# Patient Record
Sex: FEMALE | Race: WHITE | Employment: OTHER | URBAN - METROPOLITAN AREA
[De-identification: names, ages, dates, MRNs, and addresses within clinical notes are randomized per-mention and may not be internally consistent; named-entity substitution may affect disease eponyms.]

---

## 2017-01-16 DIAGNOSIS — F41.9 ANXIETY: Primary | ICD-10-CM

## 2017-01-16 RX ORDER — LORAZEPAM 0.5 MG/1
0.25-0.5 TABLET ORAL EVERY 8 HOURS PRN
Qty: 30 TABLET | Refills: 1 | Status: SHIPPED | OUTPATIENT
Start: 2017-01-16 | End: 2017-04-12

## 2017-01-16 NOTE — TELEPHONE ENCOUNTER
LORazepam (ATIVAN) 0.5 MG tablet      Last Written Prescription Date:  9/15/2016  Last Fill Quantity: 30 tablet,   # refills: 1  Last Office Visit with [facility], [facility] or [facility] prescribing provider: 10/12/2016  Future Office visit:       Routing refill request to provider for review/approval because:  Drug not on the [facility], [facility] or [facility] refill protocol or controlled substance

## 2017-01-28 ENCOUNTER — MYC MEDICAL ADVICE (OUTPATIENT)
Dept: FAMILY MEDICINE | Facility: CLINIC | Age: 62
End: 2017-01-28

## 2017-01-28 DIAGNOSIS — F41.9 ANXIETY: Primary | ICD-10-CM

## 2017-01-30 RX ORDER — VENLAFAXINE HYDROCHLORIDE 75 MG/1
75 CAPSULE, EXTENDED RELEASE ORAL DAILY
Qty: 60 CAPSULE | Refills: 0 | Status: SHIPPED | OUTPATIENT
Start: 2017-01-30 | End: 2017-01-30

## 2017-01-30 RX ORDER — VENLAFAXINE HYDROCHLORIDE 75 MG/1
75 CAPSULE, EXTENDED RELEASE ORAL DAILY
Qty: 90 CAPSULE | Refills: 0 | Status: SHIPPED | OUTPATIENT
Start: 2017-01-30 | End: 2017-04-12

## 2017-03-14 ENCOUNTER — MYC REFILL (OUTPATIENT)
Dept: FAMILY MEDICINE | Facility: CLINIC | Age: 62
End: 2017-03-14

## 2017-03-14 DIAGNOSIS — F41.9 ANXIETY: ICD-10-CM

## 2017-03-14 RX ORDER — VENLAFAXINE HYDROCHLORIDE 75 MG/1
75 CAPSULE, EXTENDED RELEASE ORAL DAILY
Qty: 90 CAPSULE | Refills: 0 | Status: CANCELLED | OUTPATIENT
Start: 2017-03-14

## 2017-03-14 NOTE — TELEPHONE ENCOUNTER
Please see alternate refill request from 3/14/17.  Luisa Sim, RN, BSN  [facility]

## 2017-03-14 NOTE — TELEPHONE ENCOUNTER
Message from [employer]:  Original authorizing provider: Karen Weiler, MD    Austin Shaffer would like a refill of the following medications:  venlafaxine (EFFEXOR-XR) 75 MG 24 hr capsule [Karen Weiler, MD]    Preferred pharmacy: [facility] [address] [place] [address] [address] [facility] [address] AT [place] [address] & [facility]    Comment:

## 2017-03-14 NOTE — TELEPHONE ENCOUNTER
Rx was refilled on 1/30/17 for 90 day supply. Message sent to patient advising to contact her pharmacy.  Luisa Sim RN, BSN  [facility]

## 2017-03-14 NOTE — TELEPHONE ENCOUNTER
Message from [employer]:  Original authorizing provider: Karen Weiler, MD    Austin Shaffer would like a refill of the following medications:  venlafaxine (EFFEXOR-XR) 75 MG 24 hr capsule [Karen Weiler, MD]    Preferred pharmacy: [facility] [address] [place] [address] [address] [facility] [address] AT [place] [address] & [facility]    Comment:

## 2017-04-12 ENCOUNTER — MYC REFILL (OUTPATIENT)
Dept: FAMILY MEDICINE | Facility: CLINIC | Age: 62
End: 2017-04-12

## 2017-04-12 DIAGNOSIS — F41.9 ANXIETY: ICD-10-CM

## 2017-04-13 RX ORDER — LORAZEPAM 0.5 MG/1
0.25-0.5 TABLET ORAL EVERY 8 HOURS PRN
Qty: 30 TABLET | Refills: 1 | Status: SHIPPED | OUTPATIENT
Start: 2017-04-13 | End: 2017-06-20

## 2017-04-13 RX ORDER — VENLAFAXINE HYDROCHLORIDE 75 MG/1
75 CAPSULE, EXTENDED RELEASE ORAL DAILY
Qty: 90 CAPSULE | Refills: 0 | Status: SHIPPED | OUTPATIENT
Start: 2017-04-13 | End: 2017-04-28

## 2017-04-13 NOTE — TELEPHONE ENCOUNTER
lorazepam      Last Written Prescription Date:  1/16/17  Last Fill Quantity: 30,   # refills: 1  Last Office Visit with [facility] or [facility] prescribing provider: 9/15/16  Future Office visit:       Routing refill request to provider for review/approval because:  Drug not on the [facility] or [facility] refill protocol or controlled substance    venlafaxine    Last Written Prescription Date: 1/30/17  Last Fill Quantity: 90, # refills: 0  Last Office Visit with [facility] or [facility] prescribing provider: 9/15/16     BP Readings from Last 3 Encounters:   09/15/16 126/72   10/08/15 118/78   06/19/15 114/82     Pulse: (for [facility])  Creatinine   Date Value Ref Range Status   09/15/2016 0.74 0.52 - 1.04 mg/dL Final   ]    Last PHQ-9 score on record=   PHQ-9 SCORE 9/15/2016   Total Score MyChart -   Total Score 8     Routing refill request to provider for review/approval because:  Labs out of range:  PHQ-9  Labs not current:  PHQ-9  Patient needs to be seen because:  Due for depression follow up  Luisa Sim, RN, BSN  [facility]

## 2017-04-13 NOTE — TELEPHONE ENCOUNTER
Message from MyChart:  Original authorizing provider: Karen Weiler, MD    Austin CAMACHOCirilo Edmund would like a refill of the following medications:  LORazepam (ATIVAN) 0.5 MG tablet [Karen Weiler, MD]  venlafaxine (EFFEXOR-XR) 75 MG 24 hr capsule [Karen Weiler, MD]    Preferred pharmacy: [facility] [address] [place] [address] [address] AT [place] [address] & [facility]    Comment:

## 2017-04-27 ENCOUNTER — TELEPHONE (OUTPATIENT)
Dept: FAMILY MEDICINE | Facility: CLINIC | Age: 62
End: 2017-04-27

## 2017-04-27 NOTE — TELEPHONE ENCOUNTER
Patient is aware of overdue screening and will call on own time for scheduling due to insurance change. Thanks

## 2017-04-28 ENCOUNTER — OFFICE VISIT (OUTPATIENT)
Dept: FAMILY MEDICINE | Facility: CLINIC | Age: 62
End: 2017-04-28
Payer: COMMERCIAL

## 2017-04-28 VITALS
SYSTOLIC BLOOD PRESSURE: 116 MMHG | DIASTOLIC BLOOD PRESSURE: 80 MMHG | HEART RATE: 70 BPM | OXYGEN SATURATION: 99 % | TEMPERATURE: 97.7 F | HEIGHT: 62 IN | BODY MASS INDEX: 30.88 KG/M2 | WEIGHT: 167.8 LBS

## 2017-04-28 DIAGNOSIS — F41.9 ANXIETY: Primary | ICD-10-CM

## 2017-04-28 DIAGNOSIS — G47.00 INSOMNIA, UNSPECIFIED TYPE: ICD-10-CM

## 2017-04-28 DIAGNOSIS — Z12.31 VISIT FOR SCREENING MAMMOGRAM: ICD-10-CM

## 2017-04-28 PROCEDURE — 99213 OFFICE O/P EST LOW 20 MIN: CPT | Performed by: PHYSICIAN ASSISTANT

## 2017-04-28 RX ORDER — VENLAFAXINE HYDROCHLORIDE 75 MG/1
75 CAPSULE, EXTENDED RELEASE ORAL DAILY
Qty: 90 CAPSULE | Refills: 3 | Status: SHIPPED | OUTPATIENT
Start: 2017-04-28 | End: 2017-09-28

## 2017-04-28 ASSESSMENT — ANXIETY QUESTIONNAIRES
GAD7 TOTAL SCORE: 6
6. BECOMING EASILY ANNOYED OR IRRITABLE: SEVERAL DAYS
3. WORRYING TOO MUCH ABOUT DIFFERENT THINGS: SEVERAL DAYS
5. BEING SO RESTLESS THAT IT IS HARD TO SIT STILL: SEVERAL DAYS
IF YOU CHECKED OFF ANY PROBLEMS ON THIS QUESTIONNAIRE, HOW DIFFICULT HAVE THESE PROBLEMS MADE IT FOR YOU TO DO YOUR WORK, TAKE CARE OF THINGS AT HOME, OR GET ALONG WITH OTHER PEOPLE: SOMEWHAT DIFFICULT
7. FEELING AFRAID AS IF SOMETHING AWFUL MIGHT HAPPEN: NOT AT ALL
1. FEELING NERVOUS, ANXIOUS, OR ON EDGE: SEVERAL DAYS
2. NOT BEING ABLE TO STOP OR CONTROL WORRYING: SEVERAL DAYS

## 2017-04-28 ASSESSMENT — PATIENT HEALTH QUESTIONNAIRE - PHQ9: 5. POOR APPETITE OR OVEREATING: SEVERAL DAYS

## 2017-04-28 NOTE — PATIENT INSTRUCTIONS
-Please call the clinic at [phone] when you are ready to schedule your mammogram. They can check the schedule for the mobile unit and assist you in scheduling your appointment

## 2017-04-28 NOTE — PROGRESS NOTES
SUBJECTIVE:                                                    Austin Shaffer is a 61 year old female who presents to clinic today for the following health issues:    Anxiety Follow-Up    Status since last visit: No change    Other associated symptoms:None    Complicating factors:   Significant life event: No   Current substance abuse: None  Depression symptoms: Yes-  occasionally   ELEAZAR-7 SCORE 1/6/2016 9/15/2016 9/15/2016   Total Score - - -   Total Score 8 (mild anxiety) - -   Total Score - 8 8        GAD7       Amount of exercise or physical activity: not since back from [place] , normally golfing     Problems taking medications regularly: No    Medication side effects: none    Diet: regular (no restrictions)    Back in [place] for about the past month.    Poor sleep. Hard time falling asleep. Stays asleep once she falls asleep. Takes Effexor in the evening.  Feels like her Effexor dose is good; doesn't want to make any changes    Lorazepam: couple of times per week, but some weeks doesn't have to take it at all    Asthma symptoms under good control. URIs are a trigger.  Some seasonal allergies at times, but they don't seem to trigger asthma symptoms.    Problem list and histories reviewed & adjusted, as indicated.  Additional history: as documented    Patient Active Problem List   Diagnosis     Moderate recurrent major depression (H)     Intermittent asthma     CARDIOVASCULAR SCREENING; LDL GOAL LESS THAN 160     Advanced directives, counseling/discussion     Anxiety     Vitamin D deficiency     Past Surgical History:   Procedure Laterality Date     ABDOMEN SURGERY      ? Enteritis as a child, possibly appendix     HYSTERECTOMY TOTAL ABDOMINAL, BILATERAL SALPINGO-OOPHORECTOMY, COMBINED  1998     TONSILLECTOMY         Social History   Substance Use Topics     Smoking status: Never Smoker     Smokeless tobacco: Never Used     Alcohol use 0.0 oz/week     0 Standard drinks or equivalent per week      Comment:  "occ     Family History   Problem Relation Age of Onset     Cardiovascular Mother      Hypertension Mother      Cardiovascular Maternal Grandmother      CANCER Father      Kidney     Hypertension Brother      DIABETES Brother          Current Outpatient Prescriptions   Medication Sig Dispense Refill     venlafaxine (EFFEXOR-XR) 75 MG 24 hr capsule Take 1 capsule (75 mg) by mouth daily 90 capsule 3     LORazepam (ATIVAN) 0.5 MG tablet Take 0.5-1 tablets (0.25-0.5 mg) by mouth every 8 hours as needed for anxiety 30 tablet 1     hydrOXYzine (ATARAX) 25 MG tablet 1 tab at bedtime 90 tablet 3     albuterol (ALBUTEROL) 108 (90 BASE) MCG/ACT inhaler Inhale 2 puffs into the lungs every 6 hours as needed for shortness of breath / dyspnea 3 Inhaler 1     [DISCONTINUED] venlafaxine (EFFEXOR-XR) 75 MG 24 hr capsule Take 1 capsule (75 mg) by mouth daily 90 capsule 0     [DISCONTINUED] venlafaxine (EFFEXOR-XR) 75 MG 24 hr capsule Take 1 capsule (75 mg) by mouth daily Please schedule appointment for further refills 90 capsule 3     betamethasone dipropionate (DIPROSONE) 0.05 % lotion Apply to scalp at bedtime when needed 60 mL 1     betamethasone dipropionate (DIPROSONE) 0.05 % cream Apply to trunk, arms and legs bid for 10-14 consecutive days, Not to be used on face or groin 45 g 1     ORDER FOR DME Equipment being ordered: walking boot. 1 Device 0     Allergies   Allergen Reactions     Sulfa Drugs      Morphine        Reviewed and updated as needed this visit by clinical staff       Reviewed and updated as needed this visit by Provider         ROS:  C: NEGATIVE for fever, chills, change in weight  PSYCHIATRIC: POSITIVE for anxiety, depressed mood and insomnia and NEGATIVE for thoughts of hurting someone else and thoughts of self harm    OBJECTIVE:                                                    /80  Pulse 70  Temp 97.7  F (36.5  C) (Oral)  Ht 5' 2\" (1.575 m)  Wt 167 lb 12.8 oz (76.1 kg)  SpO2 99%  BMI 30.69 " kg/m2  Body mass index is 30.69 kg/(m^2).  GENERAL: healthy, alert and no distress  RESP: lungs clear to auscultation - no rales, rhonchi or wheezes  CV: regular rate and rhythm, normal S1 S2, no S3 or S4, no murmur, click or rub, no peripheral edema and peripheral pulses strong  PSYCH: mentation appears normal, affect normal/bright    Diagnostic Test Results:  No results found for this or any previous visit (from the past 24 hour(s)).     ASSESSMENT/PLAN:                                                      1. Anxiety  Will continue on Effexor at current dose. Advised trying Effexor in the morning, in case it is contributing to her insomnia.  Insurance will only dispense 30 day supply, which is difficult when they go to [place] in the winter.  - venlafaxine (EFFEXOR-XR) 75 MG 24 hr capsule; Take 1 capsule (75 mg) by mouth daily  Dispense: 90 capsule; Refill: 3    2. Insomnia, unspecified type  Switch Effexor to AM to see if this helps. If no improvement, consider other options.    3. Visit for screening mammogram  Patient will schedule mammogram.  - MA SCREENING DIGITAL BILAT - Future  (s+30); Future    See Patient Instructions  Follow-up for annual exam in September.    Bettie Almanzar PA-C  [facility]

## 2017-04-28 NOTE — NURSING NOTE
"Chief Complaint   Patient presents with     Anxiety       Initial /80  Pulse 70  Temp 97.7  F (36.5  C) (Oral)  Ht 5' 2\" (1.575 m)  Wt 167 lb 12.8 oz (76.1 kg)  SpO2 99%  BMI 30.69 kg/m2 Estimated body mass index is 30.69 kg/(m^2) as calculated from the following:    Height as of this encounter: 5' 2\" (1.575 m).    Weight as of this encounter: 167 lb 12.8 oz (76.1 kg).  Medication Reconciliation: complete   Miladis Johnson Medical Assistant      "

## 2017-04-28 NOTE — MR AVS SNAPSHOT
After Visit Summary   4/28/2017    Austin Shaffer    MRN: [id number]           Patient Information     Date Of Birth          1955        Visit Information        Provider Department      4/28/2017 8:20 AM Bettie Almanzar PA-C [facility]        Today's Diagnoses     Visit for screening mammogram    -  1    Anxiety          Care Instructions    -Please call the clinic at [phone] when you are ready to schedule your mammogram. They can check the schedule for the mobile unit and assist you in scheduling your appointment        Follow-ups after your visit        Future tests that were ordered for you today     Open Future Orders        Priority Expected Expires Ordered    MA SCREENING DIGITAL BILAT - Future  (s+30) Routine  4/28/2018 4/28/2017            Who to contact     If you have questions or need follow up information about today's clinic visit or your schedule please contact [facility] [place] directly at [phone].  Normal or non-critical lab and imaging results will be communicated to you by MyChart, letter or phone within 4 business days after the clinic has received the results. If you do not hear from us within 7 days, please contact the clinic through [employer] or phone. If you have a critical or abnormal lab result, we will notify you by phone as soon as possible.  Submit refill requests through [employer] or call your pharmacy and they will forward the refill request to us. Please allow 3 business days for your refill to be completed.          Additional Information About Your Visit        MyChart Information     [employer] gives you secure access to your electronic health record. If you see a primary care provider, you can also send messages to your care team and make appointments. If you have questions, please call your primary care clinic.  If you do not have a primary care provider, please call [phone] and they will assist you.        Care EveryWhere ID   "   This is your Care EveryWhere ID. This could be used by other organizations to access your [place] medical records  [id number]        Your Vitals Were     Pulse Temperature Height Pulse Oximetry BMI (Body Mass Index)       70 97.7  F (36.5  C) (Oral) 5' 2\" (1.575 m) 99% 30.69 kg/m2        Blood Pressure from Last 3 Encounters:   04/28/17 116/80   09/15/16 126/72   10/08/15 118/78    Weight from Last 3 Encounters:   04/28/17 167 lb 12.8 oz (76.1 kg)   09/15/16 158 lb (71.7 kg)   10/08/15 170 lb (77.1 kg)                 Where to get your medicines      These medications were sent to [id number] - [place], [place] - [address] [facility] [address] AT [place] [address] & [facility]  [address] [place] [address] [address], [place] [address]    Hours:  24-hours Phone:  [phone]     venlafaxine 75 MG 24 hr capsule          Primary Care Provider Office Phone # Fax #    Karen Weiler, [phone]       [facility] [address]  [place] [address]        Thank you!     Thank you for choosing [facility]  for your care. Our goal is always to provide you with excellent care. Hearing back from our patients is one way we can continue to improve our services. Please take a few minutes to complete the written survey that you may receive in the mail after your visit with us. Thank you!             Your Updated Medication List - Protect others around you: Learn how to safely use, store and throw away your medicines at www.disposemymeds.org.          This list is accurate as of: 4/28/17  8:52 AM.  Always use your most recent med list.                   Brand Name Dispense Instructions for use    albuterol 108 (90 BASE) MCG/ACT Inhaler    albuterol    3 Inhaler    Inhale 2 puffs into the lungs every 6 hours as needed for shortness of breath / dyspnea       * betamethasone dipropionate 0.05 % cream    DIPROSONE    45 g    Apply to trunk, arms and legs bid for 10-14 consecutive days, Not to be used on " face or groin       * betamethasone dipropionate 0.05 % lotion    DIPROSONE    60 mL    Apply to scalp at bedtime when needed       hydrOXYzine 25 MG tablet    ATARAX    90 tablet    1 tab at bedtime       LORazepam 0.5 MG tablet    ATIVAN    30 tablet    Take 0.5-1 tablets (0.25-0.5 mg) by mouth every 8 hours as needed for anxiety       order for DME     1 Device    Equipment being ordered: walking boot.       venlafaxine 75 MG 24 hr capsule    EFFEXOR-XR    90 capsule    Take 1 capsule (75 mg) by mouth daily       * Notice:  This list has 2 medication(s) that are the same as other medications prescribed for you. Read the directions carefully, and ask your doctor or other care provider to review them with you.

## 2017-04-29 ASSESSMENT — PATIENT HEALTH QUESTIONNAIRE - PHQ9: SUM OF ALL RESPONSES TO PHQ QUESTIONS 1-9: 12

## 2017-04-29 ASSESSMENT — ASTHMA QUESTIONNAIRES: ACT_TOTALSCORE: 25

## 2017-04-29 ASSESSMENT — ANXIETY QUESTIONNAIRES: GAD7 TOTAL SCORE: 6

## 2017-06-20 ENCOUNTER — MYC REFILL (OUTPATIENT)
Dept: FAMILY MEDICINE | Facility: CLINIC | Age: 62
End: 2017-06-20

## 2017-06-20 DIAGNOSIS — F41.9 ANXIETY: ICD-10-CM

## 2017-06-20 RX ORDER — LORAZEPAM 0.5 MG/1
0.25-0.5 TABLET ORAL EVERY 8 HOURS PRN
Qty: 30 TABLET | Refills: 1 | Status: SHIPPED | OUTPATIENT
Start: 2017-06-20 | End: 2017-09-18

## 2017-06-20 NOTE — TELEPHONE ENCOUNTER
Message from [employer]:  Original authorizing provider: Karen Weiler, MD    Austin Shaffer would like a refill of the following medications:  LORazepam (ATIVAN) 0.5 MG tablet [Karen Weiler, MD]    Preferred pharmacy: [facility] [address] [place] [address] [address] [place] [address] & [place]    Comment:      Medication renewals requested in this message routed to other providers:  venlafaxine (EFFEXOR-XR) 75 MG 24 hr capsule [Bettie Almanzar PA-C]

## 2017-06-20 NOTE — TELEPHONE ENCOUNTER
Lorazepam      Last Written Prescription Date:  4/13/2017  Last Fill Quantity: 30,   # refills: 1  Last Office Visit with [facility], [facility] or  Health prescribing provider: 4/28/2017  Future Office visit:       Routing refill request to provider for review/approval because:  Drug not on the [facility], [facility] or [facility] refill protocol or controlled substance

## 2017-09-18 DIAGNOSIS — F41.9 ANXIETY: ICD-10-CM

## 2017-09-18 NOTE — TELEPHONE ENCOUNTER
lorazepam      Last Written Prescription Date:  6/20/2017  Last Fill Quantity: 30,   # refills: 1  Last Office Visit with [facility], [facility] or  Health prescribing provider: 4/28/2017  Future Office visit:       Routing refill request to provider for review/approval because:  Drug not on the [facility], [facility] or [employer] refill protocol or controlled substance

## 2017-09-20 RX ORDER — LORAZEPAM 0.5 MG/1
0.25-0.5 TABLET ORAL EVERY 8 HOURS PRN
Qty: 30 TABLET | Refills: 1 | Status: SHIPPED | OUTPATIENT
Start: 2017-09-20 | End: 2017-11-09

## 2017-09-28 ENCOUNTER — OFFICE VISIT (OUTPATIENT)
Dept: FAMILY MEDICINE | Facility: CLINIC | Age: 62
End: 2017-09-28
Payer: COMMERCIAL

## 2017-09-28 VITALS
WEIGHT: 168 LBS | TEMPERATURE: 98 F | OXYGEN SATURATION: 97 % | HEART RATE: 75 BPM | SYSTOLIC BLOOD PRESSURE: 120 MMHG | HEIGHT: 62 IN | BODY MASS INDEX: 30.91 KG/M2 | DIASTOLIC BLOOD PRESSURE: 78 MMHG

## 2017-09-28 DIAGNOSIS — Z00.00 ROUTINE GENERAL MEDICAL EXAMINATION AT A HEALTH CARE FACILITY: Primary | ICD-10-CM

## 2017-09-28 DIAGNOSIS — L30.9 ECZEMA, UNSPECIFIED TYPE: ICD-10-CM

## 2017-09-28 DIAGNOSIS — Z12.31 VISIT FOR SCREENING MAMMOGRAM: ICD-10-CM

## 2017-09-28 DIAGNOSIS — L28.1 PRURIGO NODULARIS: ICD-10-CM

## 2017-09-28 DIAGNOSIS — Z23 NEED FOR PROPHYLACTIC VACCINATION AND INOCULATION AGAINST INFLUENZA: ICD-10-CM

## 2017-09-28 DIAGNOSIS — F41.9 ANXIETY: ICD-10-CM

## 2017-09-28 LAB
ALBUMIN SERPL-MCNC: 3.9 G/DL (ref 3.4–5)
ALP SERPL-CCNC: 86 U/L (ref 40–150)
ALT SERPL W P-5'-P-CCNC: 26 U/L (ref 0–50)
ANION GAP SERPL CALCULATED.3IONS-SCNC: 8 MMOL/L (ref 3–14)
AST SERPL W P-5'-P-CCNC: 20 U/L (ref 0–45)
BILIRUB SERPL-MCNC: 0.9 MG/DL (ref 0.2–1.3)
BUN SERPL-MCNC: 13 MG/DL (ref 7–30)
CALCIUM SERPL-MCNC: 10 MG/DL (ref 8.5–10.1)
CHLORIDE SERPL-SCNC: 105 MMOL/L (ref 94–109)
CHOLEST SERPL-MCNC: 237 MG/DL
CO2 SERPL-SCNC: 26 MMOL/L (ref 20–32)
CREAT SERPL-MCNC: 0.76 MG/DL (ref 0.52–1.04)
ERYTHROCYTE [DISTWIDTH] IN BLOOD BY AUTOMATED COUNT: 12.6 % (ref 10–15)
GFR SERPL CREATININE-BSD FRML MDRD: 77 ML/MIN/1.7M2
GLUCOSE SERPL-MCNC: 99 MG/DL (ref 70–99)
HCT VFR BLD AUTO: 42.5 % (ref 35–47)
HDLC SERPL-MCNC: 74 MG/DL
HGB BLD-MCNC: 14.1 G/DL (ref 11.7–15.7)
LDLC SERPL CALC-MCNC: 151 MG/DL
MCH RBC QN AUTO: 30.1 PG (ref 26.5–33)
MCHC RBC AUTO-ENTMCNC: 33.2 G/DL (ref 31.5–36.5)
MCV RBC AUTO: 91 FL (ref 78–100)
NONHDLC SERPL-MCNC: 163 MG/DL
PLATELET # BLD AUTO: 279 10E9/L (ref 150–450)
POTASSIUM SERPL-SCNC: 5 MMOL/L (ref 3.4–5.3)
PROT SERPL-MCNC: 7.5 G/DL (ref 6.8–8.8)
RBC # BLD AUTO: 4.68 10E12/L (ref 3.8–5.2)
SODIUM SERPL-SCNC: 139 MMOL/L (ref 133–144)
TRIGL SERPL-MCNC: 59 MG/DL
TSH SERPL DL<=0.005 MIU/L-ACNC: 1.21 MU/L (ref 0.4–4)
WBC # BLD AUTO: 6.6 10E9/L (ref 4–11)

## 2017-09-28 PROCEDURE — 36415 COLL VENOUS BLD VENIPUNCTURE: CPT | Performed by: FAMILY MEDICINE

## 2017-09-28 PROCEDURE — 84443 ASSAY THYROID STIM HORMONE: CPT | Performed by: FAMILY MEDICINE

## 2017-09-28 PROCEDURE — 90686 IIV4 VACC NO PRSV 0.5 ML IM: CPT | Performed by: FAMILY MEDICINE

## 2017-09-28 PROCEDURE — 99396 PREV VISIT EST AGE 40-64: CPT | Mod: 25 | Performed by: FAMILY MEDICINE

## 2017-09-28 PROCEDURE — 80061 LIPID PANEL: CPT | Performed by: FAMILY MEDICINE

## 2017-09-28 PROCEDURE — 85027 COMPLETE CBC AUTOMATED: CPT | Performed by: FAMILY MEDICINE

## 2017-09-28 PROCEDURE — 90471 IMMUNIZATION ADMIN: CPT | Performed by: FAMILY MEDICINE

## 2017-09-28 PROCEDURE — 80053 COMPREHEN METABOLIC PANEL: CPT | Performed by: FAMILY MEDICINE

## 2017-09-28 RX ORDER — HYDROXYZINE HYDROCHLORIDE 25 MG/1
TABLET, FILM COATED ORAL
Qty: 90 TABLET | Refills: 3 | Status: SHIPPED | OUTPATIENT
Start: 2017-09-28 | End: 2018-10-01

## 2017-09-28 RX ORDER — VENLAFAXINE HYDROCHLORIDE 75 MG/1
75 CAPSULE, EXTENDED RELEASE ORAL DAILY
Qty: 90 CAPSULE | Refills: 3 | Status: SHIPPED | OUTPATIENT
Start: 2017-09-28 | End: 2018-10-01

## 2017-09-28 ASSESSMENT — ANXIETY QUESTIONNAIRES
GAD7 TOTAL SCORE: 10
1. FEELING NERVOUS, ANXIOUS, OR ON EDGE: MORE THAN HALF THE DAYS
6. BECOMING EASILY ANNOYED OR IRRITABLE: MORE THAN HALF THE DAYS
IF YOU CHECKED OFF ANY PROBLEMS ON THIS QUESTIONNAIRE, HOW DIFFICULT HAVE THESE PROBLEMS MADE IT FOR YOU TO DO YOUR WORK, TAKE CARE OF THINGS AT HOME, OR GET ALONG WITH OTHER PEOPLE: SOMEWHAT DIFFICULT
1. FEELING NERVOUS, ANXIOUS, OR ON EDGE: MORE THAN HALF THE DAYS
3. WORRYING TOO MUCH ABOUT DIFFERENT THINGS: MORE THAN HALF THE DAYS
5. BEING SO RESTLESS THAT IT IS HARD TO SIT STILL: SEVERAL DAYS
6. BECOMING EASILY ANNOYED OR IRRITABLE: MORE THAN HALF THE DAYS
GAD7 TOTAL SCORE: 10
5. BEING SO RESTLESS THAT IT IS HARD TO SIT STILL: SEVERAL DAYS
7. FEELING AFRAID AS IF SOMETHING AWFUL MIGHT HAPPEN: SEVERAL DAYS
IF YOU CHECKED OFF ANY PROBLEMS ON THIS QUESTIONNAIRE, HOW DIFFICULT HAVE THESE PROBLEMS MADE IT FOR YOU TO DO YOUR WORK, TAKE CARE OF THINGS AT HOME, OR GET ALONG WITH OTHER PEOPLE: SOMEWHAT DIFFICULT
7. FEELING AFRAID AS IF SOMETHING AWFUL MIGHT HAPPEN: SEVERAL DAYS
2. NOT BEING ABLE TO STOP OR CONTROL WORRYING: SEVERAL DAYS
3. WORRYING TOO MUCH ABOUT DIFFERENT THINGS: MORE THAN HALF THE DAYS
2. NOT BEING ABLE TO STOP OR CONTROL WORRYING: SEVERAL DAYS

## 2017-09-28 ASSESSMENT — PATIENT HEALTH QUESTIONNAIRE - PHQ9
5. POOR APPETITE OR OVEREATING: SEVERAL DAYS
5. POOR APPETITE OR OVEREATING: SEVERAL DAYS
SUM OF ALL RESPONSES TO PHQ QUESTIONS 1-9: 14

## 2017-09-28 NOTE — PATIENT INSTRUCTIONS
Golfer's Elbow (Medial Epicondylitis)                What is golfer's elbow?   Golfer's elbow (medial epicondylitis) is a painful inflammation of the bony bump on the inner side of the elbow.   The elbow joint is made up of the bone in the upper arm (humerus) and one of the bones in the lower arm (ulna). The bony bumps at the bottom of the humerus are called the epicondyles. The bump on the side closest to the body is called the medial epicondyle.   The tendons of the muscles that work to bend your wrist attach at the medial epicondyle. Medial epicondylitis is also referred to as wrist flexor tendinopathy or elbow tendinopathy.   How does it occur?   Golfer's elbow is caused by overuse of the muscles that bend your fingers and wrist. When these muscles are overused, the tendons are repeatedly pulled where they attach to the bone. As a result, the tendons get inflamed. This commonly happens in sports such as golf, in throwing sports, and in racquet sports. It may also happen in work activities like carpentry or typing.   If you have had tendinopathy for a long time, scar tissue may develop in the tendon. This is called tendinosis.   What are the symptoms?   Golfer's elbow causes pain on the bony bump on the inner side of the elbow. You may also have pain along the entire inner side of the forearm when the wrist is bent. You may have pain when you make a fist.   How is it diagnosed?   Your healthcare provider will examine your elbow and check for tenderness.   How is it treated?   To treat this condition:   Put an ice pack, gel pack, or package of frozen vegetables, wrapped in a cloth on the area every 3 to 4 hours, for up to 20 minutes at a time.   You could also do ice massage. To do this, first freeze water in a Styrofoam cup, then peel the top of the cup away to expose the ice. Hold the bottom of the cup and rub the ice over your elbow for 5 to 10 minutes. Do this 3 to 5 times a day for the  first 2 days.   Raise the elbow on a pillow when you are sitting or lying down.   Use an elastic bandage around the elbow, or a tennis elbow strap just below the tender spot on the elbow as directed by your provider.   Take an anti-inflammatory such as ibuprofen, or other medicine as directed by your provider. Nonsteroidal anti-inflammatory medicines (NSAIDs) may cause stomach bleeding and other problems. These risks increase with age. Read the label and take as directed. Unless recommended by your healthcare provider, do not take for more than 10 days.   While you recover from your injury, you will need to change your sport or activity to one that does not make your condition worse. For example, walk instead of playing golf, or write things out by hand instead of typing.   Follow your provider's instructions for doing exercises to help you recover.   In severe cases, you may need surgery.   How long will the effects last?   The length of recovery depends on many factors such as your age, health, and if you have had a previous injury. Recovery time also depends on the severity of the injury. A mild injury may recover within a few weeks, whereas a severe injury may take 6 weeks or longer to recover. This problem can sometimes be long-lasting and can even come back once you are better. You need to stop doing the activities that cause pain until the elbow has healed. If you keep doing activities that cause pain, your symptoms will return and it will take longer to recover.   When can I return to my normal activities?   Everyone recovers from an injury at a different rate. Return to your activities depends on how soon your elbow recovers, not by how many days or weeks it has been since your injury has occurred. In general, the longer you have symptoms before you start treatment, the longer it will take to get better. The goal is to return to your normal activities as soon as is safely possible. If you return too soon  you may worsen your injury.   You may return when you are able to forcefully  a bat or golf club, or do activities such as working at a keyboard without pain in your elbow. It is important that there is no swelling around your injured elbow and that it is as strong as the uninjured elbow. You must have full range of motion of your elbow.   How can it be prevented?   Golfer's elbow occurs because you overuse the muscles that bend your wrist. Slow down activities that cause pain. Wearing a tennis elbow strap and doing elbow stretching exercises may help prevent this problem.          Golfer's Elbow (Medial Epicondylitis) Rehabilitation Exercises              You may do the stretching exercises right away. You may do the strengthening exercises when stretching is nearly painless.   Stretching exercises   Wrist active range of motion: Flexion and extension: Bend the wrist of your injured arm forward and back as far as you can. Do 3 sets of 10.   Wrist stretch: Press the back of the hand on your injured side with your other hand to help bend your wrist. Hold for 15 to 30 seconds. Next, stretch the hand back by pressing the fingers in a backward direction. Hold for 15 to 30 seconds. Keep the arm on your injured side straight during this exercise. Do 3 sets.   Forearm pronation and supination: Bend the elbow of your injured arm 90 degrees, keeping your elbow at your side. Turn your palm up and hold for 5 seconds. Then slowly turn your palm down and hold for 5 seconds. Make sure you keep your elbow at your side and bent 90 degrees while you do the exercise. Do 3 sets of 10.   Strengthening exercises   Eccentric wrist flexion: Hold a can or hammer handle in the hand of your injured side with your palm up. Use the hand on the side that is not injured to bend your wrist up. Then let go of your wrist and use just your injured side to lower the weight slowly back to the starting position. Do 3 sets of 15. Gradually increase  the weight you are holding.   Eccentric wrist extension: Hold a soup can or hammer handle in the hand of your injured side with your palm facing down. Use the hand on the side that is not injured to bend your wrist up. Then let go of your wrist and use just your injured side to lower the weight slowly back to the starting position. Do 3 sets of 15. Gradually increase the weight you are holding.    strengthening: Squeeze a soft rubber ball and hold the squeeze for 5 seconds. Do 3 sets of 10.   Forearm pronation and supination strengthening: Hold a soup can or hammer handle in your hand and bend your elbow 90 degrees. Slowly turn your hand so your palm is up and then down. Do 3 sets of 10.   Resisted elbow flexion and extension: Hold a can of soup with your palm up. Slowly bend your elbow so that your hand is coming toward your shoulder. Then lower it slowly so your arm is completely straight. Do 3 sets of 10. Slowly increase the weight you are using.                Published by [employer].  This content is reviewed periodically and is subject to change as new health information becomes available. The information is intended to inform and educate and is not a replacement for medical evaluation, advice, diagnosis or treatment by a healthcare professional.   Written by Nora Javier, MS, PT, and Krissy Gleason PT, [facility], for [employer].   ? 2010 [employer] and/or its affiliates. All Rights Reserved.   Copyright   [employer] 2011

## 2017-09-28 NOTE — LETTER
My Depression Action Plan  Name: Austin Shaffer   Date of Birth 1955  Date: 9/28/2017    My doctor: Weiler, Karen   My clinic: [facility] [place]  [address]  [facility] [address] [phone]          GREEN    ZONE   Good Control    What it looks like:     Things are going generally well. You have normal up s and down s. You may even feel depressed from time to time, but bad moods usually last less than a day.   What you need to do:  1. Continue to care for yourself (see self care plan)  2. Check your depression survival kit and update it as needed  3. Follow your physician s recommendations including any medication.  4. Do not stop taking medication unless you consult with your physician first.           YELLOW         ZONE Getting Worse    What it looks like:     Depression is starting to interfere with your life.     It may be hard to get out of bed; you may be starting to isolate yourself from others.    Symptoms of depression are starting to last most all day and this has happened for several days.     You may have suicidal thoughts but they are not constant.   What you need to do:     1. Call your care team, your response to treatment will improve if you keep your care team informed of your progress. Yellow periods are signs an adjustment may need to be made.     2. Continue your self-care, even if you have to fake it!    3. Talk to someone in your support network    4. Open up your depression survival kit           RED    ZONE Medical Alert - Get Help    What it looks like:     Depression is seriously interfering with your life.     You may experience these or other symptoms: You can t get out of bed most days, can t work or engage in other necessary activities, you have trouble taking care of basic hygiene, or basic responsibilities, thoughts of suicide or death that will not go away, self-injurious behavior.     What you need to do:  1. Call your care team and request a  same-day appointment. If they are not available (weekends or after hours) call your local crisis line, emergency room or 911.      Electronically signed by: Miladis Johnson, September 28, 2017    Depression Self Care Plan / Survival Kit    Self-Care for Depression  Here s the deal. Your body and mind are really not as separate as most people think.  What you do and think affects how you feel and how you feel influences what you do and think. This means if you do things that people who feel good do, it will help you feel better.  Sometimes this is all it takes.  There is also a place for medication and therapy depending on how severe your depression is, so be sure to consult with your medical provider and/ or Behavioral Health Consultant if your symptoms are worsening or not improving.     In order to better manage my stress, I will:    Exercise  Get some form of exercise, every day. This will help reduce pain and release endorphins, the  feel good  chemicals in your brain. This is almost as good as taking antidepressants!  This is not the same as joining a gym and then never going! (they count on that by the way ) It can be as simple as just going for a walk or doing some gardening, anything that will get you moving.      Hygiene   Maintain good hygiene (Get out of bed in the morning, Make your bed, Brush your teeth, Take a shower, and Get dressed like you were going to work, even if you are unemployed).  If your clothes don't fit try to get ones that do.    Diet  I will strive to eat foods that are good for me, drink plenty of water, and avoid excessive sugar, caffeine, alcohol, and other mood-altering substances.  Some foods that are helpful in depression are: complex carbohydrates, B vitamins, flaxseed, fish or fish oil, fresh fruits and vegetables.    Psychotherapy  I agree to participate in Individual Therapy (if recommended).    Medication  If prescribed medications, I agree to take them.  Missing doses can result  in serious side effects.  I understand that drinking alcohol, or other illicit drug use, may cause potential side effects.  I will not stop my medication abruptly without first discussing it with my provider.    Staying Connected With Others  I will stay in touch with my friends, family members, and my primary care provider/team.    Use your imagination  Be creative.  We all have a creative side; it doesn t matter if it s oil painting, sand castles, or mud pies! This will also kick up the endorphins.    Witness Beauty  (AKA stop and smell the roses) Take a look outside, even in mid-winter. Notice colors, textures. Watch the squirrels and birds.     Service to others  Be of service to others.  There is always someone else in need.  By helping others we can  get out of ourselves  and remember the really important things.  This also provides opportunities for practicing all the other parts of the program.    Humor  Laugh and be silly!  Adjust your TV habits for less news and crime-drama and more comedy.    Control your stress  Try breathing deep, massage therapy, biofeedback, and meditation. Find time to relax each day.     My support system    Clinic Contact:  Phone number:    Contact 1:  Phone number:    Contact 2:  Phone number:    Quaker/:  Phone number:    Therapist:  Phone number:    Local crisis center:    Phone number:    Other community support:  Phone number:

## 2017-09-28 NOTE — LETTER
My Asthma Action Plan  Name: Austin Shaffer   YOB: 1955  Date: 9/28/2017   My doctor: Karen Weiler, MD   My clinic: [facility]        My Control Medicine: None  My Rescue Medicine: Albuterol (Proair/Ventolin/Proventil) inhaler 2 puffs every 6 hours as needed    My Asthma Severity:   Avoid your asthma triggers:                GREEN ZONE   Good Control    I feel good    No cough or wheeze    Can work, sleep and play without asthma symptoms       Take your asthma control medicine every day.     1. If exercise triggers your asthma, take your rescue medication    15 minutes before exercise or sports, and    During exercise if you have asthma symptoms  2. Spacer to use with inhaler: If you have a spacer, make sure to use it with your inhaler             YELLOW ZONE Getting Worse  I have ANY of these:    I do not feel good    Cough or wheeze    Chest feels tight    Wake up at night   1. Keep taking your Green Zone medications  2. Start taking your rescue medicine:    every 20 minutes for up to 1 hour. Then every 4 hours for 24-48 hours.  3. If you stay in the Yellow Zone for more than 12-24 hours, contact your doctor.  4. If you do not return to the Green Zone in 12-24 hours or you get worse, start taking your oral steroid medicine if prescribed by your provider.           RED ZONE Medical Alert - Get Help  I have ANY of these:    I feel awful    Medicine is not helping    Breathing getting harder    Trouble walking or talking    Nose opens wide to breathe       1. Take your rescue medicine NOW  2. If your provider has prescribed an oral steroid medicine, start taking it NOW  3. Call your doctor NOW  4. If you are still in the Red Zone after 20 minutes and you have not reached your doctor:    Take your rescue medicine again and    Call 911 or go to the emergency room right away    See your regular doctor within 2 weeks of an Emergency Room or Urgent Care visit for follow-up treatment.         Electronically signed by: Miladis Johnson, September 28, 2017    Annual Reminders:  Meet with Asthma Educator,  Flu Shot in the Fall, consider Pneumonia Vaccination for patients with asthma (aged 19 and older).    Pharmacy:    [id number] - [place], [place] - [address] [facility] [address] [id number] - [place], [place] - [address] AT [facility] [address] [facility] & [address]                    Asthma Triggers  How To Control Things That Make Your Asthma Worse    Triggers are things that make your asthma worse.  Look at the list below to help you find your triggers and what you can do about them.  You can help prevent asthma flare-ups by staying away from your triggers.      Trigger                                                          What you can do   Cigarette Smoke  Tobacco smoke can make asthma worse. Do not allow smoking in your home, car or around you.  Be sure no one smokes at a child s day care or school.  If you smoke, ask your health care provider for ways to help you quit.  Ask family members to quit too.  Ask your health care provider for a referral to Quit Plan to help you quit smoking, or call 0-172-070-PLAN.     Colds, Flu, Bronchitis  These are common triggers of asthma. Wash your hands often.  Don t touch your eyes, nose or mouth.  Get a flu shot every year.     Dust Mites  These are tiny bugs that live in cloth or carpet. They are too small to see. Wash sheets and blankets in hot water every week.   Encase pillows and mattress in dust mite proof covers.  Avoid having carpet if you can. If you have carpet, vacuum weekly.   Use a dust mask and HEPA vacuum.   Pollen and Outdoor Mold  Some people are allergic to trees, grass, or weed pollen, or molds. Try to keep your windows closed.  Limit time out doors when pollen count is high.   Ask you health care provider about taking medicine during allergy season.     Animal Dander  Some people are allergic to skin  flakes, urine or saliva from pets with fur or feathers. Keep pets with fur or feathers out of your home.    If you can t keep the pet outdoors, then keep the pet out of your bedroom.  Keep the bedroom door closed.  Keep pets off cloth furniture and away from stuffed toys.     Mice, Rats, and Cockroaches  Some people are allergic to the waste from these pests.   Cover food and garbage.  Clean up spills and food crumbs.  Store grease in the refrigerator.   Keep food out of the bedroom.   Indoor Mold  This can be a trigger if your home has high moisture. Fix leaking faucets, pipes, or other sources of water.   Clean moldy surfaces.  Dehumidify basement if it is damp and smelly.   Smoke, Strong Odors, and Sprays  These can reduce air quality. Stay away from strong odors and sprays, such as perfume, powder, hair spray, paints, smoke incense, paint, cleaning products, candles and new carpet.   Exercise or Sports  Some people with asthma have this trigger. Be active!  Ask your doctor about taking medicine before sports or exercise to prevent symptoms.    Warm up for 5-10 minutes before and after sports or exercise.     Other Triggers of Asthma  Cold air:  Cover your nose and mouth with a scarf.  Sometimes laughing or crying can be a trigger.  Some medicines and food can trigger asthma.

## 2017-09-28 NOTE — PROGRESS NOTES
SUBJECTIVE:   CC: Austin Shaffer is an 61 year old woman who presents for preventive health visit.     Physical   Annual:     Getting at least 3 servings of Calcium per day::  Yes    Bi-annual eye exam::  Yes    Dental care twice a year::  Yes    Sleep apnea or symptoms of sleep apnea::  None    Diet::  Low fat/cholesterol    Frequency of exercise::  2-3 days/week    Duration of exercise::  15-30 minutes    Taking medications regularly::  Yes    Medication side effects::  Muscle aches    Additional concerns today::  YES      Medication refill- Pt is leaving for [place] at the end of October, needs refills on her antidepressants, and Hydroxyzine. Her depression is manageable, but feels her anxiety has worsened, feels she is doing well with her Effexor, occasionally takes 2 tablets instead of 1 tablet. She attributes her increased anxiety to health concerns with some of her family members, her younger brother had a stroke 3 weeks ago. No SI/HI. Does not want to see a therapist at this time.     Right elbow and wrist pain- Pt has had right elbow and right wrist pain for the past 1 week, has tried wrapping and using OTC pain ointment with mild relief. She has been occasionally playing golf.    No chest pain, or SOB. Pt is fasting today. Pt due for mammogram, wants one ordered. She had hysterectomy, Pap no longer needed. She had colonoscopy 2 years ago, was normal. No family hx of ovarian cancer.      Today's PHQ-2 Score: Answers for HPI/ROS submitted by the patient on 9/25/2017   PHQ-2 Score: 2    PHQ-2 ( 1999 Pfizer) 9/28/2017   Q1: Little interest or pleasure in doing things 1   Q2: Feeling down, depressed or hopeless 1   PHQ-2 Score 2   Q1: Little interest or pleasure in doing things -   Q2: Feeling down, depressed or hopeless -   PHQ-2 Score -     Abuse: Current or Past(Physical, Sexual or Emotional)- No  Do you feel safe in your environment - Yes    Social History   Substance Use Topics     Smoking  "status: Never Smoker     Smokeless tobacco: Never Used     Alcohol use 0.0 oz/week     0 Standard drinks or equivalent per week      Comment: occ     The patient does not drink >3 drinks per day nor >7 drinks per week.    Reviewed orders with patient.  Reviewed health maintenance and updated orders accordingly - Yes    Patient over age 50, mutual decision to screen reflected in health maintenance.    Pertinent mammograms are reviewed under the imaging tab.  History of abnormal Pap smear: Status post benign hysterectomy. Health Maintenance and Surgical History updated.    Reviewed and updated as needed this visit by clinical staff  Tobacco  Allergies  Meds  Med Hx  Surg Hx  Fam Hx  Soc Hx      Reviewed and updated as needed this visit by Provider        ROS:  C: NEGATIVE for fever, chills, change in weight  I: NEGATIVE for worrisome rashes, moles or lesions  E: NEGATIVE for vision changes or irritation  ENT: NEGATIVE for ear, mouth and throat problems  R: NEGATIVE for significant cough or SOB  B: NEGATIVE for masses, tenderness or discharge  CV: NEGATIVE for chest pain, palpitations or peripheral edema  GI: NEGATIVE for nausea, abdominal pain, heartburn, or change in bowel habits  : NEGATIVE for unusual urinary or vaginal symptoms. No vaginal bleeding.  M: Positive for right elbow and right wrist pain.  N: NEGATIVE for weakness, dizziness or paresthesias  P: NEGATIVE for changes in mood or affect     This document serves as a record of the services and decisions personally performed and made by Karen Weiler, MD. It was created on her behalf by Dennis Almanzar, a trained medical scribe. The creation of this document is based on the provider's statements to the medical scribe.  Dennis Almanzar 2:34 PM September 28, 2017     OBJECTIVE:   /78  Pulse 75  Temp 98  F (36.7  C) (Oral)  Ht 1.575 m (5' 2\")  Wt 76.2 kg (168 lb)  SpO2 97%  BMI 30.73 kg/m2  EXAM:  GENERAL APPEARANCE: healthy, alert and no " distress  EYES: Eyes grossly normal to inspection, PERRL and conjunctivae and sclerae normal  HENT: ear canals and TM's normal, nose and mouth without ulcers or lesions, oropharynx clear and oral mucous membranes moist  NECK: no adenopathy, no asymmetry, masses, or scars and thyroid normal to palpation  RESP: lungs clear to auscultation - no rales, rhonchi or wheezes  BREAST: normal without masses, tenderness or nipple discharge and no palpable axillary masses or adenopathy  CV: regular rate and rhythm, normal S1 S2, no S3 or S4, no murmur, click or rub, no peripheral edema and peripheral pulses strong  ABDOMEN: soft, nontender, no hepatosplenomegaly, no masses and bowel sounds normal  MS: no musculoskeletal defects are noted and gait is age appropriate without ataxia  Rt. Elbow-+tenderness along lateral epicondyle. Good ROM of elbow. Strength 5/5  SKIN: Small scaly area on the left forehead, and on the left upper arm, treated with liquid nitrogen X2.  NEURO: Normal strength and tone, sensory exam grossly normal, mentation intact and speech normal  PSYCH: mentation appears normal and affect normal/bright    ASSESSMENT/PLAN:     (Z00.00) Routine general medical examination at a health care facility  (primary encounter diagnosis)  Comment: Pt doing well, is UTD on Tetanus, had normal colonoscopy 2 years ago. Pt is fasting this morning, will draw blood work for further evaluation.  Plan: CBC with platelets, Comprehensive metabolic         panel (BMP + Alb, Alk Phos, ALT, AST, Total.         Bili, TP), Lipid panel reflex to direct LDL,         TSH with free T4 reflex        Follow up based on labs. Next physical due 9/28/18.    (Z23) Need for prophylactic vaccination and inoculation against influenza  Comment: Pt requesting flu shot, will have her receive one today.  Plan: FLU VAC, SPLIT VIRUS IM > 3 YO (QUADRIVALENT)         [48013], Vaccine Administration, Initial         [66749]        Follow up if  "needed.    (Z12.31) Visit for screening mammogram  Comment: Pt due for mammogram, will order and have pt schedule an appointment to have one done.  Plan: MA SCREENING DIGITAL BILAT - Future  (s+30)        Follow up based on results.    (F41.9) Anxiety  Comment: Pt attributes increased anxiety to health concerns with her family members, brother recently had stroke 3 weeks ago, pt at times takes 2 Effexor tablets, will refill and have her continue with Effexor to control her anxiety.  Plan: venlafaxine (EFFEXOR-XR) 75 MG 24 hr capsule        Follow up if needed.    (L28.1) Prurigo nodularis  Comment: Stable, controlled with Hydroxyzine, will refill and have pt continue as prescribed.  Plan: hydrOXYzine (ATARAX) 25 MG tablet        Follow up if needed.    (L30.9) Eczema, unspecified type  Comment: Stable, well controlled with Hydroxyzine, will refill and have pt continue as prescribed.  Plan: hydrOXYzine (ATARAX) 25 MG tablet        Follow up if needed.    Rt. Elbow pain-may be secondary to Golfer's elbow. Exercises given. May consider referral to sports medicine if not improving.     COUNSELING:  Reviewed preventive health counseling, as reflected in patient instructions       Regular exercise       Healthy diet/nutrition       Immunizations    Vaccinated for: Influenza           Colon cancer screening     reports that she has never smoked. She has never used smokeless tobacco.    Estimated body mass index is 30.73 kg/(m^2) as calculated from the following:    Height as of this encounter: 1.575 m (5' 2\").    Weight as of this encounter: 76.2 kg (168 lb).   Weight management plan: Discussed healthy diet and exercise guidelines and patient will follow up in 12 months in clinic to re-evaluate.    Counseling Resources:  ATP IV Guidelines  Pooled Cohorts Equation Calculator  Breast Cancer Risk Calculator  FRAX Risk Assessment  ICSI Preventive Guidelines  Dietary Guidelines for Americans, 2010  USDA's MyPlate  ASA " Prophylaxis  Lung CA Screening    The information in this document, created by the medical scribe for me, accurately reflects the services I personally performed and the decisions made by me. I have reviewed and approved this document for accuracy prior to leaving the patient care area.  September 28, 2017 2:34 PM    Karen Weiler, MD  [facility]    Injectable Influenza Immunization Documentation    1.  Is the person to be vaccinated sick today?   No    2. Does the person to be vaccinated have an allergy to a component   of the vaccine?   No    3. Has the person to be vaccinated ever had a serious reaction   to influenza vaccine in the past?   No    4. Has the person to be vaccinated ever had Guillain-Barré syndrome?   No    Form completed by   Miladis Johnson Certified Medical Assistant

## 2017-09-28 NOTE — NURSING NOTE
"Chief Complaint   Patient presents with     Physical       Initial /78  Pulse 75  Temp 98  F (36.7  C) (Oral)  Ht 5' 2\" (1.575 m)  Wt 168 lb (76.2 kg)  SpO2 97%  BMI 30.73 kg/m2 Estimated body mass index is 30.73 kg/(m^2) as calculated from the following:    Height as of this encounter: 5' 2\" (1.575 m).    Weight as of this encounter: 168 lb (76.2 kg).  Medication Reconciliation: complete   Miladis Johnson Certified Medical Assistant    "

## 2017-09-28 NOTE — MR AVS SNAPSHOT
After Visit Summary   9/28/2017    Austin Shaffer    MRN: [id number]           Patient Information     Date Of Birth          1955        Visit Information        Provider Department      9/28/2017 1:40 PM Weiler, Karen, MD [facility] [place]        Today's Diagnoses     Routine general medical examination at a health care facility    -  1    Need for prophylactic vaccination and inoculation against influenza        Visit for screening mammogram        Anxiety        Prurigo nodularis        Eczema, unspecified type          Care Instructions                       Golfer's Elbow (Medial Epicondylitis)                What is golfer's elbow?   Golfer's elbow (medial epicondylitis) is a painful inflammation of the bony bump on the inner side of the elbow.   The elbow joint is made up of the bone in the upper arm (humerus) and one of the bones in the lower arm (ulna). The bony bumps at the bottom of the humerus are called the epicondyles. The bump on the side closest to the body is called the medial epicondyle.   The tendons of the muscles that work to bend your wrist attach at the medial epicondyle. Medial epicondylitis is also referred to as wrist flexor tendinopathy or elbow tendinopathy.   How does it occur?   Golfer's elbow is caused by overuse of the muscles that bend your fingers and wrist. When these muscles are overused, the tendons are repeatedly pulled where they attach to the bone. As a result, the tendons get inflamed. This commonly happens in sports such as golf, in throwing sports, and in racquet sports. It may also happen in work activities like carpentry or typing.   If you have had tendinopathy for a long time, scar tissue may develop in the tendon. This is called tendinosis.   What are the symptoms?   Golfer's elbow causes pain on the bony bump on the inner side of the elbow. You may also have pain along the entire inner side of the forearm when the wrist is bent. You may  have pain when you make a fist.   How is it diagnosed?   Your healthcare provider will examine your elbow and check for tenderness.   How is it treated?   To treat this condition:   Put an ice pack, gel pack, or package of frozen vegetables, wrapped in a cloth on the area every 3 to 4 hours, for up to 20 minutes at a time.   You could also do ice massage. To do this, first freeze water in a Styrofoam cup, then peel the top of the cup away to expose the ice. Hold the bottom of the cup and rub the ice over your elbow for 5 to 10 minutes. Do this 3 to 5 times a day for the first 2 days.   Raise the elbow on a pillow when you are sitting or lying down.   Use an elastic bandage around the elbow, or a tennis elbow strap just below the tender spot on the elbow as directed by your provider.   Take an anti-inflammatory such as ibuprofen, or other medicine as directed by your provider. Nonsteroidal anti-inflammatory medicines (NSAIDs) may cause stomach bleeding and other problems. These risks increase with age. Read the label and take as directed. Unless recommended by your healthcare provider, do not take for more than 10 days.   While you recover from your injury, you will need to change your sport or activity to one that does not make your condition worse. For example, walk instead of playing golf, or write things out by hand instead of typing.   Follow your provider's instructions for doing exercises to help you recover.   In severe cases, you may need surgery.   How long will the effects last?   The length of recovery depends on many factors such as your age, health, and if you have had a previous injury. Recovery time also depends on the severity of the injury. A mild injury may recover within a few weeks, whereas a severe injury may take 6 weeks or longer to recover. This problem can sometimes be long-lasting and can even come back once you are better. You need to stop doing the activities that cause pain until the  elbow has healed. If you keep doing activities that cause pain, your symptoms will return and it will take longer to recover.   When can I return to my normal activities?   Everyone recovers from an injury at a different rate. Return to your activities depends on how soon your elbow recovers, not by how many days or weeks it has been since your injury has occurred. In general, the longer you have symptoms before you start treatment, the longer it will take to get better. The goal is to return to your normal activities as soon as is safely possible. If you return too soon you may worsen your injury.   You may return when you are able to forcefully  a bat or golf club, or do activities such as working at a keyboard without pain in your elbow. It is important that there is no swelling around your injured elbow and that it is as strong as the uninjured elbow. You must have full range of motion of your elbow.   How can it be prevented?   Golfer's elbow occurs because you overuse the muscles that bend your wrist. Slow down activities that cause pain. Wearing a tennis elbow strap and doing elbow stretching exercises may help prevent this problem.          Golfer's Elbow (Medial Epicondylitis) Rehabilitation Exercises              You may do the stretching exercises right away. You may do the strengthening exercises when stretching is nearly painless.   Stretching exercises   Wrist active range of motion: Flexion and extension: Bend the wrist of your injured arm forward and back as far as you can. Do 3 sets of 10.   Wrist stretch: Press the back of the hand on your injured side with your other hand to help bend your wrist. Hold for 15 to 30 seconds. Next, stretch the hand back by pressing the fingers in a backward direction. Hold for 15 to 30 seconds. Keep the arm on your injured side straight during this exercise. Do 3 sets.   Forearm pronation and supination: Bend the elbow of your injured arm 90 degrees, keeping your  elbow at your side. Turn your palm up and hold for 5 seconds. Then slowly turn your palm down and hold for 5 seconds. Make sure you keep your elbow at your side and bent 90 degrees while you do the exercise. Do 3 sets of 10.   Strengthening exercises   Eccentric wrist flexion: Hold a can or hammer handle in the hand of your injured side with your palm up. Use the hand on the side that is not injured to bend your wrist up. Then let go of your wrist and use just your injured side to lower the weight slowly back to the starting position. Do 3 sets of 15. Gradually increase the weight you are holding.   Eccentric wrist extension: Hold a soup can or hammer handle in the hand of your injured side with your palm facing down. Use the hand on the side that is not injured to bend your wrist up. Then let go of your wrist and use just your injured side to lower the weight slowly back to the starting position. Do 3 sets of 15. Gradually increase the weight you are holding.    strengthening: Squeeze a soft rubber ball and hold the squeeze for 5 seconds. Do 3 sets of 10.   Forearm pronation and supination strengthening: Hold a soup can or hammer handle in your hand and bend your elbow 90 degrees. Slowly turn your hand so your palm is up and then down. Do 3 sets of 10.   Resisted elbow flexion and extension: Hold a can of soup with your palm up. Slowly bend your elbow so that your hand is coming toward your shoulder. Then lower it slowly so your arm is completely straight. Do 3 sets of 10. Slowly increase the weight you are using.                Published by [employer].  This content is reviewed periodically and is subject to change as new health information becomes available. The information is intended to inform and educate and is not a replacement for medical evaluation, advice, diagnosis or treatment by a healthcare professional.   Written by Nora Javier, MS, PT, and Krissy Gleason, PT, [facility], [facility], for [employer].   ? 2010  "[facility] and/or its affiliates. All Rights Reserved.   Copyright   [employer] 2011                Follow-ups after your visit        Future tests that were ordered for you today     Open Future Orders        Priority Expected Expires Ordered    MA SCREENING DIGITAL BILAT - Future  (s+30) Routine  9/28/2018 9/28/2017            Who to contact     If you have questions or need follow up information about today's clinic visit or your schedule please contact [facility] [place] directly at [phone].  Normal or non-critical lab and imaging results will be communicated to you by [employer], letter or phone within 4 business days after the clinic has received the results. If you do not hear from us within 7 days, please contact the clinic through [employer] or phone. If you have a critical or abnormal lab result, we will notify you by phone as soon as possible.  Submit refill requests through [employer] or call your pharmacy and they will forward the refill request to us. Please allow 3 business days for your refill to be completed.          Additional Information About Your Visit        [employer] Information     [employer] gives you secure access to your electronic health record. If you see a primary care provider, you can also send messages to your care team and make appointments. If you have questions, please call your primary care clinic.  If you do not have a primary care provider, please call [phone] and they will assist you.        Care EveryWhere ID     This is your Care EveryWhere ID. This could be used by other organizations to access your [facility] medical records  [id number]        Your Vitals Were     Pulse Temperature Height Pulse Oximetry BMI (Body Mass Index)       75 98  F (36.7  C) (Oral) 5' 2\" (1.575 m) 97% 30.73 kg/m2        Blood Pressure from Last 3 Encounters:   09/28/17 120/78   04/28/17 116/80   09/15/16 126/72    Weight from Last 3 Encounters:   09/28/17 168 lb (76.2 kg) "   04/28/17 167 lb 12.8 oz (76.1 kg)   09/15/16 158 lb (71.7 kg)              We Performed the Following     Asthma Action Plan (AAP)     CBC with platelets     Comprehensive metabolic panel (BMP + Alb, Alk Phos, ALT, AST, Total. Bili, TP)     DEPRESSION ACTION PLAN (DAP)     FLU VAC, SPLIT VIRUS IM > 3 YO (QUADRIVALENT) [04834]     Lipid panel reflex to direct LDL     TSH with free T4 reflex     Vaccine Administration, Initial [30338]          Where to get your medicines      These medications were sent to [facility] [id number] [facility] [address] AT [facility] OF [facility] [address] & [facility]  [address] [place] [address] [address], [facility] [address]    Hours:  24-hours Phone:  [phone]     hydrOXYzine 25 MG tablet    venlafaxine 75 MG 24 hr capsule          Primary Care Provider Office Phone # Fax #    Karen Weiler, [phone] [address]  [place] [address]        Equal Access to Services     RITA [facility]ON[facility] AH: Hadii aad ku hadasho Soomaali, waaxda luqadaha, qaybta kaalmada adeegyada, waxay idiin haymarcn carlos eduardo michele . So [facility] [phone].    ATENCIÓN: Si habla español, tiene a baez disposición servicios gratuitos de asistencia lingüística. Llame al [phone].    We comply with applicable federal civil rights laws and [place] laws. We do not discriminate on the basis of race, color, national origin, age, disability sex, sexual orientation or gender identity.            Thank you!     Thank you for choosing [facility]  for your care. Our goal is always to provide you with excellent care. Hearing back from our patients is one way we can continue to improve our services. Please take a few minutes to complete the written survey that you may receive in the mail after your visit with us. Thank you!             Your Updated Medication List - Protect others around you: Learn how to safely use, store and throw away your medicines at www.disposemymeds.org.          This list is  accurate as of: 9/28/17  2:30 PM.  Always use your most recent med list.                   Brand Name Dispense Instructions for use Diagnosis    albuterol 108 (90 BASE) MCG/ACT Inhaler    PROAIR HFA    3 Inhaler    Inhale 2 puffs into the lungs every 6 hours as needed for shortness of breath / dyspnea    Intermittent asthma, uncomplicated       * betamethasone dipropionate 0.05 % cream    DIPROSONE    45 g    Apply to trunk, arms and legs bid for 10-14 consecutive days, Not to be used on face or groin    Prurigo nodularis, Eczema       * betamethasone dipropionate 0.05 % lotion    DIPROSONE    60 mL    Apply to scalp at bedtime when needed    Prurigo nodularis       hydrOXYzine 25 MG tablet    ATARAX    90 tablet    1 tab at bedtime    Prurigo nodularis, Eczema, unspecified type       LORazepam 0.5 MG tablet    ATIVAN    30 tablet    Take 0.5-1 tablets (0.25-0.5 mg) by mouth every 8 hours as needed for anxiety    Anxiety       order for DME     1 Device    Equipment being ordered: walking boot.    Toe pain       venlafaxine 75 MG 24 hr capsule    EFFEXOR-XR    90 capsule    Take 1 capsule (75 mg) by mouth daily    Anxiety       * Notice:  This list has 2 medication(s) that are the same as other medications prescribed for you. Read the directions carefully, and ask your doctor or other care provider to review them with you.

## 2017-09-29 ASSESSMENT — ASTHMA QUESTIONNAIRES: ACT_TOTALSCORE: 21

## 2017-09-29 ASSESSMENT — ANXIETY QUESTIONNAIRES: GAD7 TOTAL SCORE: 10

## 2017-09-29 NOTE — PROGRESS NOTES
Dear Austin,    Dr. Weiler has reviewed your recent labs:  -Liver and gallbladder tests are normal. (ALT,AST, Alk phos, bilirubin), kidney function is normal (Cr, GFR), Sodium is normal, Potassium is normal, Calcium is normal, Glucose is normal (diabetes screening test).   -LDL(bad) cholesterol level is elevated.  A diet high in fat and simple carbohydrates, genetics and being overweight can contribute to this. ADVISE: Exercise, a low fat/low carbohydrate diet and weight loss are helpful to improve this.  Rechecking your fasting cholesterol panel in 12 months is recommended.  -TSH (thyroid stimulating hormone) level is normal which indicates normal thyroid function.  -Normal red blood cell (hgb) levels, normal white blood cell count and normal platelet levels.    If you have further questions about the interpretation of your labs, labtestsonline.org is a good website to check out for further information.    Please contact the clinic if you have additional questions.  Thank you.    Sincerely,    Bettie Almanzar PA-C  Physician extender for Dr. Karen Weiler

## 2017-10-03 ENCOUNTER — MYC MEDICAL ADVICE (OUTPATIENT)
Dept: FAMILY MEDICINE | Facility: CLINIC | Age: 62
End: 2017-10-03

## 2017-10-16 ENCOUNTER — MYC MEDICAL ADVICE (OUTPATIENT)
Dept: FAMILY MEDICINE | Facility: CLINIC | Age: 62
End: 2017-10-16

## 2017-10-16 DIAGNOSIS — F41.9 ANXIETY: Primary | ICD-10-CM

## 2017-10-16 NOTE — TELEPHONE ENCOUNTER
Please see [employer] message. Please advise. Thank you.  Luisa Sim RN, BSN  [facility]

## 2017-10-17 ENCOUNTER — MYC MEDICAL ADVICE (OUTPATIENT)
Dept: FAMILY MEDICINE | Facility: CLINIC | Age: 62
End: 2017-10-17

## 2017-10-17 NOTE — TELEPHONE ENCOUNTER
Please see [employer] message along with 10/16/17 message.  Luisa Sim, RN, BSN  [facility]

## 2017-11-09 ENCOUNTER — MYC REFILL (OUTPATIENT)
Dept: FAMILY MEDICINE | Facility: CLINIC | Age: 62
End: 2017-11-09

## 2017-11-09 DIAGNOSIS — F41.9 ANXIETY: ICD-10-CM

## 2017-11-09 RX ORDER — LORAZEPAM 0.5 MG/1
0.25-0.5 TABLET ORAL EVERY 8 HOURS PRN
Qty: 30 TABLET | Refills: 0 | Status: SHIPPED | OUTPATIENT
Start: 2017-11-09 | End: 2017-12-09

## 2017-11-09 RX ORDER — VENLAFAXINE HYDROCHLORIDE 75 MG/1
75 CAPSULE, EXTENDED RELEASE ORAL DAILY
Qty: 90 CAPSULE | Refills: 3 | Status: CANCELLED | OUTPATIENT
Start: 2017-11-09

## 2017-11-09 NOTE — TELEPHONE ENCOUNTER
Ativan      Last Written Prescription Date:  9/20/17  Last Fill Quantity: 30,   # refills: 1  Last Office visit:  9/28/17     Routing refill request to provider for review/approval because:  Drug not on the [facility], [facility] or [facility] refill protocol or controlled substance  Luisa Sim RN, BSN  [facility]

## 2017-11-09 NOTE — TELEPHONE ENCOUNTER
Message from MyChart:  Original authorizing provider: Karen Weiler, MD    Austin CAMACHOCirilo Edmund would like a refill of the following medications:  LORazepam (ATIVAN) 0.5 MG tablet [Karen Weiler, MD]  venlafaxine (EFFEXOR-XR) 75 MG 24 hr capsule [Karen Weiler, MD]    Preferred pharmacy: [facility] [address] [place] [address] [address] AT [place] [address] & [facility]    Comment:

## 2017-11-09 NOTE — TELEPHONE ENCOUNTER
The requested prescription(s) has/have been approved and has/have been printed and signed. This was forwarded to the patient care pool to fax to the patient's preferred pharmacy.     [facility] [address] - [place], [place] - [address] [facility] [address] AT [facility] OF [facility] 13 & [facility] [address] - [place], [place] - [address] [facility] AT [facility]. & [facility]    Reji Patterson Jr

## 2017-12-09 DIAGNOSIS — F41.9 ANXIETY: ICD-10-CM

## 2017-12-11 NOTE — TELEPHONE ENCOUNTER
LORazepam (ATIVAN) 0.5 MG tablet      Last Written Prescription Date:  11/9/17  Last Fill Quantity: 30,   # refills: 0  Last Office Visit: 9/28/17  Future Office visit:       Routing refill request to provider for review/approval because:  Drug not on the [facility], [facility] or [facility] refill protocol or controlled substance  Doris Luke RN- Triage [employer]

## 2017-12-13 NOTE — TELEPHONE ENCOUNTER
Call placed to Austin, no answer. Message left to return call to clinic.     Below information regarding refills needs to be communicated to Aminata. Per previous mychart encounter Dr. Patterson did mychart her this information. It looks like she was unable to schedule prior to leaving [place] and may be returning in January. Patient will need to find a PMD where she is currently at this time if she needs refills prior to her return to [place], as per below no further refills with be authorized without face to face visit/establish care as Dr. Weiler no longer .  Shelly Dillon R.N.

## 2017-12-13 NOTE — TELEPHONE ENCOUNTER
I see these are still going to a pharmacy in [place].  If that is indeed the case and she is still in [place], Ms. Shaffer needs to be seen to establish new primary care and discuss her use of benzodiazepine medications.  I'm not comfortable continuing to refill these without a face to face visit.  Please call patient.     Reji Patterson MD

## 2017-12-14 RX ORDER — LORAZEPAM 0.5 MG/1
TABLET ORAL
Qty: 30 TABLET | Refills: 0 | Status: SHIPPED | OUTPATIENT
Start: 2017-12-14 | End: 2018-05-07

## 2017-12-14 NOTE — TELEPHONE ENCOUNTER
Patient calling back. Reviewed messages below with her. She reports that she is now in [place] until the end of March. She states that she was not able to get an appointment with MD [facility] before they left. The reason they use [facility] in [place] is due to an insurance issue - she states that her insurance will not cover her to fill her prescriptions in [place] so, her daughter picks up her prescriptions here and then ships them to her in [place]. She is also unable to see a PCP in [place] as it would be out of network.    Patient can be reached at [phone] (detailed voicemail is OK).    Please advise. Thank you.  Luisa Sim RN, BSN  [facility]

## 2017-12-14 NOTE — TELEPHONE ENCOUNTER
Below information reviewed.  Will need to establish new primary care when she returns to [place].  The requested prescription(s) has/have been approved and has/have been printed and signed. This was forwarded to the patient care pool to fax to the patient's preferred pharmacy.     [facility] [address] [place], [place] - [address] AT [facility] OF [facility] 13 & [facility] [address] - [place], [place] - [address] [facility] AT [facility]. & [facility]    Reji Patterson Jr

## 2017-12-14 NOTE — TELEPHONE ENCOUNTER
Prescription of lorazepam was faxed to [facility] [place]  ECO message sent  Date:December 14, 2017  Signature:Heidy GROSS

## 2018-04-17 ENCOUNTER — MYC MEDICAL ADVICE (OUTPATIENT)
Dept: FAMILY MEDICINE | Facility: CLINIC | Age: 63
End: 2018-04-17

## 2018-04-30 ENCOUNTER — MYC MEDICAL ADVICE (OUTPATIENT)
Dept: FAMILY MEDICINE | Facility: CLINIC | Age: 63
End: 2018-04-30

## 2018-04-30 NOTE — TELEPHONE ENCOUNTER
Routing to clinic TC to assist with scheduling  Luisa Sim RN, BSN  [facility] [place]

## 2018-05-07 ENCOUNTER — OFFICE VISIT (OUTPATIENT)
Dept: FAMILY MEDICINE | Facility: CLINIC | Age: 63
End: 2018-05-07
Payer: COMMERCIAL

## 2018-05-07 VITALS
HEIGHT: 62 IN | HEART RATE: 74 BPM | DIASTOLIC BLOOD PRESSURE: 79 MMHG | TEMPERATURE: 97.5 F | OXYGEN SATURATION: 98 % | SYSTOLIC BLOOD PRESSURE: 128 MMHG | WEIGHT: 172.6 LBS | BODY MASS INDEX: 31.76 KG/M2

## 2018-05-07 DIAGNOSIS — L28.1 PRURIGO NODULARIS: ICD-10-CM

## 2018-05-07 DIAGNOSIS — F41.9 ANXIETY: ICD-10-CM

## 2018-05-07 DIAGNOSIS — M67.432 GANGLION CYST OF WRIST, LEFT: Primary | ICD-10-CM

## 2018-05-07 DIAGNOSIS — J45.20 MILD INTERMITTENT ASTHMA WITHOUT COMPLICATION: ICD-10-CM

## 2018-05-07 DIAGNOSIS — F33.1 MODERATE RECURRENT MAJOR DEPRESSION (H): ICD-10-CM

## 2018-05-07 PROCEDURE — 99214 OFFICE O/P EST MOD 30 MIN: CPT | Performed by: FAMILY MEDICINE

## 2018-05-07 RX ORDER — BETAMETHASONE DIPROPIONATE 0.5 MG/G
LOTION TOPICAL
Qty: 60 ML | Refills: 1 | Status: SHIPPED | OUTPATIENT
Start: 2018-05-07 | End: 2020-10-19

## 2018-05-07 RX ORDER — LORAZEPAM 0.5 MG/1
TABLET ORAL
Qty: 30 TABLET | Refills: 0 | Status: SHIPPED | OUTPATIENT
Start: 2018-05-07 | End: 2018-07-18

## 2018-05-07 ASSESSMENT — ANXIETY QUESTIONNAIRES
5. BEING SO RESTLESS THAT IT IS HARD TO SIT STILL: SEVERAL DAYS
7. FEELING AFRAID AS IF SOMETHING AWFUL MIGHT HAPPEN: NOT AT ALL
2. NOT BEING ABLE TO STOP OR CONTROL WORRYING: SEVERAL DAYS
1. FEELING NERVOUS, ANXIOUS, OR ON EDGE: MORE THAN HALF THE DAYS
IF YOU CHECKED OFF ANY PROBLEMS ON THIS QUESTIONNAIRE, HOW DIFFICULT HAVE THESE PROBLEMS MADE IT FOR YOU TO DO YOUR WORK, TAKE CARE OF THINGS AT HOME, OR GET ALONG WITH OTHER PEOPLE: SOMEWHAT DIFFICULT
6. BECOMING EASILY ANNOYED OR IRRITABLE: SEVERAL DAYS
GAD7 TOTAL SCORE: 8
3. WORRYING TOO MUCH ABOUT DIFFERENT THINGS: MORE THAN HALF THE DAYS

## 2018-05-07 ASSESSMENT — PATIENT HEALTH QUESTIONNAIRE - PHQ9: 5. POOR APPETITE OR OVEREATING: SEVERAL DAYS

## 2018-05-07 NOTE — PROGRESS NOTES
SUBJECTIVE:   Austin Shaffer is a 62 year old female who presents to clinic today for the following health issues:      Anxiety Follow-Up    Status since last visit: stable     Other associated symptoms: itching     Complicating factors:   Significant life event: No   Current substance abuse: None  Depression symptoms: No  ELEAZAR-7 SCORE 9/28/2017 9/28/2017 5/7/2018   Total Score - - -   Total Score - - -   Total Score 10 10 8       ELEAZAR-7    Amount of exercise or physical activity: 2-3 days/week for an average of 4 hours, golfing sometimes     Problems taking medications regularly: No    Medication side effects: none    Diet: regular (no restrictions)      Derm   Lump on left thumb X2 months. Growing in size. No drainage, heat, or redness but feels uncomfortable when pressure is applied to it.      Asthma Follow-Up    Was ACT completed today?    Yes    ACT Total Scores 5/7/2018   ACT TOTAL SCORE -   ASTHMA ER VISITS -   ASTHMA HOSPITALIZATIONS -   ACT TOTAL SCORE (Goal Greater than or Equal to 20) 22   In the past 12 months, how many times did you visit the emergency room for your asthma without being admitted to the hospital? 0   In the past 12 months, how many times were you hospitalized overnight because of your asthma? 0       Recent asthma triggers that patient is dealing with: None        Problem list and histories reviewed & adjusted, as indicated.  Additional history: as documented    Patient Active Problem List   Diagnosis     Moderate recurrent major depression (H)     Intermittent asthma     CARDIOVASCULAR SCREENING; LDL GOAL LESS THAN 160     Advanced directives, counseling/discussion     Anxiety     Vitamin D deficiency     Past Surgical History:   Procedure Laterality Date     ABDOMEN SURGERY      ? Enteritis as a child, possibly appendix     HYSTERECTOMY TOTAL ABDOMINAL, BILATERAL SALPINGO-OOPHORECTOMY, COMBINED  1998     TONSILLECTOMY         Social History   Substance Use Topics     Smoking  "status: Never Smoker     Smokeless tobacco: Never Used     Alcohol use 0.0 oz/week     0 Standard drinks or equivalent per week      Comment: 0-1     Family History   Problem Relation Age of Onset     Cardiovascular Mother      Hypertension Mother      Cardiovascular Maternal Grandmother      CANCER Father      Kidney     Myocardial Infarction Brother 60     2017     CEREBROVASCULAR DISEASE Brother 63     2017     DIABETES Brother      Myocardial Infarction Brother      Hypertension Brother            Reviewed and updated as needed this visit by clinical staff  Tobacco  Allergies  Meds  Med Hx  Surg Hx  Fam Hx  Soc Hx      Reviewed and updated as needed this visit by Provider         ROS:  Constitutional, HEENT, cardiovascular, pulmonary, gi and gu systems are negative, except as otherwise noted.    OBJECTIVE:     /79  Pulse 74  Temp 97.5  F (36.4  C) (Oral)  Ht 5' 2\" (1.575 m)  Wt 172 lb 9.6 oz (78.3 kg)  SpO2 98%  BMI 31.57 kg/m2  Body mass index is 31.57 kg/(m^2).  GENERAL: healthy, alert and no distress  NECK: no adenopathy, no asymmetry, masses, or scars and thyroid normal to palpation  RESP: lungs clear to auscultation - no rales, rhonchi or wheezes  CV: regular rate and rhythm, normal S1 S2, no S3 or S4, no murmur, click or rub, no peripheral edema and peripheral pulses strong  ABDOMEN: soft, nontender, no hepatosplenomegaly, no masses and bowel sounds normal  MS: Ganglion cyst at base of left thumb.  SKIN:Dry, scaly patch on left occipital scalp      Diagnostic Test Results:  none     ASSESSMENT/PLAN:   1. Ganglion cyst of wrist, left; patient interested in having cyst removed. Will refer to orthopedics.  - ORTHOPEDICS ADULT REFERRAL    2. Anxiety: symptoms stable. Using Ativan sparingly. Continue Effexor XR 75 mg daily. If increased usage of Ativan occurs, could increase Effexor dose.  - LORazepam (ATIVAN) 0.5 MG tablet; TAKE 1/2 TO 1 TABLET BY MOUTH EVERY 8 HOURS AS NEEDED FOR ANXIETY  " Dispense: 30 tablet; Refill: 0    3. Moderate recurrent major depression (H): symptoms stable on current regimen of Effexor XR 75 mg daily. Follow up in 6 months.    4. Prurigo nodularis: had been seen in skin clinic in the past and symptoms improved with Diprosone lotion. Will refill prescription and refer back for further evaluation and recommendations.  - betamethasone dipropionate (DIPROSONE) 0.05 % lotion; Apply to scalp at bedtime when needed  Dispense: 60 mL; Refill: 1  - SKIN CARE REFERRAL    5. Mild intermittent asthma without complication: well controlled. ACT completed today. Continue PRN albuterol.      Screening mammogram order already placed. She will schedule.    Manuel Mancilla,   [facility] [place]

## 2018-05-07 NOTE — MR AVS SNAPSHOT
After Visit Summary   5/7/2018    Austin Shaffer    MRN: [id number]           Patient Information     Date Of Birth          1955        Visit Information        Provider Department      5/7/2018 1:40 PM Manuel Mancilla DO [facility]        Today's Diagnoses     Ganglion cyst of wrist, left    -  1    Anxiety        Moderate recurrent major depression (H)        Prurigo nodularis          Care Instructions      Ganglion Cyst    A ganglion cyst usually is a painless bump on the wrist or finger joint. It connects to the joint capsule and grows like a balloon on a stalk. It is filled with joint fluid. The cause of a ganglion cyst is not known.   If the cyst puts pressure on a nearby nerve it may cause pain. Otherwise, cysts are painless and harmless. Most tend to disappear over time without treatment. Do not try to drain or break the cyst at home. This can cause harm and does not cure the problem.  If you are having pain from the cyst, a temporary wrist splint may be helpful to limit wrist motion. If this does not help, the fluid can be removed from the cyst. This should shrink the size of the cyst. If this doesn t give relief, the ganglion can be removed by surgery.  Home care    If you are having wrist pain, use a wrist splint for 1-2 weeks at a time. You can buy one at many drug stores without a prescription.    You may use over-the-counter pain medicine to control pain, unless another medicine was prescribed. If you have chronic liver or kidney disease or ever had a stomach ulcer or GI bleeding, talk with your healthcare provider before using these medicines.      If a needle was used to drain the cyst fluid or inject medicine into it, keep the site clean and covered with a bandage for the first 24 hours. If a pressure dressing was applied, leave it in place for the time advised.  Follow-up care  Follow up with your healthcare provider, or as advised by our staff. Make an  appointment for a repeat exam if pain continues for more than 2 weeks in a wrist splint.  When to seek medical advice  Call your healthcare provider right away if any of these occur:    Increasing pain in the wrist    Redness over the cyst    Fluid draining from the cyst    Numbness or tingling in the hand or arm  Date Last Reviewed: 11/20/2015 2000-2017 [employer]. [address] [place] [address]. All rights reserved. This information is not intended as a substitute for professional medical care. Always follow your healthcare professional's instructions.                Follow-ups after your visit        Additional Services     ORTHOPEDICS ADULT REFERRAL       Your provider has referred you to: PREFERRED PROVIDERS:  [facility] [place] [phone]   [email or web address]    Please be aware that coverage of these services is subject to the terms and limitations of your health insurance plan.  Call member services at your health plan with any benefit or coverage questions.      Please bring the following to your appointment:    >>   Any x-rays, CTs or MRIs which have been performed.  Contact the facility where they were done to arrange for  prior to your scheduled appointment.    >>   List of current medications   >>   This referral request   >>   Any documents/labs given to you for this referral            SKIN CARE REFERRAL       Your provider [place] you [place] [facility] [place] [facility] [place] [phone]  [email or web address]     Please be aware that coverage of these services is subject to the terms and limitations of your health insurance plan.  Please check with your insurance prior to the appointment to ensure appropriate coverage for any services considered cosmetic in nature or not medically necessary.    Please bring the following with you to your appointment:    (1) Any X-Rays,  "CTs or MRIs which have been performed.  Contact the facility where they were done to arrange for  prior to your scheduled appointment.  Any new CT, MRI or other procedures ordered by your specialist must be performed at a [facility] facility or coordinated by your clinic's referral office.  (2) List of current medications  (3) This referral request   (4) Any documents/labs given to you for this referral                  Follow-up notes from your care team     Return in about 6 months (around 11/7/2018) for depression/anxiety follow up.      Your next 10 appointments already scheduled     May 17, 2018  8:45 AM CDT   MA SCREENING DIGITAL BILATERAL with SVMA1   [facility]    [address] [facility] [address] [phone]           Do not use any powder, lotion or deodorant under your arms or on your breast. If you do, we will ask you to remove it before your exam.  Wear comfortable, two-piece clothing.  If you have any allergies, tell your care team.  Bring any previous mammograms from other facilities or have them mailed to the breast center. Three-dimensional (3D) mammograms are available at [facility] locations in [facility], [facility], [facility], [place], [place], and [place]. [facility] locations include [place] and [facility] in [place]. Benefits of 3D mammograms include: - Improved rate of cancer detection - Decreases your chance of having to go back for more tests, which means fewer: - \"False-positive\" results (This means that there is an abnormal area but it isn't cancer.) - Invasive testing procedures, such as a biopsy or surgery - Can provide clearer images of the breast if you have dense breast tissue. 3D mammography is an optional exam that anyone can have with a 2D mammogram. It doesn't replace or take the place of a 2D mammogram. 2D mammograms remain an effective screening test for all women.  Not all insurance " "companies cover the cost of a 3D mammogram. Check with your insurance.              Who to contact     If you have questions or need follow up information about today's clinic visit or your schedule please contact [facility] [place] directly at [phone].  Normal or non-critical lab and imaging results will be communicated to you by [employer], letter or phone within 4 business days after the clinic has received the results. If you do not hear from us within 7 days, please contact the clinic through "[employer]t or phone. If you have a critical or abnormal lab result, we will notify you by phone as soon as possible.  Submit refill requests through [employer] or call your pharmacy and they will forward the refill request to us. Please allow 3 business days for your refill to be completed.          Additional Information About Your Visit        [employer] Information     [employer] gives you secure access to your electronic health record. If you see a primary care provider, you can also send messages to your care team and make appointments. If you have questions, please call your primary care clinic.  If you do not have a primary care provider, please call [phone] and they will assist you.        Care EveryWhere ID     This is your Care EveryWhere ID. This could be used by other organizations to access your [facility] medical records  [id number]        Your Vitals Were     Pulse Temperature Height Pulse Oximetry BMI (Body Mass Index)       74 97.5  F (36.4  C) (Oral) 5' 2\" (1.575 m) 98% 31.57 kg/m2        Blood Pressure from Last 3 Encounters:   05/07/18 128/79   09/28/17 120/78   04/28/17 116/80    Weight from Last 3 Encounters:   05/07/18 172 lb 9.6 oz (78.3 kg)   09/28/17 168 lb (76.2 kg)   04/28/17 167 lb 12.8 oz (76.1 kg)              We Performed the Following     ORTHOPEDICS ADULT REFERRAL     SKIN CARE REFERRAL          Today's Medication Changes          These changes are accurate as of 5/7/18  2:41 PM.  If you " have any questions, ask your nurse or doctor.               These medicines have changed or have updated prescriptions.        Dose/Directions    LORazepam 0.5 MG tablet   Commonly known as:  ATIVAN   This may have changed:  See the new instructions.   Used for:  Anxiety   Changed by:  Manuel Mancilla, DO        TAKE 1/2 TO 1 TABLET BY MOUTH EVERY 8 HOURS AS NEEDED FOR ANXIETY   Quantity:  30 tablet   Refills:  0            Where to get your medicines      These medications were sent to [facility] [address] [address] AT [facility] [address] [address], [facility] [address]    Hours:  24-hours Phone:  [phone]     betamethasone dipropionate 0.05 % lotion         Some of these will need a paper prescription and others can be bought over the counter.  Ask your nurse if you have questions.     Bring a paper prescription for each of these medications     LORazepam 0.5 MG tablet                Primary Care Provider Office Phone # Fax #    Reji Patterson Jr., [phone] [address]  [place] [address]        Equal Access to Services     [facility] campusLEONOR AH: Hadii francoise ku hadasho Soomaali, waaxda luqadaha, qaybta kaalmada adeegrakesh, juliette saravia. So [facility] [phone].    ATENCIÓN: Si habla español, tiene a baez disposición servicios gratFitness Partnersos de asistencia lingüística. Ethel al [phone].    We comply with applicable federal civil rights laws and [place] laws. We do not discriminate on the basis of race, color, national origin, age, disability, sex, sexual orientation, or gender identity.            Thank you!     Thank you for choosing [facility]  for your care. Our goal is always to provide you with excellent care. Hearing back from our patients is one way we can continue to improve our services. Please take a few minutes to complete the written survey that you may receive in the mail after your  visit with us. Thank you!             Your Updated Medication List - Protect others around you: Learn how to safely use, store and throw away your medicines at www.disposemymeds.org.          This list is accurate as of 5/7/18  2:41 PM.  Always use your most recent med list.                   Brand Name Dispense Instructions for use Diagnosis    albuterol 108 (90 Base) MCG/ACT Inhaler    PROAIR HFA    3 Inhaler    Inhale 2 puffs into the lungs every 6 hours as needed for shortness of breath / dyspnea    Intermittent asthma, uncomplicated       * betamethasone dipropionate 0.05 % cream    DIPROSONE    45 g    Apply to trunk, arms and legs bid for 10-14 consecutive days, Not to be used on face or groin    Prurigo nodularis, Eczema       * betamethasone dipropionate 0.05 % lotion    DIPROSONE    60 mL    Apply to scalp at bedtime when needed    Prurigo nodularis       hydrOXYzine 25 MG tablet    ATARAX    90 tablet    1 tab at bedtime    Prurigo nodularis, Eczema, unspecified type       LORazepam 0.5 MG tablet    ATIVAN    30 tablet    TAKE 1/2 TO 1 TABLET BY MOUTH EVERY 8 HOURS AS NEEDED FOR ANXIETY    Anxiety       order for DME     1 Device    Equipment being ordered: walking boot.    Toe pain       venlafaxine 75 MG 24 hr capsule    EFFEXOR-XR    90 capsule    Take 1 capsule (75 mg) by mouth daily    Anxiety       * Notice:  This list has 2 medication(s) that are the same as other medications prescribed for you. Read the directions carefully, and ask your doctor or other care provider to review them with you.

## 2018-05-07 NOTE — PATIENT INSTRUCTIONS
Ganglion Cyst    A ganglion cyst usually is a painless bump on the wrist or finger joint. It connects to the joint capsule and grows like a balloon on a stalk. It is filled with joint fluid. The cause of a ganglion cyst is not known.   If the cyst puts pressure on a nearby nerve it may cause pain. Otherwise, cysts are painless and harmless. Most tend to disappear over time without treatment. Do not try to drain or break the cyst at home. This can cause harm and does not cure the problem.  If you are having pain from the cyst, a temporary wrist splint may be helpful to limit wrist motion. If this does not help, the fluid can be removed from the cyst. This should shrink the size of the cyst. If this doesn t give relief, the ganglion can be removed by surgery.  Home care    If you are having wrist pain, use a wrist splint for 1-2 weeks at a time. You can buy one at many drug stores without a prescription.    You may use over-the-counter pain medicine to control pain, unless another medicine was prescribed. If you have chronic liver or kidney disease or ever had a stomach ulcer or GI bleeding, talk with your healthcare provider before using these medicines.      If a needle was used to drain the cyst fluid or inject medicine into it, keep the site clean and covered with a bandage for the first 24 hours. If a pressure dressing was applied, leave it in place for the time advised.  Follow-up care  Follow up with your healthcare provider, or as advised by our staff. Make an appointment for a repeat exam if pain continues for more than 2 weeks in a wrist splint.  When to seek medical advice  Call your healthcare provider right away if any of these occur:    Increasing pain in the wrist    Redness over the cyst    Fluid draining from the cyst    Numbness or tingling in the hand or arm  Date Last Reviewed: 11/20/2015 2000-2017 [employer]. [address] [place] [address], [place] [address]. All rights reserved. This  information is not intended as a substitute for professional medical care. Always follow your healthcare professional's instructions.

## 2018-05-08 ENCOUNTER — TELEPHONE (OUTPATIENT)
Dept: FAMILY MEDICINE | Facility: CLINIC | Age: 63
End: 2018-05-08

## 2018-05-08 ASSESSMENT — ANXIETY QUESTIONNAIRES: GAD7 TOTAL SCORE: 8

## 2018-05-08 ASSESSMENT — PATIENT HEALTH QUESTIONNAIRE - PHQ9: SUM OF ALL RESPONSES TO PHQ QUESTIONS 1-9: 9

## 2018-05-08 ASSESSMENT — ASTHMA QUESTIONNAIRES: ACT_TOTALSCORE: 22

## 2018-05-08 NOTE — TELEPHONE ENCOUNTER
Patient referred to [facility] by Dr. Mancilla, Manuel MARTINEZ DO. Called patient, left message to call back if pt would like to schedule appointment with [facility].       Mini Gonzalez MA on 5/8/2018 at 9:35 AM

## 2018-05-10 ENCOUNTER — TRANSFERRED RECORDS (OUTPATIENT)
Dept: HEALTH INFORMATION MANAGEMENT | Facility: CLINIC | Age: 63
End: 2018-05-10

## 2018-05-15 ENCOUNTER — MYC MEDICAL ADVICE (OUTPATIENT)
Dept: FAMILY MEDICINE | Facility: CLINIC | Age: 63
End: 2018-05-15

## 2018-05-16 ENCOUNTER — TELEPHONE (OUTPATIENT)
Dept: FAMILY MEDICINE | Facility: CLINIC | Age: 63
End: 2018-05-16

## 2018-05-16 NOTE — TELEPHONE ENCOUNTER
Epic medication reviewed and agree there would be available refills. Since Luisa MOTA (below) verified this with [facility] and patient on line no further action needed at this time. Shelly Dillon R.N.

## 2018-05-16 NOTE — TELEPHONE ENCOUNTER
Pt called stating Fawad told her that there were not any refills left for Effexor. Most recent [facility] encounter notes to patient she should have a refill available at [facility] pharmacy. I called Fawad with pt on the line and confirmed with pt and pharmacy she does still have refills remaining for this prescription.    Luisa Funk, [facility]  Diabetes and Nutrition Scheduling

## 2018-05-17 ENCOUNTER — RADIANT APPOINTMENT (OUTPATIENT)
Dept: MAMMOGRAPHY | Facility: CLINIC | Age: 63
End: 2018-05-17
Payer: COMMERCIAL

## 2018-05-17 PROCEDURE — 77067 SCR MAMMO BI INCL CAD: CPT | Mod: TC

## 2018-07-18 DIAGNOSIS — F41.9 ANXIETY: ICD-10-CM

## 2018-07-18 NOTE — TELEPHONE ENCOUNTER
LORazepam (ATIVAN) 0.5 MG tablet      Last Written Prescription Date:  5/7/2018  Last Fill Quantity: 30 tablet,   # refills: 0  Last Office Visit: 5/7/2018  Charo  Future Office visit:       Routing refill request to provider for review/approval because:  Drug not on the [facility], [facility] or [facility] refill protocol or controlled substance

## 2018-07-18 NOTE — TELEPHONE ENCOUNTER
RX monitoring [place] ([place]) reviewed:  reviewed- recommend provider review 7/18/2018, please print and place on providers desk.    [place] profile:  [email or web address]    Routing refill request to provider for review/approval because:  Drug not on the [facility] refill protocol     Antoinette Louise RN  [employer] [facility]

## 2018-07-19 RX ORDER — LORAZEPAM 0.5 MG/1
TABLET ORAL
Qty: 30 TABLET | Refills: 0 | Status: SHIPPED | OUTPATIENT
Start: 2018-07-19 | End: 2018-09-07

## 2018-07-19 NOTE — TELEPHONE ENCOUNTER
Chart reviewed. Refill for Ativan signed and placed in TC box.    Manuel Mancilla DO  7/19/2018 11:00 AM

## 2018-07-19 NOTE — TELEPHONE ENCOUNTER
Report printed and placed on SW, DO's for review in SR, MD's absence. Are you able to review and advise?  Luisa Sim, RN, BSN  [facility]

## 2018-09-07 DIAGNOSIS — F41.9 ANXIETY: ICD-10-CM

## 2018-09-07 NOTE — TELEPHONE ENCOUNTER
LORazepam (ATIVAN) 0.5 MG tablet      Last Written Prescription Date:  7/19/2018  Last Fill Quantity: 30 tablet,   # refills: 0  Last Office Visit: 5/7/2018  Future Office visit:       Routing refill request to provider for review/approval because:  Drug not on the [facility], P or [facility] refill protocol or controlled substance

## 2018-09-10 RX ORDER — LORAZEPAM 0.5 MG/1
TABLET ORAL
Qty: 30 TABLET | Refills: 0 | Status: SHIPPED | OUTPATIENT
Start: 2018-09-10 | End: 2018-10-01

## 2018-09-10 NOTE — TELEPHONE ENCOUNTER
The requested prescription(s) has/have been approved and has/have been printed and signed. This was forwarded to the patient care pool to fax to the patient's preferred pharmacy.     [facility] - [place], [place] - [address] AT [facility] [address] & [facility] - [place], [place] - [address] [facility] AT [facility] [facility]    Reji Patterson Jr

## 2018-10-01 ENCOUNTER — OFFICE VISIT (OUTPATIENT)
Dept: FAMILY MEDICINE | Facility: CLINIC | Age: 63
End: 2018-10-01
Payer: COMMERCIAL

## 2018-10-01 VITALS
WEIGHT: 176.9 LBS | SYSTOLIC BLOOD PRESSURE: 108 MMHG | TEMPERATURE: 98.3 F | BODY MASS INDEX: 32.55 KG/M2 | HEIGHT: 62 IN | DIASTOLIC BLOOD PRESSURE: 79 MMHG | HEART RATE: 80 BPM | OXYGEN SATURATION: 99 %

## 2018-10-01 DIAGNOSIS — F33.1 MODERATE RECURRENT MAJOR DEPRESSION (H): ICD-10-CM

## 2018-10-01 DIAGNOSIS — Z13.6 CARDIOVASCULAR SCREENING; LDL GOAL LESS THAN 160: ICD-10-CM

## 2018-10-01 DIAGNOSIS — F41.9 ANXIETY: ICD-10-CM

## 2018-10-01 DIAGNOSIS — Z11.4 SCREENING FOR HIV (HUMAN IMMUNODEFICIENCY VIRUS): ICD-10-CM

## 2018-10-01 DIAGNOSIS — L28.1 PRURIGO NODULARIS: ICD-10-CM

## 2018-10-01 DIAGNOSIS — J45.20 INTERMITTENT ASTHMA, UNCOMPLICATED: ICD-10-CM

## 2018-10-01 DIAGNOSIS — Z00.00 ROUTINE GENERAL MEDICAL EXAMINATION AT A HEALTH CARE FACILITY: Primary | ICD-10-CM

## 2018-10-01 DIAGNOSIS — Z23 NEED FOR PROPHYLACTIC VACCINATION AND INOCULATION AGAINST INFLUENZA: ICD-10-CM

## 2018-10-01 DIAGNOSIS — Z12.11 SCREEN FOR COLON CANCER: ICD-10-CM

## 2018-10-01 DIAGNOSIS — L30.9 ECZEMA, UNSPECIFIED TYPE: ICD-10-CM

## 2018-10-01 DIAGNOSIS — Z13.1 SCREENING FOR DIABETES MELLITUS: ICD-10-CM

## 2018-10-01 LAB
ANION GAP SERPL CALCULATED.3IONS-SCNC: 7 MMOL/L (ref 3–14)
BUN SERPL-MCNC: 11 MG/DL (ref 7–30)
CALCIUM SERPL-MCNC: 9.1 MG/DL (ref 8.5–10.1)
CHLORIDE SERPL-SCNC: 109 MMOL/L (ref 94–109)
CHOLEST SERPL-MCNC: 247 MG/DL
CO2 SERPL-SCNC: 29 MMOL/L (ref 20–32)
CREAT SERPL-MCNC: 0.78 MG/DL (ref 0.52–1.04)
GFR SERPL CREATININE-BSD FRML MDRD: 74 ML/MIN/1.7M2
GLUCOSE SERPL-MCNC: 93 MG/DL (ref 70–99)
HDLC SERPL-MCNC: 59 MG/DL
LDLC SERPL CALC-MCNC: 167 MG/DL
NONHDLC SERPL-MCNC: 188 MG/DL
POTASSIUM SERPL-SCNC: 4.5 MMOL/L (ref 3.4–5.3)
SODIUM SERPL-SCNC: 145 MMOL/L (ref 133–144)
TRIGL SERPL-MCNC: 103 MG/DL

## 2018-10-01 PROCEDURE — 36415 COLL VENOUS BLD VENIPUNCTURE: CPT | Performed by: FAMILY MEDICINE

## 2018-10-01 PROCEDURE — 80061 LIPID PANEL: CPT | Performed by: FAMILY MEDICINE

## 2018-10-01 PROCEDURE — 99396 PREV VISIT EST AGE 40-64: CPT | Performed by: FAMILY MEDICINE

## 2018-10-01 PROCEDURE — 87389 HIV-1 AG W/HIV-1&-2 AB AG IA: CPT | Performed by: FAMILY MEDICINE

## 2018-10-01 PROCEDURE — 80048 BASIC METABOLIC PNL TOTAL CA: CPT | Performed by: FAMILY MEDICINE

## 2018-10-01 RX ORDER — VENLAFAXINE HYDROCHLORIDE 75 MG/1
75 CAPSULE, EXTENDED RELEASE ORAL DAILY
Qty: 90 CAPSULE | Refills: 3 | Status: SHIPPED | OUTPATIENT
Start: 2018-10-01 | End: 2019-10-03

## 2018-10-01 RX ORDER — HYDROXYZINE HYDROCHLORIDE 25 MG/1
TABLET, FILM COATED ORAL
Qty: 90 TABLET | Refills: 3 | Status: SHIPPED | OUTPATIENT
Start: 2018-10-01 | End: 2021-03-26

## 2018-10-01 RX ORDER — LORAZEPAM 0.5 MG/1
TABLET ORAL
Qty: 30 TABLET | Refills: 0 | Status: SHIPPED | OUTPATIENT
Start: 2018-10-01 | End: 2019-05-13

## 2018-10-01 RX ORDER — ALBUTEROL SULFATE 90 UG/1
2 AEROSOL, METERED RESPIRATORY (INHALATION) EVERY 6 HOURS PRN
Qty: 3 INHALER | Refills: 1 | Status: SHIPPED | OUTPATIENT
Start: 2018-10-01 | End: 2020-10-19

## 2018-10-01 ASSESSMENT — ANXIETY QUESTIONNAIRES
3. WORRYING TOO MUCH ABOUT DIFFERENT THINGS: SEVERAL DAYS
2. NOT BEING ABLE TO STOP OR CONTROL WORRYING: SEVERAL DAYS
6. BECOMING EASILY ANNOYED OR IRRITABLE: SEVERAL DAYS
5. BEING SO RESTLESS THAT IT IS HARD TO SIT STILL: NOT AT ALL
1. FEELING NERVOUS, ANXIOUS, OR ON EDGE: SEVERAL DAYS
IF YOU CHECKED OFF ANY PROBLEMS ON THIS QUESTIONNAIRE, HOW DIFFICULT HAVE THESE PROBLEMS MADE IT FOR YOU TO DO YOUR WORK, TAKE CARE OF THINGS AT HOME, OR GET ALONG WITH OTHER PEOPLE: SOMEWHAT DIFFICULT
7. FEELING AFRAID AS IF SOMETHING AWFUL MIGHT HAPPEN: NOT AT ALL
GAD7 TOTAL SCORE: 5

## 2018-10-01 ASSESSMENT — PATIENT HEALTH QUESTIONNAIRE - PHQ9: 5. POOR APPETITE OR OVEREATING: SEVERAL DAYS

## 2018-10-01 NOTE — TELEPHONE ENCOUNTER
"Requested Prescriptions   Pending Prescriptions Disp Refills     venlafaxine (EFFEXOR-XR) 75 MG 24 hr capsule [Pharmacy Med Name: VENLAFAXINE ER 75MG CAPSULES]  This may be a duplicate refill request.  Last Written Prescription Date:  10/1/2018  Last Fill Quantity: 90 capsule,  # refills: 3   Last office visit: 10/1/2018 with prescribing provider:  Charo   Future Office Visit:       90 capsule 0     Sig: TAKE 1 CAPSULE(75 MG) BY MOUTH DAILY    Serotonin-Norepinephrine Reuptake Inhibitors  Failed    10/1/2018  4:05 PM       Failed - Normal serum creatinine on file in past 12 months    Recent Labs   Lab Test  09/28/17   1437   CR  0.76            Passed - Blood pressure under 140/90 in past 12 months    BP Readings from Last 3 Encounters:   10/01/18 108/79   05/07/18 128/79   09/28/17 120/78            Passed - Recent (12 mo) or future (30 days) visit within the authorizing provider's specialty    Patient had office visit in the last 12 months or has a visit in the next 30 days with authorizing provider or within the authorizing provider's specialty.  See \"Patient Info\" tab in inbasket, or \"Choose Columns\" in Meds & Orders section of the refill encounter.           Passed - Patient is age 18 or older       Passed - No active pregnancy on record       Passed - No positive pregnancy test in past 12 months          "

## 2018-10-01 NOTE — LETTER
My Asthma Action Plan  Name: Austin Shaffer   YOB: 1955  Date: 10/1/2018   My doctor: Manuel Mancilla, DO   My clinic: [facility] [place]        My Control Medicine: None  My Rescue Medicine: Albuterol (Proair/Ventolin/Proventil) inhaler 2 puffs every 6 hours as needed    My Asthma Severity:   Avoid your asthma triggers:   None            GREEN ZONE   Good Control    I feel good    No cough or wheeze    Can work, sleep and play without asthma symptoms       Take your asthma control medicine every day.     1. If exercise triggers your asthma, take your rescue medication    15 minutes before exercise or sports, and    During exercise if you have asthma symptoms  2. Spacer to use with inhaler: If you have a spacer, make sure to use it with your inhaler             YELLOW ZONE Getting Worse  I have ANY of these:    I do not feel good    Cough or wheeze    Chest feels tight    Wake up at night   1. Keep taking your Green Zone medications  2. Start taking your rescue medicine:    every 20 minutes for up to 1 hour. Then every 4 hours for 24-48 hours.  3. If you stay in the Yellow Zone for more than 12-24 hours, contact your doctor.  4. If you do not return to the Green Zone in 12-24 hours or you get worse, start taking your oral steroid medicine if prescribed by your provider.           RED ZONE Medical Alert - Get Help  I have ANY of these:    I feel awful    Medicine is not helping    Breathing getting harder    Trouble walking or talking    Nose opens wide to breathe       1. Take your rescue medicine NOW  2. If your provider has prescribed an oral steroid medicine, start taking it NOW  3. Call your doctor NOW  4. If you are still in the Red Zone after 20 minutes and you have not reached your doctor:    Take your rescue medicine again and    Call 911 or go to the emergency room right away    See your regular doctor within 2 weeks of an Emergency Room or Urgent Care visit for follow-up  treatment.          Annual Reminders:  Meet with Asthma Educator,  Flu Shot in the Fall, consider Pneumonia Vaccination for patients with asthma (aged 19 and older).    Pharmacy:    [id number] - [place], [place] - [address] [facility] [address] [id number] - [place] - [address] AT [facility] [address]  [facility] - [place], [place] - [address]                      Asthma Triggers  How To Control Things That Make Your Asthma Worse    Triggers are things that make your asthma worse.  Look at the list below to help you find your triggers and what you can do about them.  You can help prevent asthma flare-ups by staying away from your triggers.      Trigger                                                          What you can do   Cigarette Smoke  Tobacco smoke can make asthma worse. Do not allow smoking in your home, car or around you.  Be sure no one smokes at a child s day care or school.  If you smoke, ask your health care provider for ways to help you quit.  Ask family members to quit too.  Ask your health care provider for a referral to Quit Plan to help you quit smoking, or call 7-065-446-PLAN.     Colds, Flu, Bronchitis  These are common triggers of asthma. Wash your hands often.  Don t touch your eyes, nose or mouth.  Get a flu shot every year.     Dust Mites  These are tiny bugs that live in cloth or carpet. They are too small to see. Wash sheets and blankets in hot water every week.   Encase pillows and mattress in dust mite proof covers.  Avoid having carpet if you can. If you have carpet, vacuum weekly.   Use a dust mask and HEPA vacuum.   Pollen and Outdoor Mold  Some people are allergic to trees, grass, or weed pollen, or molds. Try to keep your windows closed.  Limit time out doors when pollen count is high.   Ask you health care provider about taking medicine during allergy season.     Animal Dander  Some people  are allergic to skin flakes, urine or saliva from pets with fur or feathers. Keep pets with fur or feathers out of your home.    If you can t keep the pet outdoors, then keep the pet out of your bedroom.  Keep the bedroom door closed.  Keep pets off cloth furniture and away from stuffed toys.     Mice, Rats, and Cockroaches  Some people are allergic to the waste from these pests.   Cover food and garbage.  Clean up spills and food crumbs.  Store grease in the refrigerator.   Keep food out of the bedroom.   Indoor Mold  This can be a trigger if your home has high moisture. Fix leaking faucets, pipes, or other sources of water.   Clean moldy surfaces.  Dehumidify basement if it is damp and smelly.   Smoke, Strong Odors, and Sprays  These can reduce air quality. Stay away from strong odors and sprays, such as perfume, powder, hair spray, paints, smoke incense, paint, cleaning products, candles and new carpet.   Exercise or Sports  Some people with asthma have this trigger. Be active!  Ask your doctor about taking medicine before sports or exercise to prevent symptoms.    Warm up for 5-10 minutes before and after sports or exercise.     Other Triggers of Asthma  Cold air:  Cover your nose and mouth with a scarf.  Sometimes laughing or crying can be a trigger.  Some medicines and food can trigger asthma.

## 2018-10-01 NOTE — LETTER
October 3, 2018      Austin Shaffer  [address]  [place] [address]        Dear ,    We are writing to inform you of your test results.    -Kidney function is normal (Cr, GFR), Sodium is normal, Potassium is normal, Calcium is normal, Glucose is normal (diabetes screening test).   -LDL(bad) cholesterol level is elevated,  A diet high in fat and simple carbohydrates, genetics and being overweight can contribute to this. ADVISE: Exercise, a low fat, low carbohydrate diet and weight control are helpful to improve this.  Rechecking your fasting cholesterol panel in 12 months is recommended (Lipid w/ LDL reflex, DX:hyperlipidemia)   -Screening HIV test is negative.     Resulted Orders   HIV Screening   Result Value Ref Range    HIV Antigen Antibody Combo Nonreactive NR^Nonreactive          Comment:      HIV-1 p24 Ag & HIV-1/HIV-2 Ab Not Detected   Basic metabolic panel   Result Value Ref Range    Sodium 145 (H) 133 - 144 mmol/L    Potassium 4.5 3.4 - 5.3 mmol/L    Chloride 109 94 - 109 mmol/L    Carbon Dioxide 29 20 - 32 mmol/L    Anion Gap 7 3 - 14 mmol/L    Glucose 93 70 - 99 mg/dL      Comment:      Fasting specimen    Urea Nitrogen 11 7 - 30 mg/dL    Creatinine 0.78 0.52 - 1.04 mg/dL    GFR Estimate 74 >60 mL/min/1.7m2      Comment:      Non  GFR Calc    GFR Estimate If Black 89 >60 mL/min/1.7m2      Comment:       GFR Calc    Calcium 9.1 8.5 - 10.1 mg/dL   Lipid panel reflex to direct LDL Fasting   Result Value Ref Range    Cholesterol 247 (H) <200 mg/dL      Comment:      Desirable:       <200 mg/dl    Triglycerides 103 <150 mg/dL      Comment:      Fasting specimen    HDL Cholesterol 59 >49 mg/dL    LDL Cholesterol Calculated 167 (H) <100 mg/dL      Comment:      Above desirable:  100-129 mg/dl  Borderline High:  130-159 mg/dL  High:             160-189 mg/dL  Very high:       >189 mg/dl      Non HDL Cholesterol 188 (H) <130 mg/dL      Comment:      Above  Desirable:  130-159 mg/dl  Borderline high:  160-189 mg/dl  High:             190-219 mg/dl  Very high:       >219 mg/dl         If you have any questions or concerns, please call the clinic at the number listed above.       Sincerely,        Manuel Mancilla, DO

## 2018-10-01 NOTE — PROGRESS NOTES
SUBJECTIVE:   CC: Austin Shaffer is an 62 year old woman who presents for preventive health visit.     Healthy Habits:    Do you get at least three servings of calcium containing foods daily (dairy, green leafy vegetables, etc.)? yes    Amount of exercise or daily activities, outside of work: golfing once per week for 4-5 hours     Problems taking medications regularly No    Medication side effects: No    Have you had an eye exam in the past two years? no    Do you see a dentist twice per year? yes    Do you have sleep apnea, excessive snoring or daytime drowsiness? Increased snoring       Requesting refill of lorazepam: Austin states she only needs sparingly but will be visiting her mother in [place] when she goes to [place] for the winter. She states he mother is overly critical of her, always mentioning her weight, etc... This really increases her anxiety.    Today's PHQ-2 Score:   PHQ-2 ( 1999 [employer]) 9/28/2017 9/25/2017   Q1: Little interest or pleasure in doing things 1 1   Q2: Feeling down, depressed or hopeless 1 1   PHQ-2 Score 2 2   Q1: Little interest or pleasure in doing things - Several days   Q2: Feeling down, depressed or hopeless - Several days   PHQ-2 Score - 2       Abuse: Current or Past(Physical, Sexual or Emotional)- No  Do you feel safe in your environment - Yes    Social History   Substance Use Topics     Smoking status: Never Smoker     Smokeless tobacco: Never Used     Alcohol use 0.0 oz/week     0 Standard drinks or equivalent per week      Comment: 1-2 per month      If you drink alcohol do you typically have >3 drinks per day or >7 drinks per week? Yes - AUDIT SCORE:     AUDIT - Alcohol Use Disorders Identification Test - Reproduced from the World Health Organization Audit 2001 (Second Edition) 10/1/2018   1.  How often do you have a drink containing alcohol? 2 to 4 times a month                        Reviewed orders with patient.  Reviewed health maintenance and updated orders  "accordingly - Yes  BP Readings from Last 3 Encounters:   10/01/18 108/79   05/07/18 128/79   09/28/17 120/78    Wt Readings from Last 3 Encounters:   10/01/18 176 lb 14.4 oz (80.2 kg)   05/07/18 172 lb 9.6 oz (78.3 kg)   09/28/17 168 lb (76.2 kg)              Patient over age 50, mutual decision to screen reflected in health maintenance.    Pertinent mammograms are reviewed under the imaging tab.  History of abnormal Pap smear: Last 3 Pap and HPV Results:       Reviewed and updated as needed this visit by clinical staff  Tobacco  Allergies  Meds  Med Hx  Surg Hx  Fam Hx  Soc Hx        Reviewed and updated as needed this visit by Provider        Past Medical History:   Diagnosis Date     Mild intermittent asthma       Past Surgical History:   Procedure Laterality Date     ABDOMEN SURGERY      ? Enteritis as a child, possibly appendix     HYSTERECTOMY TOTAL ABDOMINAL, BILATERAL SALPINGO-OOPHORECTOMY, COMBINED  1998     TONSILLECTOMY         ROS:  CONSTITUTIONAL: NEGATIVE for fever, chills, change in weight  INTEGUMENTARY/SKIN: NEGATIVE for worrisome rashes, moles or lesions  EYES: NEGATIVE for vision changes or irritation  ENT: NEGATIVE for ear, mouth and throat problems  RESP: NEGATIVE for significant cough or SOB  BREAST: NEGATIVE for masses, tenderness or discharge  CV: NEGATIVE for chest pain, palpitations or peripheral edema  GI: NEGATIVE for nausea, abdominal pain, heartburn, or change in bowel habits  : NEGATIVE for unusual urinary or vaginal symptoms. No vaginal bleeding.  MUSCULOSKELETAL: NEGATIVE for significant arthralgias or myalgia  NEURO: NEGATIVE for weakness, dizziness or paresthesias  PSYCHIATRIC: NEGATIVE for changes in mood or affect     OBJECTIVE:   /79  Pulse 80  Temp 98.3  F (36.8  C) (Oral)  Ht 5' 1.75\" (1.568 m)  Wt 176 lb 14.4 oz (80.2 kg)  SpO2 99%  BMI 32.62 kg/m2  EXAM:  GENERAL: healthy, alert and no distress  EYES: Eyes grossly normal to inspection, PERRL and " conjunctivae and sclerae normal  HENT: ear canals and TM's normal, nose and mouth without ulcers or lesions  NECK: no adenopathy and no asymmetry, masses, or scars  RESP: lungs clear to auscultation - no rales, rhonchi or wheezes  CV: regular rate and rhythm, normal S1 S2, no S3 or S4, no murmur, click or rub, no peripheral edema and peripheral pulses strong  ABDOMEN: soft, nontender, no hepatosplenomegaly, no masses and bowel sounds normal  MS: no gross musculoskeletal defects noted, no edema  SKIN: no suspicious lesions or rashes  NEURO: Normal strength and tone, mentation intact and speech normal  PSYCH: mentation appears normal, affect normal/bright    Diagnostic Test Results:  none     ASSESSMENT/PLAN:   1. Routine general medical examination at a health care facility: health maintenance reviewed and updated. Patient declined flu shot.    2. Screen for colon cancer  - GASTROENTEROLOGY ADULT REF PROCEDURE ONLY    3. Screening for HIV (human immunodeficiency virus)  - HIV Screening    4. CARDIOVASCULAR SCREENING; LDL GOAL LESS THAN 160  - Lipid panel reflex to direct LDL Fasting    5. Screening for diabetes mellitus  - Basic metabolic panel    6. Anxiety: with anticipation of increased anxiety with visit to mom, will refill lorazepam. Discussed with Austin that if it seems like she is needing lorazepam more frequently, would recommend increasing Effexor dose.  - LORazepam (ATIVAN) 0.5 MG tablet; TAKE 1/2 TO 1 TABLET BY MOUTH EVERY 8 HOURS AS NEEDED FOR ANXIETY  Dispense: 30 tablet; Refill: 0  - venlafaxine (EFFEXOR-XR) 75 MG 24 hr capsule; Take 1 capsule (75 mg) by mouth daily  Dispense: 90 capsule; Refill: 3    7. Prurigo nodularis  - hydrOXYzine (ATARAX) 25 MG tablet; 1 tab at bedtime  Dispense: 90 tablet; Refill: 3    8. Eczema, unspecified type  - hydrOXYzine (ATARAX) 25 MG tablet; 1 tab at bedtime  Dispense: 90 tablet; Refill: 3    9. Moderate recurrent major depression (H):stable on Venlafaxine.    10.  "Intermittent asthma, uncomplicated: well controlled. Just needs new Rx  - albuterol (PROAIR HFA) 108 (90 Base) MCG/ACT inhaler; Inhale 2 puffs into the lungs every 6 hours as needed for shortness of breath / dyspnea  Dispense: 3 Inhaler; Refill: 1    COUNSELING:   Reviewed preventive health counseling, as reflected in patient instructions       Regular exercise       Healthy diet/nutrition       Colon cancer screening       HIV screeninx in teen years, 1x in adult years, and at intervals if high risk    BP Readings from Last 1 Encounters:   18 128/79     Estimated body mass index is 31.57 kg/(m^2) as calculated from the following:    Height as of 18: 5' 2\" (1.575 m).    Weight as of 18: 172 lb 9.6 oz (78.3 kg).      Weight management plan: Discussed healthy diet and exercise guidelines and patient will follow up in 12 months in clinic to re-evaluate.     reports that she has never smoked. She has never used smokeless tobacco.      Counseling Resources:  ATP IV Guidelines  Pooled Cohorts Equation Calculator  Breast Cancer Risk Calculator  FRAX Risk Assessment  ICSI Preventive Guidelines  Dietary Guidelines for Americans, 2010  USDA's MyPlate  ASA Prophylaxis  Lung CA Screening    Manuel Mancilla, DO  [facility]  "

## 2018-10-01 NOTE — LETTER
My Depression Action Plan  Name: Austin Shaffer   Date of Birth 1955  Date: 10/1/2018    My doctor: Reji Patterson Jr.   My clinic: [facility] [place]  [address]  [place] [address] [phone]          GREEN    ZONE   Good Control    What it looks like:     Things are going generally well. You have normal up s and down s. You may even feel depressed from time to time, but bad moods usually last less than a day.   What you need to do:  1. Continue to care for yourself (see self care plan)  2. Check your depression survival kit and update it as needed  3. Follow your physician s recommendations including any medication.  4. Do not stop taking medication unless you consult with your physician first.           YELLOW         ZONE Getting Worse    What it looks like:     Depression is starting to interfere with your life.     It may be hard to get out of bed; you may be starting to isolate yourself from others.    Symptoms of depression are starting to last most all day and this has happened for several days.     You may have suicidal thoughts but they are not constant.   What you need to do:     1. Call your care team, your response to treatment will improve if you keep your care team informed of your progress. Yellow periods are signs an adjustment may need to be made.     2. Continue your self-care, even if you have to fake it!    3. Talk to someone in your support network    4. Open up your depression survival kit           RED    ZONE Medical Alert - Get Help    What it looks like:     Depression is seriously interfering with your life.     You may experience these or other symptoms: You can t get out of bed most days, can t work or engage in other necessary activities, you have trouble taking care of basic hygiene, or basic responsibilities, thoughts of suicide or death that will not go away, self-injurious behavior.     What you need to do:  1. Call your care team and  request a same-day appointment. If they are not available (weekends or after hours) call your local crisis line, emergency room or 911.            Depression Self Care Plan / Survival Kit    Self-Care for Depression  Here s the deal. Your body and mind are really not as separate as most people think.  What you do and think affects how you feel and how you feel influences what you do and think. This means if you do things that people who feel good do, it will help you feel better.  Sometimes this is all it takes.  There is also a place for medication and therapy depending on how severe your depression is, so be sure to consult with your medical provider and/ or Behavioral Health Consultant if your symptoms are worsening or not improving.     In order to better manage my stress, I will:    Exercise  Get some form of exercise, every day. This will help reduce pain and release endorphins, the  feel good  chemicals in your brain. This is almost as good as taking antidepressants!  This is not the same as joining a gym and then never going! (they count on that by the way ) It can be as simple as just going for a walk or doing some gardening, anything that will get you moving.      Hygiene   Maintain good hygiene (Get out of bed in the morning, Make your bed, Brush your teeth, Take a shower, and Get dressed like you were going to work, even if you are unemployed).  If your clothes don't fit try to get ones that do.    Diet  I will strive to eat foods that are good for me, drink plenty of water, and avoid excessive sugar, caffeine, alcohol, and other mood-altering substances.  Some foods that are helpful in depression are: complex carbohydrates, B vitamins, flaxseed, fish or fish oil, fresh fruits and vegetables.    Psychotherapy  I agree to participate in Individual Therapy (if recommended).    Medication  If prescribed medications, I agree to take them.  Missing doses can result in serious side effects.  I understand that  drinking alcohol, or other illicit drug use, may cause potential side effects.  I will not stop my medication abruptly without first discussing it with my provider.    Staying Connected With Others  I will stay in touch with my friends, family members, and my primary care provider/team.    Use your imagination  Be creative.  We all have a creative side; it doesn t matter if it s oil painting, sand castles, or mud pies! This will also kick up the endorphins.    Witness Beauty  (AKA stop and smell the roses) Take a look outside, even in mid-winter. Notice colors, textures. Watch the squirrels and birds.     Service to others  Be of service to others.  There is always someone else in need.  By helping others we can  get out of ourselves  and remember the really important things.  This also provides opportunities for practicing all the other parts of the program.    Humor  Laugh and be silly!  Adjust your TV habits for less news and crime-drama and more comedy.    Control your stress  Try breathing deep, massage therapy, biofeedback, and meditation. Find time to relax each day.     My support system    Clinic Contact:  Phone number:    Contact 1:  Phone number:    Contact 2:  Phone number:    Mu-ism/:  Phone number:    Therapist:  Phone number:    Local crisis center:    Phone number:    Other community support:  Phone number:

## 2018-10-01 NOTE — MR AVS SNAPSHOT
After Visit Summary   10/1/2018    Austin Shaffer    MRN: [id number]           Patient Information     Date Of Birth          1955        Visit Information        Provider Department      10/1/2018 10:00 AM Manuel Mancilla DO [facility]        Today's Diagnoses     Routine general medical examination at a health care facility    -  1    Screen for colon cancer        Screening for HIV (human immunodeficiency virus)        Need for prophylactic vaccination and inoculation against influenza        CARDIOVASCULAR SCREENING; LDL GOAL LESS THAN 160        Screening for diabetes mellitus        Anxiety        Prurigo nodularis        Eczema, unspecified type        Moderate recurrent major depression (H)        Intermittent asthma, uncomplicated          Care Instructions      Preventive Health Recommendations  Female Ages 50 - 64    Yearly exam: See your health care provider every year in order to  o Review health changes.   o Discuss preventive care.    o Review your medicines if your doctor has prescribed any.      Get a Pap test every three years (unless you have an abnormal result and your provider advises testing more often).    If you get Pap tests with HPV test, you only need to test every 5 years, unless you have an abnormal result.     You do not need a Pap test if your uterus was removed (hysterectomy) and you have not had cancer.    You should be tested each year for STDs (sexually transmitted diseases) if you're at risk.     Have a mammogram every 1 to 2 years.    Have a colonoscopy at age 50, or have a yearly FIT test (stool test). These exams screen for colon cancer.      Have a cholesterol test every 5 years, or more often if advised.    Have a diabetes test (fasting glucose) every three years. If you are at risk for diabetes, you should have this test more often.     If you are at risk for osteoporosis (brittle bone disease), think about having a bone density scan  (DEXA).    Shots: Get a flu shot each year. Get a tetanus shot every 10 years.    Nutrition:     Eat at least 5 servings of fruits and vegetables each day.    Eat whole-grain bread, whole-wheat pasta and brown rice instead of white grains and rice.    Get adequate Calcium and Vitamin D.     Lifestyle    Exercise at least 150 minutes a week (30 minutes a day, 5 days a week). This will help you control your weight and prevent disease.    Limit alcohol to one drink per day.    No smoking.     Wear sunscreen to prevent skin cancer.     See your dentist every six months for an exam and cleaning.    See your eye doctor every 1 to 2 years.            Follow-ups after your visit        Additional Services     GASTROENTEROLOGY ADULT REF PROCEDURE ONLY       Last Lab Result: Creatinine (mg/dL)       Date                     Value                 09/28/2017               0.76             ----------  Body mass index is 32.62 kg/(m^2).     Needed:  No  Language:  English    Patient will be contacted to schedule procedure.     Please be aware that coverage of these services is subject to the terms and limitations of your health insurance plan.  Call member services at your health plan with any benefit or coverage questions.  Any procedures must be performed at a [place] facility OR coordinated by your clinic's referral office.    Please bring the following with you to your appointment:    (1) Any X-Rays, CTs or MRIs which have been performed.  Contact the facility where they were done to arrange for  prior to your scheduled appointment.    (2) List of current medications   (3) This referral request   (4) Any documents/labs given to you for this referral                  Follow-up notes from your care team     Return in about 6 months (around 4/1/2019) for depression/anxiety.      Who to contact     If you have questions or need follow up information about today's clinic visit or your schedule please contact  "[facility] [place] directly at [phone].  Normal or non-critical lab and imaging results will be communicated to you by MyChart, letter or phone within 4 business days after the clinic has received the results. If you do not hear from us within 7 days, please contact the clinic through [employer] or phone. If you have a critical or abnormal lab result, we will notify you by phone as soon as possible.  Submit refill requests through Touch[employer] or call your pharmacy and they will forward the refill request to us. Please allow 3 business days for your refill to be completed.          Additional Information About Your Visit        [employer] Information     Touch[employer] gives you secure access to your electronic health record. If you see a primary care provider, you can also send messages to your care team and make appointments. If you have questions, please call your primary care clinic.  If you do not have a primary care provider, please call [phone] and they will assist you.        Care EveryWhere ID     This is your Care EveryWhere ID. This could be used by other organizations to access your [place] medical records  [id number]        Your Vitals Were     Pulse Temperature Height Pulse Oximetry BMI (Body Mass Index)       80 98.3  F (36.8  C) (Oral) 5' 1.75\" (1.568 m) 99% 32.62 kg/m2        Blood Pressure from Last 3 Encounters:   10/01/18 108/79   05/07/18 128/79   09/28/17 120/78    Weight from Last 3 Encounters:   10/01/18 176 lb 14.4 oz (80.2 kg)   05/07/18 172 lb 9.6 oz (78.3 kg)   09/28/17 168 lb (76.2 kg)              We Performed the Following     Asthma Action Plan (AAP)     Basic metabolic panel     DEPRESSION ACTION PLAN (DAP)     GASTROENTEROLOGY ADULT REF PROCEDURE ONLY     HIV Screening     Lipid panel reflex to direct LDL Fasting          Where to get your medicines      [id number] [place] - [address]  [address] [place] " [address]     Phone:  [phone]     albuterol 108 (90 Base) MCG/ACT inhaler    hydrOXYzine 25 MG tablet    venlafaxine 75 MG 24 hr capsule         Some of these will need a paper prescription and others can be bought over the counter.  Ask your nurse if you have questions.     Bring a paper prescription for each of these medications     LORazepam 0.5 MG tablet          Primary Care Provider Office Phone # Fax #    Reji Patterson Jr., [phone] [address]  [place] [address]        Equal Access to Services     [facility]: Hadii aad ku hadasho Soomaali, waaxda luqadaha, qaybta kaalmada adeegyada, waxstephanie michele . So [facility] [phone].    ATENCIÓN: Si habla español, tiene a baez disposición servicios gratuitos de asistencia lingüística. [facility] [phone].    We comply with applicable federal civil rights laws and [place] laws. We do not discriminate on the basis of race, color, national origin, age, disability, sex, sexual orientation, or gender identity.            Thank you!     Thank you for choosing [facility]  for your care. Our goal is always to provide you with excellent care. Hearing back from our patients is one way we can continue to improve our services. Please take a few minutes to complete the written survey that you may receive in the mail after your visit with us. Thank you!             Your Updated Medication List - Protect others around you: Learn how to safely use, store and throw away your medicines at www.disposemymeds.org.          This list is accurate as of 10/1/18 10:41 AM.  Always use your most recent med list.                   Brand Name Dispense Instructions for use Diagnosis    albuterol 108 (90 Base) MCG/ACT inhaler    PROAIR HFA    3 Inhaler    Inhale 2 puffs into the lungs every 6 hours as needed for shortness of breath / dyspnea    Intermittent asthma, uncomplicated       * betamethasone dipropionate 0.05 %  cream    DIPROSONE    45 g    Apply to trunk, arms and legs bid for 10-14 consecutive days, Not to be used on face or groin    Prurigo nodularis, Eczema       * betamethasone dipropionate 0.05 % lotion    DIPROSONE    60 mL    Apply to scalp at bedtime when needed    Prurigo nodularis       hydrOXYzine 25 MG tablet    ATARAX    90 tablet    1 tab at bedtime    Prurigo nodularis, Eczema, unspecified type       LORazepam 0.5 MG tablet    ATIVAN    30 tablet    TAKE 1/2 TO 1 TABLET BY MOUTH EVERY 8 HOURS AS NEEDED FOR ANXIETY    Anxiety       order for DME     1 Device    Equipment being ordered: walking boot.    Toe pain       venlafaxine 75 MG 24 hr capsule    EFFEXOR-XR    90 capsule    Take 1 capsule (75 mg) by mouth daily    Anxiety       * Notice:  This list has 2 medication(s) that are the same as other medications prescribed for you. Read the directions carefully, and ask your doctor or other care provider to review them with you.

## 2018-10-02 LAB — HIV 1+2 AB+HIV1 P24 AG SERPL QL IA: NONREACTIVE

## 2018-10-02 RX ORDER — VENLAFAXINE HYDROCHLORIDE 75 MG/1
CAPSULE, EXTENDED RELEASE ORAL
Qty: 90 CAPSULE | Refills: 0 | OUTPATIENT
Start: 2018-10-02

## 2018-10-02 ASSESSMENT — ASTHMA QUESTIONNAIRES: ACT_TOTALSCORE: 21

## 2018-10-02 ASSESSMENT — PATIENT HEALTH QUESTIONNAIRE - PHQ9: SUM OF ALL RESPONSES TO PHQ QUESTIONS 1-9: 5

## 2018-10-02 ASSESSMENT — ANXIETY QUESTIONNAIRES: GAD7 TOTAL SCORE: 5

## 2018-10-09 ENCOUNTER — MYC REFILL (OUTPATIENT)
Dept: FAMILY MEDICINE | Facility: CLINIC | Age: 63
End: 2018-10-09

## 2018-10-09 DIAGNOSIS — J45.20 INTERMITTENT ASTHMA, UNCOMPLICATED: ICD-10-CM

## 2018-10-09 RX ORDER — ALBUTEROL SULFATE 90 UG/1
2 AEROSOL, METERED RESPIRATORY (INHALATION) EVERY 6 HOURS PRN
Qty: 3 INHALER | Refills: 1 | Status: CANCELLED | OUTPATIENT
Start: 2018-10-09

## 2018-10-09 NOTE — TELEPHONE ENCOUNTER
Message from [employer]:  Original authorizing provider: DO Austin Posadas would like a refill of the following medications:  albuterol (PROAIR HFA) 108 (90 Base) MCG/ACT inhaler [Manuel Mancilla DO]    Preferred pharmacy: [facility] [address] [place] [address] [address]    Comment:

## 2018-10-09 NOTE — TELEPHONE ENCOUNTER
"Requested Prescriptions   Pending Prescriptions Disp Refills     albuterol (PROAIR HFA) 108 (90 Base) MCG/ACT inhaler  Last Written Prescription Date:  10/1/2018  Last Fill Quantity: 3 Inhaler,  # refills: 1   Last office visit: 10/1/2018 with prescribing provider:  Charo   Future Office Visit:       3 Inhaler 1     Sig: Inhale 2 puffs into the lungs every 6 hours as needed for shortness of breath / dyspnea    Asthma Maintenance Inhalers - Anticholinergics Passed    10/9/2018 12:11 PM       Passed - Patient is age 12 years or older       Passed - Asthma control assessment score within normal limits in last 6 months    Please review ACT score.     ACT Total Scores 9/28/2017 5/7/2018 10/1/2018   ACT TOTAL SCORE - - -   ASTHMA ER VISITS - - -   ASTHMA HOSPITALIZATIONS - - -   ACT TOTAL SCORE (Goal Greater than or Equal to 20) 21 22 21   In the past 12 months, how many times did you visit the emergency room for your asthma without being admitted to the hospital? 0 0 0   In the past 12 months, how many times were you hospitalized overnight because of your asthma? 0 0 0              Passed - Recent (6 mo) or future (30 days) visit within the authorizing provider's specialty    Patient had office visit in the last 6 months or has a visit in the next 30 days with authorizing provider or within the authorizing provider's specialty.  See \"Patient Info\" tab in inbasket, or \"Choose Columns\" in Meds & Orders section of the refill encounter.              "

## 2018-10-15 ENCOUNTER — OFFICE VISIT (OUTPATIENT)
Dept: FAMILY MEDICINE | Facility: CLINIC | Age: 63
End: 2018-10-15
Payer: COMMERCIAL

## 2018-10-15 VITALS
WEIGHT: 174 LBS | HEART RATE: 106 BPM | DIASTOLIC BLOOD PRESSURE: 68 MMHG | SYSTOLIC BLOOD PRESSURE: 114 MMHG | BODY MASS INDEX: 32.08 KG/M2 | OXYGEN SATURATION: 96 % | TEMPERATURE: 97.8 F

## 2018-10-15 DIAGNOSIS — R10.9 FLANK PAIN: Primary | ICD-10-CM

## 2018-10-15 DIAGNOSIS — N30.01 ACUTE CYSTITIS WITH HEMATURIA: ICD-10-CM

## 2018-10-15 DIAGNOSIS — R82.90 NONSPECIFIC FINDING ON EXAMINATION OF URINE: ICD-10-CM

## 2018-10-15 LAB
ALBUMIN UR-MCNC: 100 MG/DL
APPEARANCE UR: ABNORMAL
BACTERIA #/AREA URNS HPF: ABNORMAL /HPF
BILIRUB UR QL STRIP: NEGATIVE
COLOR UR AUTO: ABNORMAL
GLUCOSE UR STRIP-MCNC: NEGATIVE MG/DL
HGB UR QL STRIP: ABNORMAL
KETONES UR STRIP-MCNC: 80 MG/DL
LEUKOCYTE ESTERASE UR QL STRIP: ABNORMAL
NITRATE UR QL: POSITIVE
NON-SQ EPI CELLS #/AREA URNS LPF: ABNORMAL /LPF
PH UR STRIP: 5.5 PH (ref 5–7)
RBC #/AREA URNS AUTO: ABNORMAL /HPF
SOURCE: ABNORMAL
SP GR UR STRIP: 1.02 (ref 1–1.03)
UROBILINOGEN UR STRIP-ACNC: 1 EU/DL (ref 0.2–1)
WBC #/AREA URNS AUTO: ABNORMAL /HPF

## 2018-10-15 PROCEDURE — 99213 OFFICE O/P EST LOW 20 MIN: CPT | Performed by: FAMILY MEDICINE

## 2018-10-15 PROCEDURE — 87088 URINE BACTERIA CULTURE: CPT | Performed by: FAMILY MEDICINE

## 2018-10-15 PROCEDURE — 81001 URINALYSIS AUTO W/SCOPE: CPT | Performed by: FAMILY MEDICINE

## 2018-10-15 PROCEDURE — 87186 SC STD MICRODIL/AGAR DIL: CPT | Performed by: FAMILY MEDICINE

## 2018-10-15 PROCEDURE — 87086 URINE CULTURE/COLONY COUNT: CPT | Performed by: FAMILY MEDICINE

## 2018-10-15 RX ORDER — LEVOFLOXACIN 750 MG/1
750 TABLET, FILM COATED ORAL DAILY
Qty: 7 TABLET | Refills: 0 | Status: SHIPPED | OUTPATIENT
Start: 2018-10-15 | End: 2019-07-02

## 2018-10-15 NOTE — PROGRESS NOTES
SUBJECTIVE:   Austin Shaffer is a 62 year old female who presents to clinic today for the following health issues:      URINARY TRACT SYMPTOMS  Onset: 5 days    Description:   Painful urination (Dysuria): no   Blood in urine (Hematuria): YES  Delay in urine (Hesitency): no     Intensity: moderate, severe    Progression of Symptoms:  worsening    Accompanying Signs & Symptoms:  Fever/chills: YES - high as 103 deg F  Flank pain YES- right  Nausea and vomiting: YES- nausea  Any vaginal symptoms: none  Abdominal/Pelvic Pain: no     History:   History of frequent UTI's: no   History of kidney stones: YES  Sexually Active: YES  Possibility of pregnancy: No    Precipitating factors:   none    Therapies Tried and outcome: ibuprofen      On Wednesday noticed her back was bothering her. On Thursday, felt like pain was radiating and going into her bladder. Noticed blood in urine. Hematuria resolved but pain still present.       Problem list and histories reviewed & adjusted, as indicated.  Additional history: as documented    Patient Active Problem List   Diagnosis     Moderate recurrent major depression (H)     Intermittent asthma     CARDIOVASCULAR SCREENING; LDL GOAL LESS THAN 160     Advanced directives, counseling/discussion     Anxiety     Vitamin D deficiency     Past Surgical History:   Procedure Laterality Date     ABDOMEN SURGERY      ? Enteritis as a child, possibly appendix     HYSTERECTOMY TOTAL ABDOMINAL, BILATERAL SALPINGO-OOPHORECTOMY, COMBINED  1998     TONSILLECTOMY         Social History   Substance Use Topics     Smoking status: Never Smoker     Smokeless tobacco: Never Used     Alcohol use 0.0 oz/week     0 Standard drinks or equivalent per week      Comment: 1-2 per month      Family History   Problem Relation Age of Onset     Cardiovascular Mother      Hypertension Mother      Cardiovascular Maternal Grandmother      Cancer Father      Kidney     Myocardial Infarction Brother 60     2017      Cerebrovascular Disease Brother 63     2017     Diabetes Brother      Myocardial Infarction Brother      Hypertension Brother            Reviewed and updated as needed this visit by clinical staff  Tobacco  Allergies  Meds  Problems       Reviewed and updated as needed this visit by Provider  Allergies  Meds  Problems         ROS:  Constitutional, HEENT, cardiovascular, pulmonary, gi and gu systems are negative, except as otherwise noted.    OBJECTIVE:     /68  Pulse 106  Temp 97.8  F (36.6  C) (Oral)  Wt 174 lb (78.9 kg)  SpO2 96%  BMI 32.08 kg/m2  Body mass index is 32.08 kg/(m^2).  GENERAL: healthy, alert and no distress  RESP: lungs clear to auscultation - no rales, rhonchi or wheezes  CV: regular rate and rhythm, normal S1 S2, no S3 or S4, no murmur, click or rub, no peripheral edema and peripheral pulses strong  ABDOMEN: soft, nontender  MS: right sided flank pain on palpation.    Diagnostic Test Results:  Urinalysis - + nitrites,  WBC, 2-5 RBC    ASSESSMENT/PLAN:   1. Flank pain  - *UA reflex to Microscopic and Culture ([place] and [facility] (except [place] and [place])  - Urine Microscopic    2. Acute cystitis with hematuria: UA positive for nitrites, WBCs. With report of fever and flank pain will treat with 1 week course of Levaquin. Continue to push fluids. If not improving over the next 2-3 days, recommend follow up for further evaluation to ensure IV antibiotics are not needed. If feeling worse, advised being seen in ED.  - levofloxacin (LEVAQUIN) 750 MG tablet; Take 1 tablet (750 mg) by mouth daily  Dispense: 7 tablet; Refill: 0    3. Nonspecific finding on examination of urine  - Urine Culture Aerobic Bacterial      Manuel Mancilla,   [facility]

## 2018-10-15 NOTE — MR AVS SNAPSHOT
After Visit Summary   10/15/2018    Austin Shaffer    MRN: [id number]           Patient Information     Date Of Birth          1955        Visit Information        Provider Department      10/15/2018 1:40 PM [facility]illa,  [facility]        Today's Diagnoses     Flank pain    -  1    Acute cystitis with hematuria           Follow-ups after your visit        Follow-up notes from your care team     Return in about 4 days (around 10/19/2018) for follow up if symptoms aren't improving. .      Who to contact     If you have questions or need follow up information about today's clinic visit or your schedule please contact [facility] at [phone].  Normal or non-critical lab and imaging results will be communicated to you by MyChart, letter or phone within 4 business days after the clinic has received the results. If you do not hear from us within 7 days, please contact the clinic through [employer] or phone. If you have a critical or abnormal lab result, we will notify you by phone as soon as possible.  Submit refill requests through [employer] or call your pharmacy and they will forward the refill request to us. Please allow 3 business days for your refill to be completed.          Additional Information About Your Visit        MyChart Information     [employer] gives you secure access to your electronic health record. If you see a primary care provider, you can also send messages to your care team and make appointments. If you have questions, please call your primary care clinic.  If you do not have a primary care provider, please call [phone] and they will assist you.        Care EveryWhere ID     This is your Care EveryWhere ID. This could be used by other organizations to access your [place] medical records  [id number]        Your Vitals Were     Pulse Temperature Pulse Oximetry BMI (Body Mass Index)          106 97.8  F (36.6  C) (Oral) 96% 32.08  kg/m2         Blood Pressure from Last 3 Encounters:   10/15/18 114/68   10/01/18 108/79   05/07/18 128/79    Weight from Last 3 Encounters:   10/15/18 174 lb (78.9 kg)   10/01/18 176 lb 14.4 oz (80.2 kg)   05/07/18 172 lb 9.6 oz (78.3 kg)              We Performed the Following     *UA reflex to Microscopic and Culture ([place] and [facility] (except [place] and [place])     Urine Microscopic          Today's Medication Changes          These changes are accurate as of 10/15/18  2:11 PM.  If you have any questions, ask your nurse or doctor.               Start taking these medicines.        Dose/Directions    levofloxacin 750 MG tablet   Commonly known as:  LEVAQUIN   Used for:  Acute cystitis with hematuria   Started by:  Manuel Mancilla DO        Dose:  750 mg   Take 1 tablet (750 mg) by mouth daily   Quantity:  7 tablet   Refills:  0            Where to get your medicines      These medications [facility] [address] [place] [place] [address] [address]  [address] [place] [address]     Phone:  [phone]     levofloxacin 750 MG tablet                Primary Care Provider Office Phone # Fax #    Reji Patterson Jr., [phone] [address]  [place] [address]        Equal Access to Services     [facility]: Hadii francoise ku hadasho Soomaali, waaxda luqadaha, qaybta kaalmada adeegyada, juliette saravia. So [facility] [phone].    ATENCIÓN: Si habla español, tiene a baez disposición servicios gratuitos de asistencia lingüística. Ethel al [phone].    We comply with applicable federal civil rights laws and [place] laws. We do not discriminate on the basis of race, color, national origin, age, disability, sex, sexual orientation, or gender identity.            Thank you!     Thank you for choosing [facility]  for your care. Our goal is always to provide you with excellent care. Hearing back from our patients is  one way we can continue to improve our services. Please take a few minutes to complete the written survey that you may receive in the mail after your visit with us. Thank you!             Your Updated Medication List - Protect others around you: Learn how to safely use, store and throw away your medicines at www.disposemymeds.org.          This list is accurate as of 10/15/18  2:11 PM.  Always use your most recent med list.                   Brand Name Dispense Instructions for use Diagnosis    albuterol 108 (90 Base) MCG/ACT inhaler    PROAIR HFA    3 Inhaler    Inhale 2 puffs into the lungs every 6 hours as needed for shortness of breath / dyspnea    Intermittent asthma, uncomplicated       * betamethasone dipropionate 0.05 % cream    DIPROSONE    45 g    Apply to trunk, arms and legs bid for 10-14 consecutive days, Not to be used on face or groin    Prurigo nodularis, Eczema       * betamethasone dipropionate 0.05 % lotion    DIPROSONE    60 mL    Apply to scalp at bedtime when needed    Prurigo nodularis       hydrOXYzine 25 MG tablet    ATARAX    90 tablet    1 tab at bedtime    Prurigo nodularis, Eczema, unspecified type       levofloxacin 750 MG tablet    LEVAQUIN    7 tablet    Take 1 tablet (750 mg) by mouth daily    Acute cystitis with hematuria       LORazepam 0.5 MG tablet    ATIVAN    30 tablet    TAKE 1/2 TO 1 TABLET BY MOUTH EVERY 8 HOURS AS NEEDED FOR ANXIETY    Anxiety       venlafaxine 75 MG 24 hr capsule    EFFEXOR-XR    90 capsule    Take 1 capsule (75 mg) by mouth daily    Anxiety       * Notice:  This list has 2 medication(s) that are the same as other medications prescribed for you. Read the directions carefully, and ask your doctor or other care provider to review them with you.

## 2018-10-16 ENCOUNTER — MYC MEDICAL ADVICE (OUTPATIENT)
Dept: FAMILY MEDICINE | Facility: CLINIC | Age: 63
End: 2018-10-16

## 2018-10-16 DIAGNOSIS — R10.9 FLANK PAIN: Primary | ICD-10-CM

## 2018-10-16 RX ORDER — OXYCODONE AND ACETAMINOPHEN 5; 325 MG/1; MG/1
1 TABLET ORAL EVERY 6 HOURS PRN
Qty: 12 TABLET | Refills: 0 | Status: SHIPPED | OUTPATIENT
Start: 2018-10-16 | End: 2018-10-16

## 2018-10-16 RX ORDER — TRAMADOL HYDROCHLORIDE 50 MG/1
50 TABLET ORAL EVERY 6 HOURS PRN
Qty: 10 TABLET | Refills: 0 | Status: SHIPPED | OUTPATIENT
Start: 2018-10-16 | End: 2019-07-02

## 2018-10-16 NOTE — TELEPHONE ENCOUNTER
Please see Beezag message. Please advise. Thank you.  Luisa Sim RN, BSN  [facility]

## 2018-10-16 NOTE — TELEPHONE ENCOUNTER
Will change Rx to Tramdol as this should not have any cross reactivity to morphine like oxycodone. She should still monitor for any potential adverse effects or reaction and if she experiences anything, proceed to ED for immediate evaluation.

## 2018-10-16 NOTE — TELEPHONE ENCOUNTER
MyChart response sent to patient. Awaiting response.  Luisa Sim, RN, BSN  [facility]

## 2018-10-16 NOTE — TELEPHONE ENCOUNTER
Okay for Rx for pain medications. Encourage using acetaminophen/ibuprofen first and then if needed, can use Percocet. Percocet does have oxycodone and she has morphine listed as an allergy. Can we find out her reaction to morphine?    If taking Percocet and acetaminophen, should not exceed 3g acetaminophen in a day. She should also NOT use concomitantly with lorazepam as this can cause respiratory suppression.     If still having significant pain by Thursday or Friday, She should let us know as I would likely order imaging to look for possible stone or other abnormality.     Manuel Mancilla,   10/16/2018 1:11 PM

## 2018-10-16 NOTE — TELEPHONE ENCOUNTER
Brandy message sent to patient.  Luisa Sim, RN, BSN  [facility]

## 2018-10-16 NOTE — TELEPHONE ENCOUNTER
Please see MyChart response from patient regarding reaction to Morphine. Please advise. Thank you.  Luisa Sim RN, BSN  [facility]

## 2018-10-18 LAB
BACTERIA SPEC CULT: ABNORMAL
BACTERIA SPEC CULT: ABNORMAL
SPECIMEN SOURCE: ABNORMAL

## 2019-05-03 ENCOUNTER — MYC REFILL (OUTPATIENT)
Dept: FAMILY MEDICINE | Facility: CLINIC | Age: 64
End: 2019-05-03

## 2019-05-03 DIAGNOSIS — L30.9 ECZEMA: ICD-10-CM

## 2019-05-03 DIAGNOSIS — L28.1 PRURIGO NODULARIS: ICD-10-CM

## 2019-05-06 RX ORDER — BETAMETHASONE DIPROPIONATE 0.5 MG/G
CREAM TOPICAL
Qty: 45 G | Refills: 1 | Status: SHIPPED | OUTPATIENT
Start: 2019-05-06 | End: 2021-07-06

## 2019-05-06 NOTE — TELEPHONE ENCOUNTER
Requested Prescriptions   Pending Prescriptions Disp Refills     betamethasone dipropionate (DIPROSONE) 0.05 % external cream 45 g 1     Sig: Apply to trunk, arms and legs bid for 10-14 consecutive days, Not to be used on face or groin   Last Written Prescription Date:  6/19/15  Last Fill Quantity: 45g,  # refills: 1   Last office visit: 10/15/2018 with prescribing provider:  Charo   Future Office Visit:        There is no refill protocol information for this order        Routing refill request to provider for review/approval because:  Drug not on the [facility] refill protocol   A break in medication  IVY Hanley, RN  [facility]

## 2019-05-13 DIAGNOSIS — F41.9 ANXIETY: ICD-10-CM

## 2019-05-14 RX ORDER — LORAZEPAM 0.5 MG/1
TABLET ORAL
Qty: 30 TABLET | Refills: 0 | Status: SHIPPED | OUTPATIENT
Start: 2019-05-14 | End: 2019-07-02

## 2019-05-26 ENCOUNTER — OFFICE VISIT (OUTPATIENT)
Dept: URGENT CARE | Facility: URGENT CARE | Age: 64
End: 2019-05-26
Payer: COMMERCIAL

## 2019-05-26 VITALS
OXYGEN SATURATION: 96 % | TEMPERATURE: 98.2 F | DIASTOLIC BLOOD PRESSURE: 83 MMHG | SYSTOLIC BLOOD PRESSURE: 120 MMHG | HEART RATE: 72 BPM

## 2019-05-26 DIAGNOSIS — Z87.442 H/O RENAL CALCULI: ICD-10-CM

## 2019-05-26 DIAGNOSIS — N39.0 URINARY TRACT INFECTION WITHOUT HEMATURIA, SITE UNSPECIFIED: Primary | ICD-10-CM

## 2019-05-26 LAB
ALBUMIN UR-MCNC: ABNORMAL MG/DL
APPEARANCE UR: ABNORMAL
BACTERIA #/AREA URNS HPF: ABNORMAL /HPF
BILIRUB UR QL STRIP: NEGATIVE
COLOR UR AUTO: YELLOW
GLUCOSE UR STRIP-MCNC: NEGATIVE MG/DL
HGB UR QL STRIP: ABNORMAL
KETONES UR STRIP-MCNC: NEGATIVE MG/DL
LEUKOCYTE ESTERASE UR QL STRIP: ABNORMAL
NITRATE UR QL: NEGATIVE
NON-SQ EPI CELLS #/AREA URNS LPF: ABNORMAL /LPF
PH UR STRIP: 5.5 PH (ref 5–7)
RBC #/AREA URNS AUTO: ABNORMAL /HPF
SOURCE: ABNORMAL
SP GR UR STRIP: 1.02 (ref 1–1.03)
TRANS CELLS #/AREA URNS HPF: ABNORMAL /HPF
UROBILINOGEN UR STRIP-ACNC: 0.2 EU/DL (ref 0.2–1)
WBC #/AREA URNS AUTO: ABNORMAL /HPF

## 2019-05-26 PROCEDURE — 99214 OFFICE O/P EST MOD 30 MIN: CPT | Performed by: FAMILY MEDICINE

## 2019-05-26 PROCEDURE — 87086 URINE CULTURE/COLONY COUNT: CPT | Performed by: FAMILY MEDICINE

## 2019-05-26 PROCEDURE — 81001 URINALYSIS AUTO W/SCOPE: CPT | Performed by: FAMILY MEDICINE

## 2019-05-26 RX ORDER — PHENAZOPYRIDINE HYDROCHLORIDE 200 MG/1
200 TABLET, FILM COATED ORAL 3 TIMES DAILY PRN
Qty: 12 TABLET | Refills: 0 | Status: SHIPPED | OUTPATIENT
Start: 2019-05-26 | End: 2019-05-26

## 2019-05-26 RX ORDER — LEVOFLOXACIN 750 MG/1
750 TABLET, FILM COATED ORAL DAILY
Qty: 7 TABLET | Refills: 0 | Status: SHIPPED | OUTPATIENT
Start: 2019-05-26 | End: 2019-10-03

## 2019-05-26 RX ORDER — PHENAZOPYRIDINE HYDROCHLORIDE 200 MG/1
200 TABLET, FILM COATED ORAL 3 TIMES DAILY PRN
Qty: 12 TABLET | Refills: 0 | Status: SHIPPED | OUTPATIENT
Start: 2019-05-26 | End: 2019-05-30

## 2019-05-26 RX ORDER — LEVOFLOXACIN 750 MG/1
750 TABLET, FILM COATED ORAL DAILY
Qty: 7 TABLET | Refills: 0 | Status: SHIPPED | OUTPATIENT
Start: 2019-05-26 | End: 2019-05-26

## 2019-05-26 NOTE — PROGRESS NOTES
"Subjective     Chief Complaint   Patient presents with     Urgent Care     Urinary Problem     Possible UTI x4 days- dysuria, frequency, urgency, dull low back pain. Hx of kidney infection     Austin Shaffer is a 63 year old female who presents to clinic today for the following health issues:    HPI   URINARY TRACT SYMPTOMS      Duration: 4 days     Description  dysuria, frequency, urgency, hematuria,  No current or past  retention, incontinence and  H/o kidney stone 6 months ago    Intensity:  moderate, severe    Accompanying signs and symptoms:  Fever/chills: YES- chills/fever  Flank pain YES  Nausea and vomiting: YES- nausea, no eating drinking fluids  Vaginal symptoms: none  Abdominal/Pelvic Pain: YES- lower abdominal pain, not similar to kidney stone pain in past   She had \"nephritis\" as a child no issues with infections/kidneys after completed treatment as a child    History  History of frequent UTI's: no   History of kidney stones: YES  Sexually Active: YES  Possibility of pregnancy: No    Precipitating or alleviating factors: None/ recent intercourse     Therapies tried and outcome: ibuprofen and cranberry capsules   Outcome: no better, did not take anything today      Patient Active Problem List   Diagnosis     Moderate recurrent major depression (H)     Intermittent asthma     CARDIOVASCULAR SCREENING; LDL GOAL LESS THAN 160     Advanced directives, counseling/discussion     Anxiety     Vitamin D deficiency     Past Surgical History:   Procedure Laterality Date     ABDOMEN SURGERY      ? Enteritis as a child, possibly appendix     HYSTERECTOMY TOTAL ABDOMINAL, BILATERAL SALPINGO-OOPHORECTOMY, COMBINED  1998     TONSILLECTOMY         Social History     Tobacco Use     Smoking status: Never Smoker     Smokeless tobacco: Never Used   Substance Use Topics     Alcohol use: Yes     Alcohol/week: 0.0 oz     Comment: 1-2 per month      Family History   Problem Relation Age of Onset     Cardiovascular Mother  "     Hypertension Mother      Cardiovascular Maternal Grandmother      Cancer Father         Kidney     Myocardial Infarction Brother 60        2017     Cerebrovascular Disease Brother 63        2017     Diabetes Brother      Myocardial Infarction Brother      Hypertension Brother          Current Outpatient Medications   Medication Sig Dispense Refill     albuterol (PROAIR HFA) 108 (90 Base) MCG/ACT inhaler Inhale 2 puffs into the lungs every 6 hours as needed for shortness of breath / dyspnea 3 Inhaler 1     betamethasone dipropionate (DIPROSONE) 0.05 % external cream Apply to trunk, arms and legs bid for 10-14 consecutive days, Not to be used on face or groin 45 g 1     hydrOXYzine (ATARAX) 25 MG tablet 1 tab at bedtime 90 tablet 3     levofloxacin (LEVAQUIN) 750 MG tablet Take 1 tablet (750 mg) by mouth daily for 7 days 7 tablet 0     LORazepam (ATIVAN) 0.5 MG tablet TAKE 1/2 TO 1 (ONE-HALF TO ONE) TABLET BY MOUTH EVERY 8 HOURS AS NEEDED FOR  ANXIETY. 30 tablet 0     phenazopyridine (PYRIDIUM) 200 MG tablet Take 1 tablet (200 mg) by mouth 3 times daily as needed for irritation 12 tablet 0     venlafaxine (EFFEXOR-XR) 75 MG 24 hr capsule Take 1 capsule (75 mg) by mouth daily 90 capsule 3     betamethasone dipropionate (DIPROSONE) 0.05 % lotion Apply to scalp at bedtime when needed 60 mL 1     levofloxacin (LEVAQUIN) 750 MG tablet Take 1 tablet (750 mg) by mouth daily (Patient not taking: Reported on 5/26/2019) 7 tablet 0     traMADol (ULTRAM) 50 MG tablet Take 1 tablet (50 mg) by mouth every 6 hours as needed for severe pain (Patient not taking: Reported on 5/26/2019) 10 tablet 0     Allergies   Allergen Reactions     Sulfa Drugs      Morphine Difficulty breathing     Recent Labs   Lab Test 10/01/18  1042 09/28/17  1437 09/15/16  0905  04/08/15  1535 05/05/14  0839   * 151* 179*   < >  --  180*   HDL 59 74 61   < >  --  59   TRIG 103 59 91   < >  --  88   ALT  --  26  --   --  26 22   CR 0.78 0.76 0.74  --   0.77 0.78   GFRESTIMATED 74 77 80  --  76 75   GFRESTBLACK 89 >90 >90   GFR Calc    --  >90   GFR Calc   >90   POTASSIUM 4.5 5.0 3.9  --  4.4 3.9   TSH  --  1.21 2.13   < > 1.41 2.04    < > = values in this interval not displayed.      BP Readings from Last 3 Encounters:   05/26/19 120/83   10/15/18 114/68   10/01/18 108/79    Wt Readings from Last 3 Encounters:   10/15/18 78.9 kg (174 lb)   10/01/18 80.2 kg (176 lb 14.4 oz)   05/07/18 78.3 kg (172 lb 9.6 oz)                      Review of Systems   ROS COMP: Constitutional, HEENT, cardiovascular, pulmonary, gi and gu systems are negative, except as otherwise noted.      Objective    /83 (BP Location: Right arm, Patient Position: Chair, Cuff Size: Adult Regular)   Pulse 72   Temp 98.2  F (36.8  C) (Oral)   SpO2 96%   There is no height or weight on file to calculate BMI.  Physical Exam   GENERAL: healthy, alert and no distress  RESP: lungs clear to auscultation - no rales, rhonchi or wheezes  CV: regular rate and rhythm, normal S1 S2, no S3 or S4, no murmur, click or rub, no peripheral edema and peripheral pulses strong  ABDOMEN: soft, suprapubic tenderness, no CVAT bilateral , no masses and bowel sounds normal  MS: no gross musculoskeletal defects noted, no edema  NEURO: Normal strength and tone, mentation intact and speech normal    Diagnostic Test Results:  Results for orders placed or performed in visit on 05/26/19 (from the past 24 hour(s))   UA reflex to Microscopic and Culture   Result Value Ref Range    Color Urine Yellow     Appearance Urine Slightly Cloudy     Glucose Urine Negative NEG^Negative mg/dL    Bilirubin Urine Negative NEG^Negative    Ketones Urine Negative NEG^Negative mg/dL    Specific Gravity Urine 1.020 1.003 - 1.035    Blood Urine Trace (A) NEG^Negative    pH Urine 5.5 5.0 - 7.0 pH    Protein Albumin Urine Trace (A) NEG^Negative mg/dL    Urobilinogen Urine 0.2 0.2 - 1.0 EU/dL    Nitrite Urine Negative  NEG^Negative    Leukocyte Esterase Urine Large (A) NEG^Negative    Source Midstream Urine    Urine Microscopic   Result Value Ref Range    WBC Urine 25-50 (A) OTO5^0 - 5 /HPF    RBC Urine 2-5 (A) OTO2^O - 2 /HPF    Squamous Epithelial /LPF Urine Many (A) FEW^Few /LPF    Transitional Epi Few FEW^Few /HPF    Bacteria Urine Moderate (A) NEG^Negative /HPF           Assessment & Plan       ICD-10-CM    1. Urinary tract infection without hematuria, site unspecified N39.0 UA reflex to Microscopic and Culture     Urine Microscopic     Urine Culture Aerobic Bacterial     levofloxacin (LEVAQUIN) 750 MG tablet     phenazopyridine (PYRIDIUM) 200 MG tablet     DISCONTINUED: levofloxacin (LEVAQUIN) 750 MG tablet     DISCONTINUED: phenazopyridine (PYRIDIUM) 200 MG tablet   2. H/O renal calculi Z87.442 levofloxacin (LEVAQUIN) 750 MG tablet     DISCONTINUED: levofloxacin (LEVAQUIN) 750 MG tablet        ASSESSMENT/PLAN:      ICD-10-CM    1. Urinary tract infection without hematuria, site unspecified N39.0 UA reflex to Microscopic and Culture     Urine Microscopic     Urine Culture Aerobic Bacterial     levofloxacin (LEVAQUIN) 750 MG tablet     phenazopyridine (PYRIDIUM) 200 MG tablet     DISCONTINUED: levofloxacin (LEVAQUIN) 750 MG tablet     DISCONTINUED: phenazopyridine (PYRIDIUM) 200 MG tablet   2. H/O renal calculi Z87.442 levofloxacin (LEVAQUIN) 750 MG tablet     DISCONTINUED: levofloxacin (LEVAQUIN) 750 MG tablet     Will treat uti with levaquin due to h/o kidney stones 6 month ago, -10/16/2018 and treated with levaquin as well. Patient to Er as instructed.        Patient Instructions     If fever, pain is worse, start to vomit to ER     Start levaquin first dose today     Pyridium for pain will turn urine orange        Patient Education     Urinary Tract Infections in Women    Urinary tract infections (UTIs) are most often caused by bacteria. These bacteria enter the urinary tract. The bacteria may come from outside the body.  Or they may travel from the skin outside the rectum or vagina into the urethra. Female anatomy makes it easy for bacteria from the bowel to enter a woman s urinary tract, which is the most common source of UTI. This means women develop UTIs more often than men. Pain in or around the urinary tract is a common UTI symptom. But the only way to know for sure if you have a UTI for the healthcare provider to test your urine. The two tests that may be done are the urinalysis and urine culture.  Types of UTIs    Cystitis. A bladder infection (cystitis) is the most common UTI in women. You may have urgent or frequent urination. You may also have pain, burning when you urinate, and bloody urine.    Urethritis. This is an inflamed urethra, which is the tube that carries urine from the bladder to outside the body. You may have lower stomach or back pain. You may also have urgent or frequent urination.    Pyelonephritis. This is a kidney infection. If not treated, it can be serious and damage your kidneys. In severe cases, you may need to stay in the hospital. You may have a fever and lower back pain.  Medicines to treat a UTI  Most UTIs are treated with antibiotics. These kill the bacteria. The length of time you need to take them depends on the type of infection. It may be as short as 3 days. If you have repeated UTIs, you may need a low-dose antibiotic for several months. Take antibiotics exactly as directed. Don t stop taking them until all of the medicine is gone. If you stop taking the antibiotic too soon, the infection may not go away. You may also develop a resistance to the antibiotic. This can make it much harder to treat.  Lifestyle changes to treat and prevent UTIs  The lifestyle changes below will help get rid of your UTI. They may also help prevent future UTIs.    Drink plenty of fluids. This includes water, juice, or other caffeine-free drinks. Fluids help flush bacteria out of your body.    Empty your bladder.  Always empty your bladder when you feel the urge to urinate. And always urinate before going to sleep. Urine that stays in your bladder can lead to infection. Try to urinate before and after sex as well.    Practice good personal hygiene. Wipe yourself from front to back after using the toilet. This helps keep bacteria from getting into the urethra.    Use condoms during sex. These help prevent UTIs caused by sexually transmitted bacteria. Also don't use spermicides during sex. These can increase the risk for UTIs. Choose other forms of birth control instead. For women who tend to get UTIs after sex, a low-dose of a preventive antibiotic may be used. Be sure to discuss this option with your healthcare provider.    Follow up with your healthcare provider as directed. He or she may test to make sure the infection has cleared. If needed, more treatment may be started.  Date Last Reviewed: 1/1/2017 2000-2018 The "[employer]. [address] [place] [address]. All rights reserved. This information is not intended as a substitute for professional medical care. Always follow your healthcare professional's instructions.           Patient Education     Understanding Kidney Stones  Your kidneys are bean-shaped organs. They help filter extra salts, waste, and water from your body. You need to drink enough water every day to help flush the extra salts into your urine.     What are kidney stones?  Kidney stones are made up of chemical crystals that separate out from urine. These crystals clump together to make stones. They form in the calyx of the kidney. They may stay in the kidney or move into the urinary tract.   Why kidney stones form  Kidneys form stones for many reasons. If you don t drink enough water, for instance, you won t have enough urine to dilute chemicals. Then the chemicals may form crystals, which can develop into stones. Here are some reasons why kidney stones form:    Fluid loss  (dehydration). This can concentrate urine, causing stones to form.    Certain foods. Some foods contain large amounts of the chemicals that sometimes crystallize into stones. Eating foods that contain a lot of meat or salt can lead to more kidney stones.    Kidney infections. These infections foster stones by slowing urine flow or changing the acid balance of your urine.    Family history. If family members have had kidney stones, you re more likely to have them, too.    A lack of certain substances in your urine. Some substances can help protect you from forming stones. If you don t have enough of these in your urine, stone formation can increase.  Where stones form  Stones begin in the cup-shaped part of the kidney (calyx). Some stay in the calyx and grow. Others move into the kidney, pelvis, or into the ureter. There they can lodge, block the flow of urine, and cause pain.  Symptoms  Many stones cause sudden and severe pain and bloody urine. Others cause nausea or frequent, burning urination. Symptoms often depend on your stone s size and location. Fever may indicate a serious infection. Call your healthcare provider right away if you develop a fever.  Date Last Reviewed: 1/1/2017 2000-2018 [employer]. [address] [place] [address], [place] [address]. All rights reserved. This information is not intended as a substitute for professional medical care. Always follow your healthcare professional's instructions.                 Shannan Martinez MD  [facility]

## 2019-05-26 NOTE — PATIENT INSTRUCTIONS
If fever, pain is worse, start to vomit to ER     Start levaquin first dose today     Pyridium for pain will turn urine orange        Patient Education     Urinary Tract Infections in Women    Urinary tract infections (UTIs) are most often caused by bacteria. These bacteria enter the urinary tract. The bacteria may come from outside the body. Or they may travel from the skin outside the rectum or vagina into the urethra. Female anatomy makes it easy for bacteria from the bowel to enter a woman s urinary tract, which is the most common source of UTI. This means women develop UTIs more often than men. Pain in or around the urinary tract is a common UTI symptom. But the only way to know for sure if you have a UTI for the healthcare provider to test your urine. The two tests that may be done are the urinalysis and urine culture.  Types of UTIs    Cystitis. A bladder infection (cystitis) is the most common UTI in women. You may have urgent or frequent urination. You may also have pain, burning when you urinate, and bloody urine.    Urethritis. This is an inflamed urethra, which is the tube that carries urine from the bladder to outside the body. You may have lower stomach or back pain. You may also have urgent or frequent urination.    Pyelonephritis. This is a kidney infection. If not treated, it can be serious and damage your kidneys. In severe cases, you may need to stay in the hospital. You may have a fever and lower back pain.  Medicines to treat a UTI  Most UTIs are treated with antibiotics. These kill the bacteria. The length of time you need to take them depends on the type of infection. It may be as short as 3 days. If you have repeated UTIs, you may need a low-dose antibiotic for several months. Take antibiotics exactly as directed. Don t stop taking them until all of the medicine is gone. If you stop taking the antibiotic too soon, the infection may not go away. You may also develop a resistance to the  antibiotic. This can make it much harder to treat.  Lifestyle changes to treat and prevent UTIs  The lifestyle changes below will help get rid of your UTI. They may also help prevent future UTIs.    Drink plenty of fluids. This includes water, juice, or other caffeine-free drinks. Fluids help flush bacteria out of your body.    Empty your bladder. Always empty your bladder when you feel the urge to urinate. And always urinate before going to sleep. Urine that stays in your bladder can lead to infection. Try to urinate before and after sex as well.    Practice good personal hygiene. Wipe yourself from front to back after using the toilet. This helps keep bacteria from getting into the urethra.    Use condoms during sex. These help prevent UTIs caused by sexually transmitted bacteria. Also don't use spermicides during sex. These can increase the risk for UTIs. Choose other forms of birth control instead. For women who tend to get UTIs after sex, a low-dose of a preventive antibiotic may be used. Be sure to discuss this option with your healthcare provider.    Follow up with your healthcare provider as directed. He or she may test to make sure the infection has cleared. If needed, more treatment may be started.  Date Last Reviewed: 1/1/2017 2000-2018 The [employer]. [address] [place] [address], [place] [address]. All rights reserved. This information is not intended as a substitute for professional medical care. Always follow your healthcare professional's instructions.           Patient Education     Understanding Kidney Stones  Your kidneys are bean-shaped organs. They help filter extra salts, waste, and water from your body. You need to drink enough water every day to help flush the extra salts into your urine.     What are kidney stones?  Kidney stones are made up of chemical crystals that separate out from urine. These crystals clump together to make stones. They form in the calyx of the kidney. They may  stay in the kidney or move into the urinary tract.   Why kidney stones form  Kidneys form stones for many reasons. If you don t drink enough water, for instance, you won t have enough urine to dilute chemicals. Then the chemicals may form crystals, which can develop into stones. Here are some reasons why kidney stones form:    Fluid loss (dehydration). This can concentrate urine, causing stones to form.    Certain foods. Some foods contain large amounts of the chemicals that sometimes crystallize into stones. Eating foods that contain a lot of meat or salt can lead to more kidney stones.    Kidney infections. These infections foster stones by slowing urine flow or changing the acid balance of your urine.    Family history. If family members have had kidney stones, you re more likely to have them, too.    A lack of certain substances in your urine. Some substances can help protect you from forming stones. If you don t have enough of these in your urine, stone formation can increase.  Where stones form  Stones begin in the cup-shaped part of the kidney (calyx). Some stay in the calyx and grow. Others move into the kidney, pelvis, or into the ureter. There they can lodge, block the flow of urine, and cause pain.  Symptoms  Many stones cause sudden and severe pain and bloody urine. Others cause nausea or frequent, burning urination. Symptoms often depend on your stone s size and location. Fever may indicate a serious infection. Call your healthcare provider right away if you develop a fever.  Date Last Reviewed: 1/1/2017 2000-2018 [employer]. [address] [place] [address], [place] [address]. All rights reserved. This information is not intended as a substitute for professional medical care. Always follow your healthcare professional's instructions.

## 2019-05-28 LAB
BACTERIA SPEC CULT: NORMAL
SPECIMEN SOURCE: NORMAL

## 2019-07-02 ENCOUNTER — NURSE TRIAGE (OUTPATIENT)
Dept: NURSING | Facility: CLINIC | Age: 64
End: 2019-07-02

## 2019-07-02 ENCOUNTER — OFFICE VISIT (OUTPATIENT)
Dept: FAMILY MEDICINE | Facility: CLINIC | Age: 64
End: 2019-07-02
Payer: COMMERCIAL

## 2019-07-02 VITALS
DIASTOLIC BLOOD PRESSURE: 72 MMHG | SYSTOLIC BLOOD PRESSURE: 112 MMHG | BODY MASS INDEX: 32.2 KG/M2 | HEIGHT: 62 IN | HEART RATE: 78 BPM | TEMPERATURE: 98.1 F | OXYGEN SATURATION: 97 % | WEIGHT: 175 LBS

## 2019-07-02 DIAGNOSIS — F41.9 ANXIETY: ICD-10-CM

## 2019-07-02 DIAGNOSIS — R19.7 DIARRHEA OF PRESUMED INFECTIOUS ORIGIN: Primary | ICD-10-CM

## 2019-07-02 DIAGNOSIS — F33.1 MODERATE RECURRENT MAJOR DEPRESSION (H): ICD-10-CM

## 2019-07-02 PROCEDURE — 99214 OFFICE O/P EST MOD 30 MIN: CPT | Performed by: NURSE PRACTITIONER

## 2019-07-02 RX ORDER — LORAZEPAM 0.5 MG/1
TABLET ORAL
Qty: 30 TABLET | Refills: 0 | Status: SHIPPED | OUTPATIENT
Start: 2019-07-02 | End: 2019-09-12

## 2019-07-02 ASSESSMENT — PATIENT HEALTH QUESTIONNAIRE - PHQ9
SUM OF ALL RESPONSES TO PHQ QUESTIONS 1-9: 9
5. POOR APPETITE OR OVEREATING: SEVERAL DAYS

## 2019-07-02 ASSESSMENT — ANXIETY QUESTIONNAIRES
5. BEING SO RESTLESS THAT IT IS HARD TO SIT STILL: SEVERAL DAYS
3. WORRYING TOO MUCH ABOUT DIFFERENT THINGS: SEVERAL DAYS
2. NOT BEING ABLE TO STOP OR CONTROL WORRYING: SEVERAL DAYS
7. FEELING AFRAID AS IF SOMETHING AWFUL MIGHT HAPPEN: NOT AT ALL
6. BECOMING EASILY ANNOYED OR IRRITABLE: MORE THAN HALF THE DAYS
GAD7 TOTAL SCORE: 8
1. FEELING NERVOUS, ANXIOUS, OR ON EDGE: MORE THAN HALF THE DAYS

## 2019-07-02 ASSESSMENT — MIFFLIN-ST. JEOR: SCORE: 1288.07

## 2019-07-02 NOTE — PROGRESS NOTES
Subjective     Austin Shaffer is a 63 year old female who presents to clinic today for the following health issues:    HPI     Diarrhea  Onset: x a little over a week    Patient reports onset of diarrhea 8-9 days ago.  Progressive watery diarrhea after the 1st day.  No significant cramping in abdomen.    Diarrhea frequency and intensity has been going up and down.        Description:   Consistency of stool: watery and sometimes together/ brown   +Malodorous  Blood in stool: no   Number of loose stools in past 24 hours: 4    Progression of Symptoms:  same    Accompanying Signs & Symptoms:  Fever: no   Nausea or vomiting; YES- nauseous and not sure if heart burn - acid feeling  Abdominal pain: no   Episodes of constipation: no   Weight loss: no     History:   Ill contacts: no   Recent use of antibiotics: YES- 3 week ago had a bladder infection and was on Levaquin x 7 days   Recent travels: no          Recent medication-new or changes(Rx or OTC): no     Precipitating factors:   nothing    Alleviating factors:   nothing    Therapies Tried and outcome:  Imodium AD; Outcome: helps for a day but then it comes back  Patient Active Problem List   Diagnosis     Moderate recurrent major depression (H)     Intermittent asthma     CARDIOVASCULAR SCREENING; LDL GOAL LESS THAN 160     Advanced directives, counseling/discussion     Anxiety     Vitamin D deficiency     Past Surgical History:   Procedure Laterality Date     ABDOMEN SURGERY      ? Enteritis as a child, possibly appendix     HYSTERECTOMY TOTAL ABDOMINAL, BILATERAL SALPINGO-OOPHORECTOMY, COMBINED  1998     TONSILLECTOMY         Social History     Tobacco Use     Smoking status: Never Smoker     Smokeless tobacco: Never Used   Substance Use Topics     Alcohol use: Yes     Alcohol/week: 0.0 oz     Comment: 1-2 per month      Family History   Problem Relation Age of Onset     Cardiovascular Mother      Hypertension Mother      Cardiovascular Maternal Grandmother       "Cancer Father         Kidney     Myocardial Infarction Brother 60        2017     Cerebrovascular Disease Brother 63        2017     Diabetes Brother      Myocardial Infarction Brother      Hypertension Brother          Current Outpatient Medications   Medication Sig Dispense Refill     LORazepam (ATIVAN) 0.5 MG tablet TAKE 1/2 TO 1 (ONE-HALF TO ONE) TABLET BY MOUTH EVERY 8 HOURS AS NEEDED FOR  ANXIETY. 30 tablet 0     albuterol (PROAIR HFA) 108 (90 Base) MCG/ACT inhaler Inhale 2 puffs into the lungs every 6 hours as needed for shortness of breath / dyspnea 3 Inhaler 1     betamethasone dipropionate (DIPROSONE) 0.05 % external cream Apply to trunk, arms and legs bid for 10-14 consecutive days, Not to be used on face or groin 45 g 1     betamethasone dipropionate (DIPROSONE) 0.05 % lotion Apply to scalp at bedtime when needed 60 mL 1     hydrOXYzine (ATARAX) 25 MG tablet 1 tab at bedtime 90 tablet 3     venlafaxine (EFFEXOR-XR) 75 MG 24 hr capsule Take 1 capsule (75 mg) by mouth daily 90 capsule 3     Allergies   Allergen Reactions     Sulfa Drugs      Morphine Difficulty breathing     Has 10 tablets remaining of Ativan.    Anxiety triggered by her mother.      Reviewed and updated as needed this visit by Provider         Review of Systems   ROS COMP: Constitutional, HEENT, cardiovascular, pulmonary, gi and gu systems are negative, except as otherwise noted.      Objective    /72 (BP Location: Right arm, Patient Position: Sitting, Cuff Size: Adult Regular)   Pulse 78   Temp 98.1  F (36.7  C) (Oral)   Ht 1.568 m (5' 1.75\")   Wt 79.4 kg (175 lb)   SpO2 97%   BMI 32.27 kg/m    Body mass index is 32.27 kg/m .  Physical Exam     GENERAL: healthy, alert and no distress  RESP: lungs clear to auscultation - no rales, rhonchi or wheezes  CV: regular rate and rhythm, normal S1 S2, no S3 or S4, no murmur, click or rub, no peripheral edema  ABDOMEN: soft, nontender, no hepatosplenomegaly, no masses and bowel sounds " normal  NEURO: Normal strength and tone, mentation intact and speech normal  PSYCH: mentation appears normal, affect normal/bright          Assessment & Plan     Austin was seen today for diarrhea.    Diagnoses and all orders for this visit:    Diarrhea of presumed infectious origin  Recent antibiotic use, plan to evaluate for C-diff.    -     Clostridium difficile Toxin B PCR; Future  -     Enteric Bacteria and Virus Panel by LUIS CARLOS Stool; Future    Moderate recurrent major depression (H)  Anxiety  Currently on Effexor XR, 75 mg daily.  Consider dose increase at follow-up, would like to see better mood/anxiety control and decreased use of Lorazepam.    PHQ-9 SCORE 5/7/2018 10/1/2018 7/2/2019   PHQ-9 Total Score - - -   PHQ-9 Total Score MyChart - - -   PHQ-9 Total Score 9 5 9     ELEAZAR-7 SCORE 5/7/2018 10/1/2018 7/2/2019   Total Score - - -   Total Score - - -   Total Score 8 5 8       -     LORazepam (ATIVAN) 0.5 MG tablet; TAKE 1/2 TO 1 (ONE-HALF TO ONE) TABLET BY MOUTH EVERY 8 HOURS AS NEEDED FOR  ANXIETY.      Return in about 1 week (around 7/9/2019) for No improvement or worsening of symptoms.    Encouraged follow-up for preventative visit, mood recheck.        TANYA [facility]a [facility]

## 2019-07-02 NOTE — TELEPHONE ENCOUNTER
Patient states she has had diarrhea for over 1 week; has 1-3 episodes per day.  Has tried OTC anti-diarrheal with little relief.  Also complains of nausea and excessive burping.  Denies fever.  Transferred to [facility] to be seen within 3 days.    Additional Information    Negative: Shock suspected (e.g., cold/pale/clammy skin, too weak to stand, low BP, rapid pulse)    Negative: Difficult to awaken or acting confused (e.g., disoriented, slurred speech)    Negative: Sounds like a life-threatening emergency to the triager    Negative: Vomiting also present and worse than the diarrhea    Negative: Blood in stool and without diarrhea    Negative: SEVERE abdominal pain (e.g., excruciating) and present > 1 hour    Negative: SEVERE abdominal pain and age > 60    Negative: Bloody, black, or tarry bowel movements    Negative: SEVERE diarrhea (e.g., 7 or more times / day more than normal) and age > 60 years    Negative: Constant abdominal pain lasting > 2 hours    Negative: Drinking very little and has signs of dehydration (e.g., no urine > 12 hours, very dry mouth, very lightheaded)    Negative: Patient sounds very sick or weak to the triager    Negative: SEVERE diarrhea (e.g., 7 or more times / day more than normal) and present > 24 hours (1 day)    Negative: MODERATE diarrhea (e.g., 4-6 times / day more than normal) and present > 48 hours (2 days)    Negative: MODERATE diarrhea (e.g., 4-6 times / day more than normal) and age > 70 years    Negative: Abdominal pain  (Exception: pain clears completely with each passage of diarrhea stool)    Negative: Fever > 101 F (38.3 C)    Negative: Blood in the stool    Negative: Mucus or pus in stool has been present > 2 days and diarrhea is more than mild    Negative: Weak immune system (e.g., HIV positive, cancer chemo, splenectomy, organ transplant, chronic steroids)    Negative: Travel to a foreign country in past month    Negative: Recent antibiotic therapy (i.e., within last 2  months) and diarrhea present > 3 days since antibiotic was stopped    Negative: Recent hospitalization and diarrhea present > 3 days    Negative: Tube feedings (e.g., nasogastric, g-tube, j-tube)    MILD diarrhea (e.g., 1-3 or more stools than normal in past 24 hours) diarrhea without known cause and present > 7 days    Protocols used: DIARRHEA-A-OH

## 2019-07-03 ASSESSMENT — ASTHMA QUESTIONNAIRES: ACT_TOTALSCORE: 24

## 2019-07-03 ASSESSMENT — ANXIETY QUESTIONNAIRES: GAD7 TOTAL SCORE: 8

## 2019-07-08 PROCEDURE — 87506 IADNA-DNA/RNA PROBE TQ 6-11: CPT | Performed by: NURSE PRACTITIONER

## 2019-07-08 PROCEDURE — 87493 C DIFF AMPLIFIED PROBE: CPT | Mod: 59 | Performed by: NURSE PRACTITIONER

## 2019-07-09 DIAGNOSIS — R19.7 DIARRHEA OF PRESUMED INFECTIOUS ORIGIN: ICD-10-CM

## 2019-07-09 LAB
C COLI+JEJUNI+LARI FUSA STL QL NAA+PROBE: NOT DETECTED
C DIFF TOX B STL QL: NEGATIVE
EC STX1 GENE STL QL NAA+PROBE: NOT DETECTED
EC STX2 GENE STL QL NAA+PROBE: NOT DETECTED
ENTERIC PATHOGEN COMMENT: NORMAL
NOROV GI+II ORF1-ORF2 JNC STL QL NAA+PR: NOT DETECTED
RVA NSP5 STL QL NAA+PROBE: NOT DETECTED
SALMONELLA SP RPOD STL QL NAA+PROBE: NOT DETECTED
SHIGELLA SP+EIEC IPAH STL QL NAA+PROBE: NOT DETECTED
SPECIMEN SOURCE: NORMAL
V CHOL+PARA RFBL+TRKH+TNAA STL QL NAA+PR: NOT DETECTED
Y ENTERO RECN STL QL NAA+PROBE: NOT DETECTED

## 2019-07-09 NOTE — RESULT ENCOUNTER NOTE
Dear Austin,     C-diff stool sample was negative.      Please send a [employer] message or call [phone]  if you have any questions.      Dunia Desai, TANYA, CNP  [place] [facility]    If you have further questions about the interpretation of your labs, [email or web address] is a good website to check out for further information.

## 2019-07-10 ENCOUNTER — MYC MEDICAL ADVICE (OUTPATIENT)
Dept: FAMILY MEDICINE | Facility: CLINIC | Age: 64
End: 2019-07-10

## 2019-07-10 NOTE — TELEPHONE ENCOUNTER
Covering providers,  please see question from Austin and advise.  Dunia had recommended recheck, patient advised of this via MyChart . Thank you, Shelly Dillon R.N.

## 2019-07-16 ENCOUNTER — OFFICE VISIT (OUTPATIENT)
Dept: FAMILY MEDICINE | Facility: CLINIC | Age: 64
End: 2019-07-16
Payer: COMMERCIAL

## 2019-07-16 VITALS
BODY MASS INDEX: 31.47 KG/M2 | HEART RATE: 88 BPM | OXYGEN SATURATION: 98 % | TEMPERATURE: 97.8 F | HEIGHT: 62 IN | DIASTOLIC BLOOD PRESSURE: 78 MMHG | WEIGHT: 171 LBS | SYSTOLIC BLOOD PRESSURE: 105 MMHG

## 2019-07-16 DIAGNOSIS — R19.7 DIARRHEA, UNSPECIFIED TYPE: Primary | ICD-10-CM

## 2019-07-16 DIAGNOSIS — R10.84 ABDOMINAL PAIN, GENERALIZED: ICD-10-CM

## 2019-07-16 LAB
ERYTHROCYTE [DISTWIDTH] IN BLOOD BY AUTOMATED COUNT: 12.6 % (ref 10–15)
HCT VFR BLD AUTO: 45.2 % (ref 35–47)
HGB BLD-MCNC: 14.8 G/DL (ref 11.7–15.7)
MCH RBC QN AUTO: 29 PG (ref 26.5–33)
MCHC RBC AUTO-ENTMCNC: 32.7 G/DL (ref 31.5–36.5)
MCV RBC AUTO: 89 FL (ref 78–100)
PLATELET # BLD AUTO: 318 10E9/L (ref 150–450)
RBC # BLD AUTO: 5.11 10E12/L (ref 3.8–5.2)
WBC # BLD AUTO: 5.2 10E9/L (ref 4–11)

## 2019-07-16 PROCEDURE — 80048 BASIC METABOLIC PNL TOTAL CA: CPT | Performed by: NURSE PRACTITIONER

## 2019-07-16 PROCEDURE — 85027 COMPLETE CBC AUTOMATED: CPT | Performed by: NURSE PRACTITIONER

## 2019-07-16 PROCEDURE — 36415 COLL VENOUS BLD VENIPUNCTURE: CPT | Performed by: NURSE PRACTITIONER

## 2019-07-16 PROCEDURE — 99213 OFFICE O/P EST LOW 20 MIN: CPT | Performed by: NURSE PRACTITIONER

## 2019-07-16 ASSESSMENT — MIFFLIN-ST. JEOR: SCORE: 1269.93

## 2019-07-16 NOTE — PROGRESS NOTES
Subjective     Austin Shaffer is a 63 year old female who presents to clinic today for the following health issues:    HPI     Recheck from 2019 seen for diarrhea,    Intermittent diarrhea, anytime she eats afterwards feels pain in her belly. Sometimes feels nauseous.  Has not gotten worse just feels the same, barely eats afraid to.   Has used alk seltzer, and drinking ginger tea- the ginger tea seems to settle stomach.   Can have an episode of 3 days without a bowel movement and then will have return of watery diarrhea that will be persistent and last all night.      Was at a  approximately 1 month ago.  No swimming. No recent travel.      Patient Active Problem List   Diagnosis     Moderate recurrent major depression (H)     Intermittent asthma     CARDIOVASCULAR SCREENING; LDL GOAL LESS THAN 160     Advanced directives, counseling/discussion     Anxiety     Vitamin D deficiency     Past Surgical History:   Procedure Laterality Date     ABDOMEN SURGERY      ? Enteritis as a child, possibly appendix     HYSTERECTOMY TOTAL ABDOMINAL, BILATERAL SALPINGO-OOPHORECTOMY, COMBINED  1998     TONSILLECTOMY         Social History     Tobacco Use     Smoking status: Never Smoker     Smokeless tobacco: Never Used   Substance Use Topics     Alcohol use: Yes     Alcohol/week: 0.0 oz     Comment: 1-2 per month      Family History   Problem Relation Age of Onset     Cardiovascular Mother      Hypertension Mother      Cardiovascular Maternal Grandmother      Cancer Father         Kidney     Myocardial Infarction Brother 60        2017     Cerebrovascular Disease Brother 63        2017     Diabetes Brother      Myocardial Infarction Brother      Hypertension Brother          Current Outpatient Medications   Medication Sig Dispense Refill     albuterol (PROAIR HFA) 108 (90 Base) MCG/ACT inhaler Inhale 2 puffs into the lungs every 6 hours as needed for shortness of breath / dyspnea 3 Inhaler 1     betamethasone  "dipropionate (DIPROSONE) 0.05 % external cream Apply to trunk, arms and legs bid for 10-14 consecutive days, Not to be used on face or groin 45 g 1     betamethasone dipropionate (DIPROSONE) 0.05 % lotion Apply to scalp at bedtime when needed 60 mL 1     hydrOXYzine (ATARAX) 25 MG tablet 1 tab at bedtime 90 tablet 3     LORazepam (ATIVAN) 0.5 MG tablet TAKE 1/2 TO 1 (ONE-HALF TO ONE) TABLET BY MOUTH EVERY 8 HOURS AS NEEDED FOR  ANXIETY. 30 tablet 0     venlafaxine (EFFEXOR-XR) 75 MG 24 hr capsule Take 1 capsule (75 mg) by mouth daily 90 capsule 3     Allergies   Allergen Reactions     Sulfa Drugs      Morphine Difficulty breathing         Reviewed and updated as needed this visit by Provider         Review of Systems   ROS COMP: Constitutional, HEENT, cardiovascular, pulmonary, gi and gu systems are negative, except as otherwise noted.      Objective    /78 (BP Location: Right arm, Patient Position: Sitting, Cuff Size: Adult Regular)   Pulse 88   Temp 97.8  F (36.6  C) (Oral)   Ht 1.568 m (5' 1.75\")   Wt 77.6 kg (171 lb)   SpO2 98%   BMI 31.53 kg/m    Body mass index is 31.53 kg/m .  Physical Exam     GENERAL: healthy, alert and no distress  RESP: lungs clear to auscultation - no rales, rhonchi or wheezes  CV: regular rate and rhythm, normal S1 S2, no S3 or S4, no murmur, click or rub, no peripheral edema  ABDOMEN: soft, nontender, no hepatosplenomegaly, no masses and bowel sounds normal  PSYCH: mentation appears normal, affect normal/bright        Assessment & Plan     Austin was seen today for recheck.    Diagnoses and all orders for this visit:    Diarrhea, unspecified type  Continued intermittent watery diarrhea associated with abdominal cramps.   Will plan to check electrolytes and additional stool studies.  Results pending.    Will notify patient of results once available.    -     Basic metabolic panel  (Ca, Cl, CO2, Creat, Gluc, K, Na, BUN)  -     CBC with platelets  -     Cryptosporidium in " stool, stain; Future  -     Ova and Parasite Exam Routine; Future  -     Giardia antigen; Future        Return in about 2 weeks (around 7/30/2019) for No improvement or worsening of symptoms.    [facility]ANYA [facility]

## 2019-07-17 DIAGNOSIS — R19.7 DIARRHEA, UNSPECIFIED TYPE: ICD-10-CM

## 2019-07-17 LAB
ANION GAP SERPL CALCULATED.3IONS-SCNC: 10 MMOL/L (ref 3–14)
BUN SERPL-MCNC: 7 MG/DL (ref 7–30)
CALCIUM SERPL-MCNC: 9.3 MG/DL (ref 8.5–10.1)
CHLORIDE SERPL-SCNC: 103 MMOL/L (ref 94–109)
CO2 SERPL-SCNC: 26 MMOL/L (ref 20–32)
CREAT SERPL-MCNC: 0.86 MG/DL (ref 0.52–1.04)
GFR SERPL CREATININE-BSD FRML MDRD: 71 ML/MIN/{1.73_M2}
GLUCOSE SERPL-MCNC: 91 MG/DL (ref 70–99)
POTASSIUM SERPL-SCNC: 4.2 MMOL/L (ref 3.4–5.3)
SODIUM SERPL-SCNC: 139 MMOL/L (ref 133–144)

## 2019-07-17 PROCEDURE — 87209 SMEAR COMPLEX STAIN: CPT | Performed by: NURSE PRACTITIONER

## 2019-07-17 PROCEDURE — 87206 SMEAR FLUORESCENT/ACID STAI: CPT | Mod: 59 | Performed by: NURSE PRACTITIONER

## 2019-07-17 PROCEDURE — 87177 OVA AND PARASITES SMEARS: CPT | Mod: 59 | Performed by: NURSE PRACTITIONER

## 2019-07-17 PROCEDURE — 87329 GIARDIA AG IA: CPT | Performed by: NURSE PRACTITIONER

## 2019-07-17 NOTE — RESULT ENCOUNTER NOTE
Krys Avila,     -Normal red blood cell (hgb) levels, normal white blood cell count and normal platelet levels.  -Kidney function is normal (Cr, GFR), Sodium is normal, Potassium is normal, Calcium is normal, Glucose is normal.       Please send a [employer] message or call [phone]  if you have any questions.      Dunia Desai, APRN, CNP  [place] [facility]    If you have further questions about the interpretation of your labs, [email or web address] is a good website to check out for further information.

## 2019-07-18 LAB
G LAMBLIA AG STL QL IA: NORMAL
O+P STL MICRO: NORMAL
O+P STL MICRO: NORMAL
SPECIMEN SOURCE: NORMAL
SPECIMEN SOURCE: NORMAL

## 2019-07-19 LAB
O+P STL CONC: NORMAL
SPECIMEN SOURCE: NORMAL

## 2019-07-23 ENCOUNTER — TRANSFERRED RECORDS (OUTPATIENT)
Dept: HEALTH INFORMATION MANAGEMENT | Facility: CLINIC | Age: 64
End: 2019-07-23

## 2019-08-16 ENCOUNTER — MEDICAL CORRESPONDENCE (OUTPATIENT)
Dept: HEALTH INFORMATION MANAGEMENT | Facility: CLINIC | Age: 64
End: 2019-08-16

## 2019-08-16 ENCOUNTER — TRANSFERRED RECORDS (OUTPATIENT)
Dept: HEALTH INFORMATION MANAGEMENT | Facility: CLINIC | Age: 64
End: 2019-08-16

## 2019-08-23 DIAGNOSIS — R19.8: ICD-10-CM

## 2019-08-23 DIAGNOSIS — R10.13 EPIGASTRIC PAIN: Primary | ICD-10-CM

## 2019-08-23 DIAGNOSIS — R63.4 ABNORMAL LOSS OF WEIGHT: ICD-10-CM

## 2019-08-23 DIAGNOSIS — K31.9: ICD-10-CM

## 2019-08-23 DIAGNOSIS — R19.7 DIARRHEA: ICD-10-CM

## 2019-08-23 DIAGNOSIS — K26.9 DUODENAL ULCER: ICD-10-CM

## 2019-08-23 DIAGNOSIS — K64.8 HEMORRHOIDS, INTERNAL: ICD-10-CM

## 2019-08-26 PROBLEM — R19.7 OVERFLOW DIARRHEA: Status: ACTIVE | Noted: 2019-08-26

## 2019-09-03 ENCOUNTER — TRANSFERRED RECORDS (OUTPATIENT)
Dept: HEALTH INFORMATION MANAGEMENT | Facility: CLINIC | Age: 64
End: 2019-09-03

## 2019-09-09 ENCOUNTER — TRANSFERRED RECORDS (OUTPATIENT)
Dept: HEALTH INFORMATION MANAGEMENT | Facility: CLINIC | Age: 64
End: 2019-09-09

## 2019-09-09 DIAGNOSIS — K90.0 CELIAC DISEASE: Primary | ICD-10-CM

## 2019-09-12 ENCOUNTER — MYC REFILL (OUTPATIENT)
Dept: FAMILY MEDICINE | Facility: CLINIC | Age: 64
End: 2019-09-12

## 2019-09-12 DIAGNOSIS — F41.9 ANXIETY: ICD-10-CM

## 2019-09-13 RX ORDER — LORAZEPAM 0.5 MG/1
TABLET ORAL
Qty: 15 TABLET | Refills: 0 | Status: SHIPPED | OUTPATIENT
Start: 2019-09-13 | End: 2019-10-15

## 2019-09-13 NOTE — TELEPHONE ENCOUNTER
LORazepam (ATIVAN) 0.5 MG tablet      Last Written Prescription Date:  7/2/2019  Last Fill Quantity: 30 tablet,   # refills: 0  Last Office Visit: 7/16/2019 Giselle    Future Office visit:    Next 5 appointments (look out 90 days)    Oct 03, 2019  9:40 AM CDT  MyChart Physical Adult with Dunia Desai, APRN CNP  [facility] ([facility]) [address]  [facility] [id number] [phone]           Routing refill request to provider for review/approval because:  Drug not on the [facility], [facility] or [facility] refill protocol or controlled substance

## 2019-09-13 NOTE — TELEPHONE ENCOUNTER
Last filled in July 2019.  Will need to discuss anxiety at follow-up appointment in October. - Alis, CNP

## 2019-09-16 ENCOUNTER — TRANSFERRED RECORDS (OUTPATIENT)
Dept: HEALTH INFORMATION MANAGEMENT | Facility: CLINIC | Age: 64
End: 2019-09-16

## 2019-09-16 DIAGNOSIS — F41.9 ANXIETY: ICD-10-CM

## 2019-09-16 RX ORDER — LORAZEPAM 0.5 MG/1
TABLET ORAL
Qty: 30 TABLET | Refills: 0 | OUTPATIENT
Start: 2019-09-16

## 2019-09-16 NOTE — TELEPHONE ENCOUNTER
LORazepam (ATIVAN) 0.5 MG tablet    Medication may not be due for a refill.    Last Written Prescription Date:  9/13/2019  Last Fill Quantity: 15 tablet,   # refills: 0  Last Office Visit: 7/16/2019  Giselle  Future Office visit:    Next 5 appointments (look out 90 days)    Oct 03, 2019  9:40 AM CDT  MyChart Physical Adult with Dunia Desai, APRN CNP  [facility] ([facility]) [address] [place] [address] [phone]           Routing refill request to provider for review/approval because:  Drug not on the [facility], [facility] or [facility] refill protocol or controlled substance

## 2019-09-23 ENCOUNTER — TELEPHONE (OUTPATIENT)
Dept: FAMILY MEDICINE | Facility: CLINIC | Age: 64
End: 2019-09-23

## 2019-09-23 NOTE — TELEPHONE ENCOUNTER
Needs of attention regarding:  -Wellness (Physical) Visit     Health Maintenance Topics with due status: Overdue       Topic Date Due    DEXA 1955    ZOSTER IMMUNIZATION 11/04/2003    ADVANCE CARE PLANNING 03/27/2018    PNEUMOCOCCAL IMMUNIZATION 65+ LOW/MEDIUM RISK 11/04/2018    INFLUENZA VACCINE 09/01/2019     Health Maintenance Topics with due status: Due Soon       Topic Date Due    MEDICARE ANNUAL WELLNESS VISIT 10/01/2019    ASTHMA ACTION PLAN 10/01/2019       Communication:  See Letter

## 2019-09-23 NOTE — LETTER
[facility]  [address]  [facility] [id number] [phone]  September 23, 2019    Austin Shaffer  [address] [facility] [address]    Dear Austin,    I care about your health and have reviewed your health plan. I have reviewed your medical conditions, medication list, and lab results and am making recommendations based on this review, to better manage your health.    You are in particular need of attention regarding:  -Wellness (Physical) Visit     I am recommending that you:  -schedule a WELLNESS (Physical) APPOINTMENT with me.   I will check fasting labs the same day - nothing to eat except water and meds for 8-10 hours prior.    Here is a list of Health Maintenance topics that are due now or due soon:  Health Maintenance Due   Topic Date Due     DEXA  1955     ZOSTER IMMUNIZATION (1 of 2) 11/04/2003     ADVANCE CARE PLANNING  03/27/2018     PNEUMOCOCCAL IMMUNIZATION 65+ LOW/MEDIUM RISK (1 of 2 - PCV13) 11/04/2018     INFLUENZA VACCINE (1) 09/01/2019     MEDICARE ANNUAL WELLNESS VISIT  10/01/2019     ASTHMA ACTION PLAN  10/01/2019     Please call us at [phone] (or use [employer]) to address the above recommendations.   Thank you for trusting [facility] and we appreciate the opportunity to serve you.  We look forward to supporting your healthcare needs in the future.    Healthy Regards,    Dunia Desai, APRN, CNP

## 2019-09-29 ENCOUNTER — HEALTH MAINTENANCE LETTER (OUTPATIENT)
Age: 64
End: 2019-09-29

## 2019-09-30 ASSESSMENT — ACTIVITIES OF DAILY LIVING (ADL): CURRENT_FUNCTION: SHOPPING REQUIRES ASSISTANCE

## 2019-10-01 ASSESSMENT — ACTIVITIES OF DAILY LIVING (ADL): CURRENT_FUNCTION: SHOPPING REQUIRES ASSISTANCE

## 2019-10-01 NOTE — PROGRESS NOTES
"SUBJECTIVE:   Austin Shaffer is a 63 year old female who presents for Preventive Visit.    Are you in the first 12 months of your Medicare coverage?  Yes,  Visual Acuity:  Right Eye: 20/30    Left Eye: 20/25  Both Eyes: 20/25    Celiac Disease:    Diagnosed with Celiac Disease 2-3 weeks ago- through  [facility]. New dietary changes have been challenging. No more diarrhea and feels pretty good. Takes Omeprazole BID  For subsequent duodenal ulcers that were diagnosed at [facility].    Anxiety:    Recent increase in anxiety symptoms related to diagnosis of Celiac Disease. Feels stable on Effexor XR, 75 mg daily.   Does use Lorazepam every 2-3 days as needed, not always though.  She has not gotten refill of Lorazepam from 9/13/19.  Has pills remaining at home.        Healthy Habits:     In general, how would you rate your overall health?  Fair    Frequency of exercise:  1 day/week    Duration of exercise:  15-30 minutes    Do you usually eat at least 4 servings of fruit and vegetables a day, include whole grains    & fiber and avoid regularly eating high fat or \"junk\" foods?  Yes    Taking medications regularly:  Yes    Barriers to taking medications:  Not applicable    Medication side effects:  None    Ability to successfully perform activities of daily living:  Shopping requires assistance    Home Safety:  No safety concerns identified    Hearing Impairment:  Difficulty following a conversation in a noisy restaurant or crowded room, need to ask people to speak up or repeat themselves and difficulty understanding soft or whispered speech    In the past 6 months, have you been bothered by leaking of urine? Yes    In general, how would you rate your overall mental or emotional health?  Fair      PHQ-2 Total Score: 2    Additional concerns today:  No       Do you feel safe in your environment? Yes    Do you have a Health Care Directive? Yes: Patient states has Advance Directive and will bring in a copy to " clinic.      Fall risk  Fallen 2 or more times in the past year?: No  Any fall with injury in the past year?: No    Cognitive Screening   1) Repeat 3 items (Leader, Season, Table)    2) Clock draw: NORMAL  3) 3 item recall: Recalls 3 objects  Results: 3 items recalled: COGNITIVE IMPAIRMENT LESS LIKELY    Mini-CogTM Copyright BRAULIO Anaya. Licensed by the author for use in [facility]; reprinted with permission (soob@[facility]). All rights reserved.      Do you have sleep apnea, excessive snoring or daytime drowsiness?: no    Reviewed and updated as needed this visit by clinical staff  Tobacco  Allergies  Meds         Reviewed and updated as needed this visit by Provider  Meds        Social History     Tobacco Use     Smoking status: Former Smoker     Packs/day: 1.00     Years: 5.00     Pack years: 5.00     Types: Cigarettes     Start date: 1969     Last attempt to quit: 3/20/1974     Years since quittin.5     Smokeless tobacco: Never Used   Substance Use Topics     Alcohol use: Yes     Alcohol/week: 0.0 standard drinks     Comment: About 3-4 a mounth       Alcohol Use 2019   Prescreen: >3 drinks/day or >7 drinks/week? No   Prescreen: >3 drinks/day or >7 drinks/week? -         Patient Care Team:  Reji Patterson Jr., MD as PCP - General (Family Practice)  Manuel Mancilla DO as Assigned PCP    The following health maintenance items are reviewed in Epic and correct as of today:  Health Maintenance   Topic Date Due     DEXA  1955     ZOSTER IMMUNIZATION (1 of 2) 2003     ADVANCE CARE PLANNING  2018     PNEUMOCOCCAL IMMUNIZATION 65+ LOW/MEDIUM RISK (1 of 2 - PCV13) 2018     MEDICARE ANNUAL WELLNESS VISIT  10/01/2019     ASTHMA ACTION PLAN  10/01/2019     ASTHMA CONTROL TEST  2020     PHQ-9  2020     MAMMO SCREENING  2020     FALL RISK ASSESSMENT  2020     LIPID  10/01/2023     DTAP/TDAP/TD IMMUNIZATION (3 - Td) 09/15/2026     COLONOSCOPY   2029     HEPATITIS C SCREENING  Completed     HIV SCREENING  Completed     DEPRESSION ACTION PLAN  Completed     INFLUENZA VACCINE  Addressed     IPV IMMUNIZATION  Aged Out     MENINGITIS IMMUNIZATION  Aged Out     BP Readings from Last 3 Encounters:   10/03/19 108/74   19 105/78   19 112/72    Wt Readings from Last 3 Encounters:   10/03/19 74.4 kg (164 lb)   19 77.6 kg (171 lb)   19 79.4 kg (175 lb)                  Patient Active Problem List   Diagnosis     Moderate recurrent major depression (H)     Intermittent asthma     CARDIOVASCULAR SCREENING; LDL GOAL LESS THAN 160     Advanced directives, counseling/discussion     Anxiety     Vitamin D deficiency     Overflow diarrhea     Celiac disease     Past Surgical History:   Procedure Laterality Date     ABDOMEN SURGERY      ? Enteritis as a child, possibly appendix     APPENDECTOMY       COLONOSCOPY      Cant remember the date     GENITOURINARY SURGERY      Cant remember the date     HYSTERECTOMY TOTAL ABDOMINAL, BILATERAL SALPINGO-OOPHORECTOMY, COMBINED  1998     TONSILLECTOMY         Social History     Tobacco Use     Smoking status: Former Smoker     Packs/day: 1.00     Years: 5.00     Pack years: 5.00     Types: Cigarettes     Start date: 1969     Last attempt to quit: 3/20/1974     Years since quittin.5     Smokeless tobacco: Never Used   Substance Use Topics     Alcohol use: Yes     Alcohol/week: 0.0 standard drinks     Comment: About 3-4 a [facility]     Family History   Problem Relation Age of Onset     Cardiovascular Mother      Hypertension Mother      Cardiovascular Maternal Grandmother      Cancer Father         Kidney     Other Cancer Father      Myocardial Infarction Brother 60        2017     Cerebrovascular Disease Brother 63        2017     Mental Illness Brother      Diabetes Brother      Myocardial Infarction Brother      Hypertension Brother      Cerebrovascular Disease Brother      Diabetes Brother          Passed away at 60     Hypertension Brother      Other Cancer Maternal Grandfather          Current Outpatient Medications   Medication Sig Dispense Refill     calcium carbonate (OS-ZACH) 500 MG tablet Take 1 tablet (500 mg) by mouth 2 times daily       Lactobacillus Acidophilus (ACIDOPHILUS LACTOBACILLUS) POWD        Omega-3 Fish Oil 500 MG capsule Take 1 capsule (500 mg) by mouth daily       omeprazole (PRILOSEC) 20 MG DR capsule Take 1 capsule (20 mg) by mouth 2 times daily       venlafaxine (EFFEXOR-XR) 75 MG 24 hr capsule Take 1 capsule (75 mg) by mouth daily 90 capsule 3     vitamin B-12 (CYANOCOBALAMIN) 50 MCG tablet Take 1 tablet (50 mcg) by mouth daily       vitamin D3 (CHOLECALCIFEROL) 1000 units (25 mcg) tablet Take by mouth daily       albuterol (PROAIR HFA) 108 (90 Base) MCG/ACT inhaler Inhale 2 puffs into the lungs every 6 hours as needed for shortness of breath / dyspnea 3 Inhaler 1     betamethasone dipropionate (DIPROSONE) 0.05 % external cream Apply to trunk, arms and legs bid for 10-14 consecutive days, Not to be used on face or groin 45 g 1     betamethasone dipropionate (DIPROSONE) 0.05 % lotion Apply to scalp at bedtime when needed 60 mL 1     hydrOXYzine (ATARAX) 25 MG tablet 1 tab at bedtime 90 tablet 3     LORazepam (ATIVAN) 0.5 MG tablet TAKE 1/2 TO 1 (ONE-HALF TO ONE) TABLET BY MOUTH EVERY 8 HOURS AS NEEDED FOR  ANXIETY. 15 tablet 0     Pneumonia Vaccine:Adults age 65+ who received their first dose of Pneumovax (PPSV23) prior to age 65 years: Should be given PCV 13 > 1 year after their most recent PPSV23 AND should be given a another dose of PPSV23 > 5 years after their most recent dose of PPSV23. Patient declined.      Shingles: Check with insurance    Review of Systems  CONSTITUTIONAL: NEGATIVE for fever, chills, Positive for recent weight loss which was purposeful with new dietary change  ENT/MOUTH: Positive for hearing loss in left ear, has history of deafness in right ear  BREAST:  "NEGATIVE for discharge, lumps, or lesions  RESP: NEGATIVE for significant cough or SOB  CV: NEGATIVE for chest pain, palpitations or peripheral edema  GI: NEGATIVE for nausea, abdominal pain, heartburn, or change in bowel habits  PSYCHIATRIC: Positive for increased anxiety related to new diagnosis of celiac's disease    OBJECTIVE:   /74 (BP Location: Right arm, Patient Position: Sitting, Cuff Size: Adult Regular)   Pulse 90   Temp 97.5  F (36.4  C) (Oral)   Ht 1.568 m (5' 1.75\")   Wt 74.4 kg (164 lb)   SpO2 97%   BMI 30.24 kg/m   Estimated body mass index is 30.24 kg/m  as calculated from the following:    Height as of this encounter: 1.568 m (5' 1.75\").    Weight as of this encounter: 74.4 kg (164 lb).  Physical Exam  GENERAL: healthy, alert and no acute distress  HENT: ear canals and TM's normal, nose and mouth without ulcers or lesions  RESP: lungs clear to auscultation - no rales, rhonchi or wheezes  CV: regular rate and rhythm, normal S1 S2, no S3 or S4, no murmur, click or rub, no peripheral edema  ABDOMEN: soft, nontender, no hepatosplenomegaly, no masses and bowel sounds active  PSYCH: mentation appears normal, affect normal/bright    Diagnostic Test Results:  No results found for this or any previous visit (from the past 24 hour(s)).   Awaiting lipid results, hepatic and vitamin D    ASSESSMENT / PLAN:   1. Celiac disease  Stable new diagnosis. Patient has new prescriptions listed below from [facility]. She is following a gluten free diet closely and has no concerns related to the diagnosis at this time.   - vitamin D3 (CHOLECALCIFEROL) 1000 units (25 mcg) tablet; Take by mouth daily  - Lactobacillus Acidophilus (ACIDOPHILUS LACTOBACILLUS) POWD  - calcium carbonate (OS-ZACH) 500 MG tablet; Take 1 tablet (500 mg) by mouth 2 times daily  - Omega-3 Fish Oil 500 MG capsule; Take 1 capsule (500 mg) by mouth daily  - vitamin B-12 (CYANOCOBALAMIN) 50 MCG tablet; Take 1 tablet (50 mcg) by mouth daily    2. " Duodenal ulcer without hemorrhage or perforation and without obstruction  Stable with the use of omeprazole and avoidance of NSAIDS.   - omeprazole (PRILOSEC) 20 MG DR capsule; Take 1 capsule (20 mg) by mouth 2 times daily    3. Asymptomatic menopause  Routine screening of bone density without a history of fractures.  - DX Hip/Pelvis/Spine; Future      4. Encounter for screening mammogram for malignant neoplasm of breast   Last mammogram was in 2017 which was negative, order placed for a repeat mammogram for this year.  - *MA Screening Digital Bilateral; Future    5. Anxiety  Unstable anxiety with new diagnosis of celiac's disease. It has been progressively getting better as time has passed and has an unused bottle of ativan at home. Will consider increasing effexor dosage if patient is requiring increased amounts of Lorazepam.    - venlafaxine (EFFEXOR-XR) 75 MG 24 hr capsule; Take 1 capsule (75 mg) by mouth daily  Dispense: 90 capsule; Refill: 3  ELEAZAR-7 SCORE 10/1/2018 7/2/2019 10/3/2019   Total Score - - -   Total Score - - -   Total Score 5 8 2     PHQ-9 SCORE 10/1/2018 7/2/2019 10/3/2019   PHQ-9 Total Score - - -   PHQ-9 Total Score MyChart - - -   PHQ-9 Total Score 5 9 5       6. Encounter for Medicare annual wellness exam  Updated routine health screenings    7. Screening for lipid disorders  Last lipids in 2018 elevated but ASCVD risk is low, will re-evaluate following new lipid results.  The 10-year ASCVD risk score (Daily YEUNG Jr., et al., 2013) is: 4.4%    Values used to calculate the score:      Age: 63 years      Sex: Female      Is Non- : No      Diabetic: No      Tobacco smoker: No      Systolic Blood Pressure: 108 mmHg      Is BP treated: No      HDL Cholesterol: 59 mg/dL      Total Cholesterol: 247 mg/dL    - Lipid panel reflex to direct LDL Fasting    8. Decreased hearing of both ears  Patient has a history of deafness in right ear and  has noted increased hearing  "difficulty in left ear, referred to audiology.  - AUDIOLOGY ADULT REFERRAL    End of Life Planning:  Patient currently has an advanced directive: Has one completed but needs to bring it in. Does not want a respiratory, no to blood transfusions,  is aware    COUNSELING:  Reviewed preventive health counseling, as reflected in patient instructions       Regular exercise       Hearing screening       Immunizations    Declined: Influenza and Pneumococcal due to Concerns about side effects/safety               Osteoporosis Prevention/Bone Health       Colon cancer screening    Estimated body mass index is 30.24 kg/m  as calculated from the following:    Height as of this encounter: 1.568 m (5' 1.75\").    Weight as of this encounter: 74.4 kg (164 lb).    Weight management plan: Discussed healthy diet and exercise guidelines     reports that she quit smoking about 45 years ago. Her smoking use included cigarettes. She started smoking about 50 years ago. She has a 5.00 pack-year smoking history. She has never used smokeless tobacco.      Appropriate preventive services were discussed with this patient, including applicable screening as appropriate for cardiovascular disease, diabetes, osteopenia/osteoporosis, and glaucoma.  As appropriate for age/gender, discussed screening for colorectal cancer, prostate cancer, breast cancer, and cervical cancer. Checklist reviewing preventive services available has been given to the patient.    Reviewed patients plan of care and provided an AVS. The Basic Care Plan (routine screening as documented in Health Maintenance) for Austin meets the Care Plan requirement. This Care Plan has been established and reviewed with the Patient.    Counseling Resources:  ATP IV Guidelines  Pooled Cohorts Equation Calculator  Breast Cancer Risk Calculator  FRAX Risk Assessment  ICSI Preventive Guidelines  Dietary Guidelines for Americans, 2010  USDA's MyPlate  ASA Prophylaxis  Lung CA " Screening      Sally Helm RN, Student FNP, [employer]  TANYA Tavera [facility]    Identified Health Risks:

## 2019-10-03 ENCOUNTER — ANCILLARY PROCEDURE (OUTPATIENT)
Dept: MAMMOGRAPHY | Facility: CLINIC | Age: 64
End: 2019-10-03
Payer: COMMERCIAL

## 2019-10-03 ENCOUNTER — OFFICE VISIT (OUTPATIENT)
Dept: FAMILY MEDICINE | Facility: CLINIC | Age: 64
End: 2019-10-03
Payer: COMMERCIAL

## 2019-10-03 VITALS
BODY MASS INDEX: 30.18 KG/M2 | HEART RATE: 90 BPM | DIASTOLIC BLOOD PRESSURE: 74 MMHG | TEMPERATURE: 97.5 F | SYSTOLIC BLOOD PRESSURE: 108 MMHG | HEIGHT: 62 IN | WEIGHT: 164 LBS | OXYGEN SATURATION: 97 %

## 2019-10-03 DIAGNOSIS — Z12.39 SCREENING FOR BREAST CANCER: ICD-10-CM

## 2019-10-03 DIAGNOSIS — Z13.220 SCREENING FOR LIPID DISORDERS: ICD-10-CM

## 2019-10-03 DIAGNOSIS — K26.9 DUODENAL ULCER WITHOUT HEMORRHAGE OR PERFORATION AND WITHOUT OBSTRUCTION: ICD-10-CM

## 2019-10-03 DIAGNOSIS — K90.0 CELIAC DISEASE: ICD-10-CM

## 2019-10-03 DIAGNOSIS — F41.9 ANXIETY: ICD-10-CM

## 2019-10-03 DIAGNOSIS — Z12.31 ENCOUNTER FOR SCREENING MAMMOGRAM FOR MALIGNANT NEOPLASM OF BREAST: ICD-10-CM

## 2019-10-03 DIAGNOSIS — K90.0 CELIAC DISEASE: Primary | ICD-10-CM

## 2019-10-03 DIAGNOSIS — Z78.0 ASYMPTOMATIC MENOPAUSE: ICD-10-CM

## 2019-10-03 DIAGNOSIS — H91.93 DECREASED HEARING OF BOTH EARS: ICD-10-CM

## 2019-10-03 DIAGNOSIS — Z00.00 ENCOUNTER FOR MEDICARE ANNUAL WELLNESS EXAM: Primary | ICD-10-CM

## 2019-10-03 PROCEDURE — 80061 LIPID PANEL: CPT | Performed by: NURSE PRACTITIONER

## 2019-10-03 PROCEDURE — 99214 OFFICE O/P EST MOD 30 MIN: CPT | Mod: 25 | Performed by: NURSE PRACTITIONER

## 2019-10-03 PROCEDURE — 99207 C PAF COMPLETED  NO CHARGE: CPT | Performed by: NURSE PRACTITIONER

## 2019-10-03 PROCEDURE — 36415 COLL VENOUS BLD VENIPUNCTURE: CPT | Performed by: NURSE PRACTITIONER

## 2019-10-03 PROCEDURE — 77067 SCR MAMMO BI INCL CAD: CPT | Mod: TC

## 2019-10-03 PROCEDURE — 80076 HEPATIC FUNCTION PANEL: CPT | Performed by: PHYSICIAN ASSISTANT

## 2019-10-03 PROCEDURE — 96127 BRIEF EMOTIONAL/BEHAV ASSMT: CPT | Performed by: NURSE PRACTITIONER

## 2019-10-03 PROCEDURE — 96127 BRIEF EMOTIONAL/BEHAV ASSMT: CPT | Mod: 59 | Performed by: NURSE PRACTITIONER

## 2019-10-03 PROCEDURE — 82306 VITAMIN D 25 HYDROXY: CPT | Performed by: PHYSICIAN ASSISTANT

## 2019-10-03 PROCEDURE — G0402 INITIAL PREVENTIVE EXAM: HCPCS | Performed by: NURSE PRACTITIONER

## 2019-10-03 RX ORDER — VENLAFAXINE HYDROCHLORIDE 75 MG/1
75 CAPSULE, EXTENDED RELEASE ORAL DAILY
Qty: 90 CAPSULE | Refills: 3 | Status: SHIPPED | OUTPATIENT
Start: 2019-10-03 | End: 2019-10-03

## 2019-10-03 RX ORDER — METAPROTERENOL SULFATE 10 MG
500 TABLET ORAL DAILY
COMMUNITY
Start: 2019-10-03 | End: 2021-07-05

## 2019-10-03 RX ORDER — MAGNESIUM OXIDE 400 MG/1
400 TABLET ORAL ONCE
Status: CANCELLED | OUTPATIENT
Start: 2019-10-03 | End: 2019-10-03

## 2019-10-03 RX ORDER — MULTIVITAMIN WITH IRON
50 TABLET ORAL DAILY
COMMUNITY
Start: 2019-10-03 | End: 2020-10-19

## 2019-10-03 RX ORDER — CALCIUM CARBONATE 500(1250)
1 TABLET ORAL 2 TIMES DAILY
COMMUNITY
Start: 2019-10-03 | End: 2021-07-05

## 2019-10-03 RX ORDER — LACTOBACILLUS ACIDOPHILUS
POWDER (GRAM) MISCELLANEOUS
COMMUNITY
Start: 2019-10-03 | End: 2021-07-06

## 2019-10-03 RX ORDER — VENLAFAXINE HYDROCHLORIDE 75 MG/1
75 CAPSULE, EXTENDED RELEASE ORAL DAILY
Qty: 90 CAPSULE | Refills: 3 | Status: SHIPPED | OUTPATIENT
Start: 2019-10-03 | End: 2020-05-26

## 2019-10-03 ASSESSMENT — ANXIETY QUESTIONNAIRES
IF YOU CHECKED OFF ANY PROBLEMS ON THIS QUESTIONNAIRE, HOW DIFFICULT HAVE THESE PROBLEMS MADE IT FOR YOU TO DO YOUR WORK, TAKE CARE OF THINGS AT HOME, OR GET ALONG WITH OTHER PEOPLE: NOT DIFFICULT AT ALL
3. WORRYING TOO MUCH ABOUT DIFFERENT THINGS: SEVERAL DAYS
GAD7 TOTAL SCORE: 2
6. BECOMING EASILY ANNOYED OR IRRITABLE: NOT AT ALL
1. FEELING NERVOUS, ANXIOUS, OR ON EDGE: SEVERAL DAYS
7. FEELING AFRAID AS IF SOMETHING AWFUL MIGHT HAPPEN: NOT AT ALL
5. BEING SO RESTLESS THAT IT IS HARD TO SIT STILL: NOT AT ALL
2. NOT BEING ABLE TO STOP OR CONTROL WORRYING: NOT AT ALL

## 2019-10-03 ASSESSMENT — MIFFLIN-ST. JEOR: SCORE: 1238.18

## 2019-10-03 ASSESSMENT — PATIENT HEALTH QUESTIONNAIRE - PHQ9
5. POOR APPETITE OR OVEREATING: NOT AT ALL
SUM OF ALL RESPONSES TO PHQ QUESTIONS 1-9: 5

## 2019-10-03 NOTE — LETTER
My Asthma Action Plan    Name: Austin Shaffer   YOB: 1955  Date: 10/3/2019   My doctor: TANYA Tavera CNP   My clinic: [facility] [place]        My Rescue Medicine:   Albuterol inhaler (Proair/Ventolin/Proventil HFA)  2-4 puffs EVERY 4 HOURS as needed. Use a spacer if recommended by your provider.   My Asthma Severity:   Intermittent / Exercise Induced  Know your asthma triggers: exercise or sports  None          GREEN ZONE   Good Control    I feel good    No cough or wheeze    Can work, sleep and play without asthma symptoms       Take your asthma control medicine every day.     1. If exercise triggers your asthma, take your rescue medication    15 minutes before exercise or sports, and    During exercise if you have asthma symptoms  2. Spacer to use with inhaler: If you have a spacer, make sure to use it with your inhaler             YELLOW ZONE Getting Worse  I have ANY of these:    I do not feel good    Cough or wheeze    Chest feels tight    Wake up at night   1. Keep taking your Green Zone medications  2. Start taking your rescue medicine:    every 20 minutes for up to 1 hour. Then every 4 hours for 24-48 hours.  3. If you stay in the Yellow Zone for more than 12-24 hours, contact your doctor.  4. If you do not return to the Green Zone in 12-24 hours or you get worse, start taking your oral steroid medicine if prescribed by your provider.           RED ZONE Medical Alert - Get Help  I have ANY of these:    I feel awful    Medicine is not helping    Breathing getting harder    Trouble walking or talking    Nose opens wide to breathe       1. Take your rescue medicine NOW  2. If your provider has prescribed an oral steroid medicine, start taking it NOW  3. Call your doctor NOW  4. If you are still in the Red Zone after 20 minutes and you have not reached your doctor:    Take your rescue medicine again and    Call 911 or go to the emergency room right away    See your regular  doctor within 2 weeks of an Emergency Room or Urgent Care visit for follow-up treatment.          Annual Reminders:  Meet with Asthma Educator,  Flu Shot in the Fall, consider Pneumonia Vaccination for patients with asthma (aged 19 and older).    Pharmacy:    [id number] - [place], [place] - [address] [facility] [address] [address] [facility] [address] [id number] - [place], [place] - [address] [facility] [address] [facility]  [facility] - [place], [place] - [address] [facility] - [place], [place] - [address] [facility] - [place], [place] - [address]                        Asthma Triggers  How To Control Things That Make Your Asthma Worse    Triggers are things that make your asthma worse.  Look at the list below to help you find your triggers and   what you can do about them. You can help prevent asthma flare-ups by staying away from your triggers.      Trigger                                                          What you can do   Cigarette Smoke  Tobacco smoke can make asthma worse. Do not allow smoking in your home, car or around you.  Be sure no one smokes at a child s day care or school.  If you smoke, ask your health care provider for ways to help you quit.  Ask family members to quit too.  Ask your health care provider for a referral to Quit Plan to help you quit smoking, or call 6-633-522-PLAN.     Colds, Flu, Bronchitis  These are common triggers of asthma. Wash your hands often.  Don t touch your eyes, nose or mouth.  Get a flu shot every year.     Dust Mites  These are tiny bugs that live in cloth or carpet. They are too small to see. Wash sheets and blankets in hot water every week.   Encase pillows and mattress in dust mite proof covers.  Avoid having carpet if you can. If you have carpet, vacuum weekly.   Use a dust mask and HEPA vacuum.   Pollen and Outdoor Mold  Some people are allergic to trees, grass, or weed  pollen, or molds. Try to keep your windows closed.  Limit time out doors when pollen count is high.   Ask you health care provider about taking medicine during allergy season.     Animal Dander  Some people are allergic to skin flakes, urine or saliva from pets with fur or feathers. Keep pets with fur or feathers out of your home.    If you can t keep the pet outdoors, then keep the pet out of your bedroom.  Keep the bedroom door closed.  Keep pets off cloth furniture and away from stuffed toys.     Mice, Rats, and Cockroaches  Some people are allergic to the waste from these pests.   Cover food and garbage.  Clean up spills and food crumbs.  Store grease in the refrigerator.   Keep food out of the bedroom.   Indoor Mold  This can be a trigger if your home has high moisture. Fix leaking faucets, pipes, or other sources of water.   Clean moldy surfaces.  Dehumidify basement if it is damp and smelly.   Smoke, Strong Odors, and Sprays  These can reduce air quality. Stay away from strong odors and sprays, such as perfume, powder, hair spray, paints, smoke incense, paint, cleaning products, candles and new carpet.   Exercise or Sports  Some people with asthma have this trigger. Be active!  Ask your doctor about taking medicine before sports or exercise to prevent symptoms.    Warm up for 5-10 minutes before and after sports or exercise.     Other Triggers of Asthma  Cold air:  Cover your nose and mouth with a scarf.  Sometimes laughing or crying can be a trigger.  Some medicines and food can trigger asthma.

## 2019-10-04 LAB
ALBUMIN SERPL-MCNC: 3.9 G/DL (ref 3.4–5)
ALP SERPL-CCNC: 108 U/L (ref 40–150)
ALT SERPL W P-5'-P-CCNC: 32 U/L (ref 0–50)
AST SERPL W P-5'-P-CCNC: 23 U/L (ref 0–45)
BILIRUB DIRECT SERPL-MCNC: 0.1 MG/DL (ref 0–0.2)
BILIRUB SERPL-MCNC: 0.7 MG/DL (ref 0.2–1.3)
CHOLEST SERPL-MCNC: 246 MG/DL
DEPRECATED CALCIDIOL+CALCIFEROL SERPL-MC: 29 UG/L (ref 20–75)
HDLC SERPL-MCNC: 55 MG/DL
LDLC SERPL CALC-MCNC: 176 MG/DL
NONHDLC SERPL-MCNC: 191 MG/DL
PROT SERPL-MCNC: 7.4 G/DL (ref 6.8–8.8)
TRIGL SERPL-MCNC: 77 MG/DL

## 2019-10-04 ASSESSMENT — ANXIETY QUESTIONNAIRES: GAD7 TOTAL SCORE: 2

## 2019-10-04 NOTE — RESULT ENCOUNTER NOTE
Dear Austin,     -Mammogram was normal.  ADVISE: rechecking in 1 year.      Please send a VersionOne message or call [phone]  if you have any questions.      TANYA Tavera, CNP  [place] [facility] [place]    If you have further questions about the interpretation of your labs, [email or web address] is a good website to check out for further information.

## 2019-10-04 NOTE — RESULT ENCOUNTER NOTE
Dear Austin,     -LDL(bad) cholesterol level is elevated which can increase your heart disease risk.  A diet high in fat and simple carbohydrates, genetics and being overweight can contribute to this. ADVISE: exercising 150 minutes of aerobic exercise per week (30 minutes for 5 days per week or 50 minutes for 3 days per week are options) and eating a low saturated fat/low carbohydrate diet are helpful to improve this. In 12 months, you should recheck your fasting cholesterol panel.        Please send a Paper[employer]ven message or call [phone]  if you have any questions.      Dunia Desai, TANYA, CNP  [place] [facility] [place]    If you have further questions about the interpretation of your labs, [email or web address] is a good website to check out for further information.

## 2019-10-05 ENCOUNTER — MYC MEDICAL ADVICE (OUTPATIENT)
Dept: FAMILY MEDICINE | Facility: CLINIC | Age: 64
End: 2019-10-05

## 2019-10-15 ENCOUNTER — E-VISIT (OUTPATIENT)
Dept: FAMILY MEDICINE | Facility: CLINIC | Age: 64
End: 2019-10-15
Payer: COMMERCIAL

## 2019-10-15 DIAGNOSIS — F41.9 ANXIETY: ICD-10-CM

## 2019-10-15 PROCEDURE — 99444 ZZC PHYSICIAN ONLINE EVALUATION & MANAGEMENT SERVICE: CPT | Performed by: NURSE PRACTITIONER

## 2019-10-15 RX ORDER — LORAZEPAM 0.5 MG/1
TABLET ORAL
Qty: 30 TABLET | Refills: 0 | Status: SHIPPED | OUTPATIENT
Start: 2019-10-15 | End: 2020-01-10

## 2019-10-15 ASSESSMENT — ANXIETY QUESTIONNAIRES
3. WORRYING TOO MUCH ABOUT DIFFERENT THINGS: MORE THAN HALF THE DAYS
6. BECOMING EASILY ANNOYED OR IRRITABLE: SEVERAL DAYS
GAD7 TOTAL SCORE: 7
1. FEELING NERVOUS, ANXIOUS, OR ON EDGE: SEVERAL DAYS
7. FEELING AFRAID AS IF SOMETHING AWFUL MIGHT HAPPEN: NOT AT ALL
2. NOT BEING ABLE TO STOP OR CONTROL WORRYING: SEVERAL DAYS
GAD7 TOTAL SCORE: 7
7. FEELING AFRAID AS IF SOMETHING AWFUL MIGHT HAPPEN: NOT AT ALL
5. BEING SO RESTLESS THAT IT IS HARD TO SIT STILL: SEVERAL DAYS
4. TROUBLE RELAXING: SEVERAL DAYS

## 2019-10-16 ASSESSMENT — ANXIETY QUESTIONNAIRES: GAD7 TOTAL SCORE: 7

## 2019-10-18 ENCOUNTER — HOSPITAL ENCOUNTER (OUTPATIENT)
Dept: BONE DENSITY | Facility: CLINIC | Age: 64
Discharge: HOME | End: 2019-10-18
Attending: NURSE PRACTITIONER | Admitting: NURSE PRACTITIONER
Payer: COMMERCIAL

## 2019-10-18 DIAGNOSIS — Z78.0 ASYMPTOMATIC MENOPAUSE: ICD-10-CM

## 2019-10-18 PROCEDURE — 77080 DXA BONE DENSITY AXIAL: CPT

## 2019-10-21 PROBLEM — M81.0 AGE-RELATED OSTEOPOROSIS WITHOUT CURRENT PATHOLOGICAL FRACTURE: Status: ACTIVE | Noted: 2019-10-21

## 2019-10-21 NOTE — RESULT ENCOUNTER NOTE
Ms. Shaffer,    Dexa shows osteoporosis. Make sure taking calcium 1200mg daily and vitamin D 800 IU daily. Follow-up to discuss other treatment options including a medication taken weekly to improve your bone density.  An face-to-face office visit, E-visit or telephone visit with Dunia are all options for this.  Please follow up with her as osteoporosis puts you at increased risk for fracture as the result outlines above.    If you have further questions about the interpretation of your labs, [email or web address] is a good website to check out for further information.    Please contact the clinic if you have additional questions.  Thank you.    Sincerely,    Reji Patterson MD

## 2019-10-29 ENCOUNTER — MYC MEDICAL ADVICE (OUTPATIENT)
Dept: FAMILY MEDICINE | Facility: CLINIC | Age: 64
End: 2019-10-29

## 2019-12-18 ENCOUNTER — TRANSFERRED RECORDS (OUTPATIENT)
Dept: HEALTH INFORMATION MANAGEMENT | Facility: CLINIC | Age: 64
End: 2019-12-18

## 2020-01-08 DIAGNOSIS — F41.9 ANXIETY: ICD-10-CM

## 2020-01-08 NOTE — TELEPHONE ENCOUNTER
Routing refill request to provider for review/approval because:  Drug not on the [facility] refill protocol     Shelly Dillon R.N.

## 2020-01-08 NOTE — TELEPHONE ENCOUNTER
Requested Prescriptions   Pending Prescriptions Disp Refills     LORazepam (ATIVAN) 0.5 MG tablet [Pharmacy Med Name: LORazepam 0.5 MG  Last Written Prescription Date:  10/15/19  Last Fill Quantity: 30,  # refills: 0   Last Office Visit: 10/3/2019   Future Office Visit:      Oral Tablet]  0     Sig: TAKE 1/2 TO 1 (ONE-HALF TO ONE) TABLET BY MOUTH EVERY 8 HOURS AS NEEDED FOR  ANXIETY.       There is no refill protocol information for this order

## 2020-01-10 RX ORDER — LORAZEPAM 0.5 MG/1
.25-.5 TABLET ORAL EVERY 8 HOURS PRN
Qty: 30 TABLET | Refills: 0 | Status: SHIPPED | OUTPATIENT
Start: 2020-01-10 | End: 2020-03-24

## 2020-03-23 DIAGNOSIS — F41.9 ANXIETY: ICD-10-CM

## 2020-03-23 NOTE — TELEPHONE ENCOUNTER
LORazepam (ATIVAN) 0.5 MG tablet      Last Written Prescription Date:  1/10/2020  Last Fill Quantity: 30 tablet,   # refills: 0  Last Office Visit: 10/3/2019 [place]    Future Office visit:       Routing refill request to provider for review/approval because:  Drug not on the [facility], [facility] or [facility] refill protocol or controlled substance

## 2020-03-24 RX ORDER — LORAZEPAM 0.5 MG/1
TABLET ORAL
Qty: 30 TABLET | Refills: 0 | Status: SHIPPED | OUTPATIENT
Start: 2020-03-24 | End: 2020-04-29

## 2020-04-28 DIAGNOSIS — F41.9 ANXIETY: ICD-10-CM

## 2020-04-29 ENCOUNTER — MYC MEDICAL ADVICE (OUTPATIENT)
Dept: FAMILY MEDICINE | Facility: CLINIC | Age: 65
End: 2020-04-29

## 2020-04-29 RX ORDER — LORAZEPAM 0.5 MG/1
TABLET ORAL
Qty: 30 TABLET | Refills: 0 | Status: SHIPPED | OUTPATIENT
Start: 2020-04-29 | End: 2020-08-12

## 2020-04-29 ASSESSMENT — ANXIETY QUESTIONNAIRES
7. FEELING AFRAID AS IF SOMETHING AWFUL MIGHT HAPPEN: NOT AT ALL
1. FEELING NERVOUS, ANXIOUS, OR ON EDGE: MORE THAN HALF THE DAYS
GAD7 TOTAL SCORE: 7
3. WORRYING TOO MUCH ABOUT DIFFERENT THINGS: SEVERAL DAYS
GAD7 TOTAL SCORE: 7
7. FEELING AFRAID AS IF SOMETHING AWFUL MIGHT HAPPEN: NOT AT ALL
4. TROUBLE RELAXING: SEVERAL DAYS
2. NOT BEING ABLE TO STOP OR CONTROL WORRYING: SEVERAL DAYS
GAD7 TOTAL SCORE: 7
5. BEING SO RESTLESS THAT IT IS HARD TO SIT STILL: SEVERAL DAYS
6. BECOMING EASILY ANNOYED OR IRRITABLE: SEVERAL DAYS

## 2020-04-29 ASSESSMENT — PATIENT HEALTH QUESTIONNAIRE - PHQ9
SUM OF ALL RESPONSES TO PHQ QUESTIONS 1-9: 9
10. IF YOU CHECKED OFF ANY PROBLEMS, HOW DIFFICULT HAVE THESE PROBLEMS MADE IT FOR YOU TO DO YOUR WORK, TAKE CARE OF THINGS AT HOME, OR GET ALONG WITH OTHER PEOPLE: SOMEWHAT DIFFICULT
SUM OF ALL RESPONSES TO PHQ QUESTIONS 1-9: 9

## 2020-04-29 NOTE — TELEPHONE ENCOUNTER
Requested Prescriptions   Pending Prescriptions Disp Refills     LORazepam (ATIVAN) 0.5 MG tablet [Pharmacy Med Name: LORazepam 0.5 MG Oral Tablet] 30 tablet 0     Sig: TAKE 1/2 TO 1 (ONE-HALF TO ONE)  TABLET BY MOUTH EVERY 8 HOURS AS NEEDED FOR ANXIETY       There is no refill protocol information for this order      RX monitoring program ([place]) reviewed:  reviewed- no concerns    [place]:  [email or web address]    Controlled Substance Refill Request for Lorazepam  Problem List Complete:  No     PROVIDER TO CONSIDER COMPLETION OF PROBLEM LIST AND OVERVIEW/CONTROLLED SUBSTANCE AGREEMENT    Last Written Prescription Date:  3/24/20  Last Fill Quantity: 30,   # refills: 0    THE MOST RECENT OFFICE VISIT MUST BE WITHIN THE PAST 3 MONTHS. AT LEAST ONE FACE TO FACE VISIT MUST OCCUR EVERY 6 MONTHS. ADDITIONAL VISITS CAN BE VIRTUAL.  (THIS STATEMENT SHOULD BE DELETED.)    Last Office Visit with [facility] primary care provider: 10/3/2019    Future Office visit:     Controlled substance agreement:   Encounter-Level CSA:    There are no encounter-level csa.     Patient-Level CSA:    There are no patient-level csa.         Last Urine Drug Screen: No results found for: CDAUT, No results found for: COMDAT, No results found for: THC13, PCP13, COC13, MAMP13, OPI13, AMP13, BZO13, TCA13, MTD13, BAR13, OXY13, PPX13, BUP13     Processing:  Rx to be electronically transmitted to pharmacy by provider      [email or web address]       checked in past 3 months?  Yes today 4/29/20, no concerns    Last office visit: 10/3/2019 with prescribing provider:  Dunia Desai, TANYA CNP,     Future Office Visit:None    Shelly Dillon R.N.

## 2020-04-30 ASSESSMENT — ANXIETY QUESTIONNAIRES: GAD7 TOTAL SCORE: 7

## 2020-04-30 ASSESSMENT — PATIENT HEALTH QUESTIONNAIRE - PHQ9: SUM OF ALL RESPONSES TO PHQ QUESTIONS 1-9: 9

## 2020-05-11 ENCOUNTER — MYC MEDICAL ADVICE (OUTPATIENT)
Dept: FAMILY MEDICINE | Facility: CLINIC | Age: 65
End: 2020-05-11

## 2020-05-22 ENCOUNTER — E-VISIT (OUTPATIENT)
Dept: FAMILY MEDICINE | Facility: CLINIC | Age: 65
End: 2020-05-22
Payer: COMMERCIAL

## 2020-05-22 DIAGNOSIS — F41.9 ANXIETY: ICD-10-CM

## 2020-05-22 PROCEDURE — 99421 OL DIG E/M SVC 5-10 MIN: CPT | Performed by: NURSE PRACTITIONER

## 2020-05-22 ASSESSMENT — ANXIETY QUESTIONNAIRES
GAD7 TOTAL SCORE: 8
GAD7 TOTAL SCORE: 8
1. FEELING NERVOUS, ANXIOUS, OR ON EDGE: SEVERAL DAYS
7. FEELING AFRAID AS IF SOMETHING AWFUL MIGHT HAPPEN: SEVERAL DAYS
5. BEING SO RESTLESS THAT IT IS HARD TO SIT STILL: SEVERAL DAYS
2. NOT BEING ABLE TO STOP OR CONTROL WORRYING: SEVERAL DAYS
GAD7 TOTAL SCORE: 8
6. BECOMING EASILY ANNOYED OR IRRITABLE: SEVERAL DAYS
4. TROUBLE RELAXING: SEVERAL DAYS
7. FEELING AFRAID AS IF SOMETHING AWFUL MIGHT HAPPEN: SEVERAL DAYS
3. WORRYING TOO MUCH ABOUT DIFFERENT THINGS: MORE THAN HALF THE DAYS

## 2020-05-22 NOTE — TELEPHONE ENCOUNTER
PHQ 10/3/2019 4/29/2020 5/22/2020   PHQ-9 Total Score 5 9 9   Q9: Thoughts of better off dead/self-harm past 2 weeks Not at all Not at all Not at all     ELEAZAR-7 SCORE 10/15/2019 4/29/2020 5/22/2020   Total Score - - -   Total Score 7 (mild anxiety) 7 (mild anxiety) 8 (mild anxiety)   Total Score 7 7 8             Provider E-Visit time total (minutes): 8

## 2020-05-23 ASSESSMENT — PATIENT HEALTH QUESTIONNAIRE - PHQ9: SUM OF ALL RESPONSES TO PHQ QUESTIONS 1-9: 9

## 2020-05-23 ASSESSMENT — ANXIETY QUESTIONNAIRES: GAD7 TOTAL SCORE: 8

## 2020-05-27 ENCOUNTER — MYC MEDICAL ADVICE (OUTPATIENT)
Dept: FAMILY MEDICINE | Facility: CLINIC | Age: 65
End: 2020-05-27

## 2020-05-28 RX ORDER — VENLAFAXINE HYDROCHLORIDE 150 MG/1
150 CAPSULE, EXTENDED RELEASE ORAL DAILY
Qty: 90 CAPSULE | Refills: 1 | Status: SHIPPED | OUTPATIENT
Start: 2020-05-28 | End: 2020-10-19

## 2020-06-17 ENCOUNTER — OFFICE VISIT (OUTPATIENT)
Dept: URGENT CARE | Facility: URGENT CARE | Age: 65
End: 2020-06-17
Payer: COMMERCIAL

## 2020-06-17 ENCOUNTER — TELEPHONE (OUTPATIENT)
Dept: FAMILY MEDICINE | Facility: CLINIC | Age: 65
End: 2020-06-17

## 2020-06-17 ENCOUNTER — E-VISIT (OUTPATIENT)
Dept: FAMILY MEDICINE | Facility: CLINIC | Age: 65
End: 2020-06-17

## 2020-06-17 VITALS
TEMPERATURE: 99.4 F | BODY MASS INDEX: 30.74 KG/M2 | OXYGEN SATURATION: 98 % | RESPIRATION RATE: 12 BRPM | SYSTOLIC BLOOD PRESSURE: 110 MMHG | DIASTOLIC BLOOD PRESSURE: 80 MMHG | HEART RATE: 97 BPM | WEIGHT: 166.7 LBS

## 2020-06-17 DIAGNOSIS — R39.89 URINARY PROBLEM: Primary | ICD-10-CM

## 2020-06-17 DIAGNOSIS — R82.90 NONSPECIFIC FINDING ON EXAMINATION OF URINE: ICD-10-CM

## 2020-06-17 DIAGNOSIS — R39.9 UTI SYMPTOMS: ICD-10-CM

## 2020-06-17 DIAGNOSIS — N30.01 ACUTE CYSTITIS WITH HEMATURIA: Primary | ICD-10-CM

## 2020-06-17 LAB
ALBUMIN UR-MCNC: 30 MG/DL
APPEARANCE UR: CLEAR
BACTERIA #/AREA URNS HPF: ABNORMAL /HPF
BILIRUB UR QL STRIP: NEGATIVE
COLOR UR AUTO: YELLOW
GLUCOSE UR STRIP-MCNC: NEGATIVE MG/DL
HGB UR QL STRIP: ABNORMAL
KETONES UR STRIP-MCNC: ABNORMAL MG/DL
LEUKOCYTE ESTERASE UR QL STRIP: ABNORMAL
NITRATE UR QL: NEGATIVE
NON-SQ EPI CELLS #/AREA URNS LPF: ABNORMAL /LPF
PH UR STRIP: 5.5 PH (ref 5–7)
RBC #/AREA URNS AUTO: ABNORMAL /HPF
SOURCE: ABNORMAL
SP GR UR STRIP: 1.01 (ref 1–1.03)
UROBILINOGEN UR STRIP-ACNC: 0.2 EU/DL (ref 0.2–1)
WBC #/AREA URNS AUTO: ABNORMAL /HPF

## 2020-06-17 PROCEDURE — 87186 SC STD MICRODIL/AGAR DIL: CPT | Performed by: NURSE PRACTITIONER

## 2020-06-17 PROCEDURE — 87088 URINE BACTERIA CULTURE: CPT | Performed by: NURSE PRACTITIONER

## 2020-06-17 PROCEDURE — 81001 URINALYSIS AUTO W/SCOPE: CPT | Performed by: NURSE PRACTITIONER

## 2020-06-17 PROCEDURE — 87086 URINE CULTURE/COLONY COUNT: CPT | Performed by: NURSE PRACTITIONER

## 2020-06-17 PROCEDURE — 99207 ZZC NON-BILLABLE SERV PER CHARTING: CPT | Performed by: NURSE PRACTITIONER

## 2020-06-17 PROCEDURE — 99213 OFFICE O/P EST LOW 20 MIN: CPT | Performed by: NURSE PRACTITIONER

## 2020-06-17 RX ORDER — CEPHALEXIN 500 MG/1
500 CAPSULE ORAL 2 TIMES DAILY
Qty: 14 CAPSULE | Refills: 0 | Status: SHIPPED | OUTPATIENT
Start: 2020-06-17 | End: 2020-06-24

## 2020-06-17 NOTE — PATIENT INSTRUCTIONS
PREVENTION OF URINARY TRACT INFECTION    AVOID CHEMICAL IRRITTANTS: bath gels,  Perfumed products,  Deoderant pads or tampons, douching    CLOTHING that increases moisture and bacterial growth:  Nylon, lycra, pantihose, pantiliners     AVOID: tight clothing and thongs    ACIDIFY URINE: cranberry tablets instead of cranberry juice (with excess sugar) to acidify urine and decrease bacterial growth.     URINATE after intercourse    FLUIDS: 6-8 glasses water per day      Try AZO from pharmacy for pain.

## 2020-06-17 NOTE — TELEPHONE ENCOUNTER
Patient calling with UTI symptoms, cloudy, painful urine, going often. Patient requested an evisit but no directions given. She would like to know if she needs an appt? Labs? Please advise  [phone] (home)   Preferred pharmacy: [facility], [place]  Thank you  An Le

## 2020-06-17 NOTE — TELEPHONE ENCOUNTER
I called Austin. Advised I do see her e-visit but provider does have up to one business day to respond. She says she is in pain and would like something faster. Suggested [facility]. I stayed on phone with her until she was able to find this on her computer. . She is going to start a visit. Shelly Dillon R.N.

## 2020-06-17 NOTE — PROGRESS NOTES
SUBJECTIVE:   Austin Shaffer is a 64 year old female presenting with a chief complaint of burning and increased frequency of urination for the last 3 days.    Onset of symptoms was 3 day(s) ago.  Course of illness is worsening.    Severity moderate  Previous Treatment none      Past Medical History:   Diagnosis Date     Depressive disorder      Mild intermittent asthma      Current Outpatient Medications   Medication Sig Dispense Refill     albuterol (PROAIR HFA) 108 (90 Base) MCG/ACT inhaler Inhale 2 puffs into the lungs every 6 hours as needed for shortness of breath / dyspnea 3 Inhaler 1     betamethasone dipropionate (DIPROSONE) 0.05 % external cream Apply to trunk, arms and legs bid for 10-14 consecutive days, Not to be used on face or groin 45 g 1     betamethasone dipropionate (DIPROSONE) 0.05 % lotion Apply to scalp at bedtime when needed 60 mL 1     calcium carbonate (OS-ZACH) 500 MG tablet Take 1 tablet (500 mg) by mouth 2 times daily       cephALEXin (KEFLEX) 500 MG capsule Take 1 capsule (500 mg) by mouth 2 times daily for 7 days 14 capsule 0     hydrOXYzine (ATARAX) 25 MG tablet 1 tab at bedtime 90 tablet 3     Lactobacillus Acidophilus (ACIDOPHILUS LACTOBACILLUS) POWD        LORazepam (ATIVAN) 0.5 MG tablet TAKE 1/2 TO 1 (ONE-HALF TO ONE)  TABLET BY MOUTH EVERY 8 HOURS AS NEEDED FOR ANXIETY 30 tablet 0     Omega-3 Fish Oil 500 MG capsule Take 1 capsule (500 mg) by mouth daily       omeprazole (PRILOSEC) 20 MG DR capsule Take 1 capsule (20 mg) by mouth 2 times daily       venlafaxine (EFFEXOR-XR) 150 MG 24 hr capsule Take 1 capsule (150 mg) by mouth daily 90 capsule 1     vitamin B-12 (CYANOCOBALAMIN) 50 MCG tablet Take 1 tablet (50 mcg) by mouth daily       vitamin D3 (CHOLECALCIFEROL) 1000 units (25 mcg) tablet Take by mouth daily       Social History     Tobacco Use     Smoking status: Former Smoker     Packs/day: 1.00     Years: 5.00     Pack years: 5.00     Types: Cigarettes     Start date:  1969     Last attempt to quit: 3/20/1974     Years since quittin.2     Smokeless tobacco: Never Used   Substance Use Topics     Alcohol use: Yes     Alcohol/week: 0.0 standard drinks     Comment: About 3-4 a [facility]     Family History   Problem Relation Age of Onset     Cardiovascular Mother      Hypertension Mother      Cardiovascular Maternal Grandmother      Cancer Father         Kidney     Other Cancer Father      Myocardial Infarction Brother 60        2017     Cerebrovascular Disease Brother 63        2017     Mental Illness Brother      Diabetes Brother      Myocardial Infarction Brother      Hypertension Brother      Cerebrovascular Disease Brother      Diabetes Brother         Passed away at 60     Hypertension Brother      Other Cancer Maternal Grandfather         ROS: 10 point ROS neg other than the symptoms noted above in the HPI.     OBJECTIVE  /80 (BP Location: Right arm, Patient Position: Sitting, Cuff Size: Adult Large)   Pulse 97   Temp 99.4  F (37.4  C) (Oral)   Resp 12   Wt 75.6 kg (166 lb 11.2 oz)   SpO2 98%   Breastfeeding No   BMI 30.74 kg/m          GENERAL APPEARANCE: healthy appearing, alert     ABDOMEN:  soft, nontender, no HSM or masses and bowel sounds normal     MS: extremities normal- no gross deformities noted; normal muscle tone.     SKIN: no suspicious lesions or rashes     PSYCH: normal thought process; no significant mood disturbance      Labs:  Results for orders placed or performed in visit on 20 (from the past 24 hour(s))   *UA reflex to Microscopic and Culture ([place] and [facility] (except [place] and [place])    Specimen: Midstream Urine   Result Value Ref Range    Color Urine Yellow     Appearance Urine Clear     Glucose Urine Negative NEG^Negative mg/dL    Bilirubin Urine Negative NEG^Negative    Ketones Urine Trace (A) NEG^Negative mg/dL    Specific Gravity Urine 1.010 1.003 - 1.035    Blood Urine Moderate (A) NEG^Negative    pH Urine  5.5 5.0 - 7.0 pH    Protein Albumin Urine 30 (A) NEG^Negative mg/dL    Urobilinogen Urine 0.2 0.2 - 1.0 EU/dL    Nitrite Urine Negative NEG^Negative    Leukocyte Esterase Urine Moderate (A) NEG^Negative    Source Midstream Urine    Urine Microscopic   Result Value Ref Range    WBC Urine  (A) OTO5^0 - 5 /HPF    RBC Urine O - 2 OTO2^O - 2 /HPF    Squamous Epithelial /LPF Urine Few FEW^Few /LPF    Bacteria Urine Many (A) NEG^Negative /HPF         ASSESSMENT/PLAN:      ICD-10-CM    1. Acute cystitis with hematuria  N30.01 cephALEXin (KEFLEX) 500 MG capsule   2. UTI symptoms  R39.9 *UA reflex to Microscopic and Culture ([place] and [facility] (except [place] and [place])     Urine Microscopic     Urine Culture Aerobic Bacterial   3. Nonspecific finding on examination of urine  R82.90 Urine Culture Aerobic Bacterial        Follow Up:      Patient Instructions   PREVENTION OF URINARY TRACT INFECTION    AVOID CHEMICAL IRRITTANTS: bath gels,  Perfumed products,  Deoderant pads or tampons, douching    CLOTHING that increases moisture and bacterial growth:  Nylon, lycra, pantihose, pantiliners     AVOID: tight clothing and thongs    ACIDIFY URINE: cranberry tablets instead of cranberry juice (with excess sugar) to acidify urine and decrease bacterial growth.     URINATE after intercourse    FLUIDS: 6-8 glasses water per day      Try AZO from pharmacy for pain.      TANYA Boyd, CNP  [facility] Provider

## 2020-06-17 NOTE — TELEPHONE ENCOUNTER
The patient is calling back saying she is in a lot of pain and needs to know what to do for her UTI. She would like a call back at [phone].      Swathi Valenzuela  Patient Representative

## 2020-06-19 LAB
BACTERIA SPEC CULT: ABNORMAL
BACTERIA SPEC CULT: ABNORMAL
SPECIMEN SOURCE: ABNORMAL

## 2020-08-11 DIAGNOSIS — F41.9 ANXIETY: ICD-10-CM

## 2020-08-11 NOTE — TELEPHONE ENCOUNTER
Ativan      Last Written Prescription Date:  4/29/20  Last Fill Quantity: 30,   # refills: 0  Last Office Visit: 6/17/20  Future Office visit:       Routing refill request to provider for review/approval because:  Drug not on the G, P or [facility] refill protocol or controlled substance  IVY Hanley, RN  [facility]

## 2020-08-12 RX ORDER — LORAZEPAM 0.5 MG/1
TABLET ORAL
Qty: 30 TABLET | Refills: 0 | Status: SHIPPED | OUTPATIENT
Start: 2020-08-12 | End: 2020-11-10

## 2020-09-01 ENCOUNTER — TRANSFERRED RECORDS (OUTPATIENT)
Dept: HEALTH INFORMATION MANAGEMENT | Facility: CLINIC | Age: 65
End: 2020-09-01

## 2020-09-10 ENCOUNTER — TRANSFERRED RECORDS (OUTPATIENT)
Dept: HEALTH INFORMATION MANAGEMENT | Facility: CLINIC | Age: 65
End: 2020-09-10

## 2020-10-10 ENCOUNTER — MYC MEDICAL ADVICE (OUTPATIENT)
Dept: FAMILY MEDICINE | Facility: CLINIC | Age: 65
End: 2020-10-10

## 2020-10-13 ENCOUNTER — TRANSFERRED RECORDS (OUTPATIENT)
Dept: HEALTH INFORMATION MANAGEMENT | Facility: CLINIC | Age: 65
End: 2020-10-13

## 2020-10-17 ASSESSMENT — ANXIETY QUESTIONNAIRES
GAD7 TOTAL SCORE: 5
1. FEELING NERVOUS, ANXIOUS, OR ON EDGE: SEVERAL DAYS
GAD7 TOTAL SCORE: 5
7. FEELING AFRAID AS IF SOMETHING AWFUL MIGHT HAPPEN: NOT AT ALL
6. BECOMING EASILY ANNOYED OR IRRITABLE: NOT AT ALL
3. WORRYING TOO MUCH ABOUT DIFFERENT THINGS: SEVERAL DAYS
2. NOT BEING ABLE TO STOP OR CONTROL WORRYING: SEVERAL DAYS
7. FEELING AFRAID AS IF SOMETHING AWFUL MIGHT HAPPEN: NOT AT ALL
4. TROUBLE RELAXING: SEVERAL DAYS
5. BEING SO RESTLESS THAT IT IS HARD TO SIT STILL: SEVERAL DAYS
GAD7 TOTAL SCORE: 5

## 2020-10-17 ASSESSMENT — PATIENT HEALTH QUESTIONNAIRE - PHQ9
SUM OF ALL RESPONSES TO PHQ QUESTIONS 1-9: 8
10. IF YOU CHECKED OFF ANY PROBLEMS, HOW DIFFICULT HAVE THESE PROBLEMS MADE IT FOR YOU TO DO YOUR WORK, TAKE CARE OF THINGS AT HOME, OR GET ALONG WITH OTHER PEOPLE: NOT DIFFICULT AT ALL
SUM OF ALL RESPONSES TO PHQ QUESTIONS 1-9: 8

## 2020-10-18 ASSESSMENT — ANXIETY QUESTIONNAIRES: GAD7 TOTAL SCORE: 5

## 2020-10-19 ENCOUNTER — VIRTUAL VISIT (OUTPATIENT)
Dept: FAMILY MEDICINE | Facility: CLINIC | Age: 65
End: 2020-10-19
Payer: COMMERCIAL

## 2020-10-19 DIAGNOSIS — F33.1 MODERATE RECURRENT MAJOR DEPRESSION (H): ICD-10-CM

## 2020-10-19 DIAGNOSIS — Z00.00 ENCOUNTER FOR MEDICARE ANNUAL WELLNESS EXAM: Primary | ICD-10-CM

## 2020-10-19 DIAGNOSIS — Z13.220 SCREENING FOR LIPID DISORDERS: ICD-10-CM

## 2020-10-19 DIAGNOSIS — F41.9 ANXIETY: ICD-10-CM

## 2020-10-19 DIAGNOSIS — J45.20 INTERMITTENT ASTHMA, UNCOMPLICATED: ICD-10-CM

## 2020-10-19 DIAGNOSIS — E55.9 VITAMIN D DEFICIENCY: ICD-10-CM

## 2020-10-19 DIAGNOSIS — K90.0 CELIAC DISEASE: ICD-10-CM

## 2020-10-19 PROCEDURE — G0439 PPPS, SUBSEQ VISIT: HCPCS | Mod: 95 | Performed by: NURSE PRACTITIONER

## 2020-10-19 PROCEDURE — 99213 OFFICE O/P EST LOW 20 MIN: CPT | Mod: 95 | Performed by: NURSE PRACTITIONER

## 2020-10-19 RX ORDER — ALBUTEROL SULFATE 90 UG/1
2 AEROSOL, METERED RESPIRATORY (INHALATION) EVERY 6 HOURS PRN
Qty: 2 INHALER | Refills: 1 | Status: SHIPPED | OUTPATIENT
Start: 2020-10-19

## 2020-10-19 RX ORDER — VENLAFAXINE HYDROCHLORIDE 150 MG/1
150 CAPSULE, EXTENDED RELEASE ORAL DAILY
Qty: 90 CAPSULE | Refills: 3 | Status: SHIPPED | OUTPATIENT
Start: 2020-10-19 | End: 2021-11-04

## 2020-10-19 RX ORDER — VITAMIN B COMPLEX
1 TABLET ORAL DAILY
Qty: 90 TABLET | Refills: 3 | Status: SHIPPED | OUTPATIENT
Start: 2020-10-19 | End: 2021-11-04

## 2020-10-19 ASSESSMENT — PATIENT HEALTH QUESTIONNAIRE - PHQ9: SUM OF ALL RESPONSES TO PHQ QUESTIONS 1-9: 8

## 2020-10-19 NOTE — PROGRESS NOTES
"SUBJECTIVE:   Austin Shaffer is a 64 year old female who presents for Preventive Visit.      Patient has been advised of split billing requirements and indicates understanding: Yes   Are you in the first 12 months of your Medicare coverage?  No    Video start time:  10:50 a.m.     Healthy Habits:     In general, how would you rate your overall health?  good    Frequency of exercise:  none    Duration of exercise: none    Do you usually eat at least 4 servings of fruit and vegetables a day, include whole grains    & fiber and avoid regularly eating high fat or \"junk\" foods?  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     In the past 6 months, have you been bothered by leaking of urine? no    In general, how would you rate your overall mental or emotional health?  Good      Additional concerns today:  No    History of Present Illness     Asthma:  She presents for follow up of asthma.  She has no cough, no wheezing, and no shortness of breath. She is using a relief medication a few times a month. She does not have a controller medication. Patient is aware of the following triggers: emotions, exercise or sports, humidity and mold. The patient has not had a visit to the Emergency Room, Urgent Care or Hospital due to asthma since the last clinic visit.     Mental Health Follow-up:  Patient presents to follow-up on Depression & Anxiety.Patient's depression since last visit has been:  Better  The patient is not having other symptoms associated with depression.  Patient's anxiety since last visit has been:  Better  The patient is not having other symptoms associated with anxiety.  Any significant life events: other  Patient is feeling anxious " or having panic attacks.  Patient has no concerns about alcohol or drug use.     Social History  Tobacco Use    Smoking status: Former Smoker      Packs/day: 1.00      Years: 5.00      Pack years: 5      Types: Cigarettes      Start date: 1969      Quit date: 3/20/1974      Years since quittin.6    Smokeless tobacco: Never Used  Alcohol use: Yes    Alcohol/week: 0.0 standard drinks    Comment: About 3-4 a mounth  Drug use: No      Today's PHQ-9         PHQ-9 Total Score:     (P) 8   PHQ-9 Q9 Thoughts of better off dead/self-harm past 2 weeks :   (P) Not at all   Thoughts of suicide or self harm:      Self-harm Plan:        Self-harm Action:          Safety concerns for self or others:           Hyperlipidemia:  She presents for follow up of hyperlipidemia.  She is not taking medication to lower cholesterol. She is not having myalgia or other side effects to statin medications.    She eats 0-1 servings of fruits and vegetables daily.She consumes 0 sweetened beverage(s) daily.She exercises with enough effort to increase her heart rate 9 or less minutes per day.  She exercises with enough effort to increase her heart rate 3 or less days per week.   She is taking medications regularly.        Celiac Disease:    Diagnosed with Celiac Disease 1 year ago- through  [facility]. New dietary changes have been challenging. No more diarrhea and feels pretty good. Takes Omeprazole BID  For subsequent duodenal ulcers that were diagnosed at [facility].  Recent breath test was positive for bacteria in stomach.  Was put on antibiotics.      Anxiety:    ELEAZAR-7 SCORE 2020 2020 10/17/2020   Total Score - - -   Total Score 7 (mild anxiety) 8 (mild anxiety) 5 (mild anxiety)   Total Score 7 8 5     PHQ 2020 2020 10/17/2020   PHQ-9 Total Score 9 9 8   Q9: Thoughts of better off dead/self-harm past 2 weeks Not at all Not at all Not at all       Anxiety feels stable on Effexor XR, 150 mg daily - improved  after dose increase.   Uses Lorazepam typically when she leaves the home and is around a larger number of people.    Has pills remaining at home.    Is planning on leaving for [place] end of November.  Is anxious about travel and Celiac/gluten dietary options.    Will be in [place] for 4 months.        Do you feel safe in your environment? Yes    Have you ever done Advance Care Planning? (For example, a Health Directive, POLST, or a discussion with a medical provider or your loved ones about your wishes): Yes, advance care planning is on file.    Fall risk       Cognitive Screening   1) Repeat 3 items (Leader, Season, Table)    2) Clock draw: NORMAL  3) 3 item recall: Recalls 3 objects  Results: 3 items recalled: COGNITIVE IMPAIRMENT LESS LIKELY    Mini-CogTM Copyright S Jaclyn. Licensed by the author for use in [facility] [employer]; reprinted with permission (deb@[facility]). All rights reserved.      Do you have sleep apnea, excessive snoring or daytime drowsiness?: no    Reviewed and updated as needed this visit by clinical staff   Allergies  Meds  Problems             Reviewed and updated as needed this visit by Provider   Allergies  Meds  Problems            Social History     Tobacco Use     Smoking status: Former Smoker     Packs/day: 1.00     Years: 5.00     Pack years: 5.00     Types: Cigarettes     Start date: 1969     Quit date: 3/20/1974     Years since quittin.6     Smokeless tobacco: Never Used   Substance Use Topics     Alcohol use: Yes     Alcohol/week: 0.0 standard drinks     Comment: About 3-4 a mounth       Alcohol Use 2019   Prescreen: >3 drinks/day or >7 drinks/week? No   Prescreen: >3 drinks/day or >7 drinks/week? -         Current providers sharing in care for this patient include:    Patient Care Team:  Dunia Desai APRN CNP as PCP - General (Nurse Practitioner - Family)  Dunia Desai APRN CNP as Assigned PCP    The following health maintenance items are  reviewed in Epic and correct as of today:  Health Maintenance   Topic Date Due     ZOSTER IMMUNIZATION (1 of 2) 11/04/2003     Pneumococcal Vaccine: 65+ Years (1 of 1 - PPSV23) 11/04/2018     ASTHMA CONTROL TEST  01/02/2020     INFLUENZA VACCINE (1) 09/01/2020     FALL RISK ASSESSMENT  10/03/2020     ASTHMA ACTION PLAN  10/03/2020     PHQ-9  04/19/2021     MAMMO SCREENING  10/03/2021     MEDICARE ANNUAL WELLNESS VISIT  10/19/2021     ANNUAL REVIEW OF HM ORDERS  10/19/2021     LIPID  10/03/2024     ADVANCE CARE PLANNING  10/19/2025     DTAP/TDAP/TD IMMUNIZATION (3 - Td) 09/15/2026     COLORECTAL CANCER SCREENING  08/16/2029     DEXA  Completed     HEPATITIS C SCREENING  Completed     DEPRESSION ACTION PLAN  Completed     Pneumococcal Vaccine: Pediatrics (0 to 5 Years) and At-Risk Patients (6 to 64 Years)  Aged Out     IPV IMMUNIZATION  Aged Out     MENINGITIS IMMUNIZATION  Aged Out     HEPATITIS B IMMUNIZATION  Aged Out     BP Readings from Last 3 Encounters:   06/17/20 110/80   10/03/19 108/74   07/16/19 105/78    Wt Readings from Last 3 Encounters:   06/17/20 75.6 kg (166 lb 11.2 oz)   10/03/19 74.4 kg (164 lb)   07/16/19 77.6 kg (171 lb)                  Patient Active Problem List   Diagnosis     Moderate recurrent major depression (H)     Intermittent asthma     CARDIOVASCULAR SCREENING; LDL GOAL LESS THAN 160     Advanced directives, counseling/discussion     Anxiety     Vitamin D deficiency     Overflow diarrhea     Celiac disease     Age-related osteoporosis without current pathological fracture     Past Surgical History:   Procedure Laterality Date     ABDOMEN SURGERY      ? Enteritis as a child, possibly appendix     APPENDECTOMY       COLONOSCOPY      Cant remember the date     GENITOURINARY SURGERY      Cant remember the date     HYSTERECTOMY TOTAL ABDOMINAL, BILATERAL SALPINGO-OOPHORECTOMY, COMBINED  1998     TONSILLECTOMY         Social History     Tobacco Use     Smoking status: Former Smoker      Packs/day: 1.00     Years: 5.00     Pack years: 5.00     Types: Cigarettes     Start date: 1969     Quit date: 3/20/1974     Years since quittin.6     Smokeless tobacco: Never Used   Substance Use Topics     Alcohol use: Yes     Alcohol/week: 0.0 standard drinks     Comment: About 3-4 a [facility]     Family History   Problem Relation Age of Onset     Cardiovascular Mother      Hypertension Mother      Cardiovascular Maternal Grandmother      Cancer Father         Kidney     Other Cancer Father      Myocardial Infarction Brother 60        2017     Cerebrovascular Disease Brother 63             Mental Illness Brother      Diabetes Brother      Myocardial Infarction Brother      Hypertension Brother         Had bad stoke      Cerebrovascular Disease Brother      Diabetes Brother         Passed away at 60     Hypertension Brother      Other Cancer Maternal Grandfather          Current Outpatient Medications   Medication Sig Dispense Refill     albuterol (PROAIR HFA) 108 (90 Base) MCG/ACT inhaler Inhale 2 puffs into the lungs every 6 hours as needed for shortness of breath / dyspnea 2 Inhaler 1     venlafaxine (EFFEXOR-XR) 150 MG 24 hr capsule Take 1 capsule (150 mg) by mouth daily 90 capsule 3     Vitamin D3 (CHOLECALCIFEROL) 25 mcg (1000 units) tablet Take 1 tablet (25 mcg) by mouth daily 90 tablet 3     betamethasone dipropionate (DIPROSONE) 0.05 % external cream Apply to trunk, arms and legs bid for 10-14 consecutive days, Not to be used on face or groin 45 g 1     calcium carbonate (OS-ZACH) 500 MG tablet Take 1 tablet (500 mg) by mouth 2 times daily       hydrOXYzine (ATARAX) 25 MG tablet 1 tab at bedtime 90 tablet 3     Lactobacillus Acidophilus (ACIDOPHILUS LACTOBACILLUS) POWD        LORazepam (ATIVAN) 0.5 MG tablet TAKE 1/2 TO 1 TABLET BY MOUTH EVERY 8 HOURS AS NEEDED FOR ANXIETY 30 tablet 0     Omega-3 Fish Oil 500 MG capsule Take 1 capsule (500 mg) by mouth daily       omeprazole (PRILOSEC) 20 MG  "DR capsule Take 1 capsule (20 mg) by mouth 2 times daily       vitamin D3 (CHOLECALCIFEROL) 1000 units (25 mcg) tablet Take by mouth daily         Review of Systems  Constitutional, HEENT, cardiovascular, pulmonary, gi and gu systems are negative, except as otherwise noted.    OBJECTIVE:   There were no vitals taken for this visit. Estimated body mass index is 30.74 kg/m  as calculated from the following:    Height as of 10/3/19: 1.568 m (5' 1.75\").    Weight as of 6/17/20: 75.6 kg (166 lb 11.2 oz).  Physical Exam  GENERAL: healthy, alert and no distress   EYES: Eyes grossly normal to inspection,  RESP: breathing is normal, no audible cough  PSYCH: mentation appears normal, affect normal/bright      ASSESSMENT / PLAN:     Austin was seen today for physical.    Diagnoses and all orders for this visit:    Encounter for Medicare annual wellness exam    Intermittent asthma, uncomplicated  Currently stable, well controlled.    -     albuterol (PROAIR HFA) 108 (90 Base) MCG/ACT inhaler; Inhale 2 puffs into the lungs every 6 hours as needed for shortness of breath / dyspnea    Anxiety  Moderate recurrent major depression (H)  PHQ 4/29/2020 5/22/2020 10/17/2020   PHQ-9 Total Score 9 9 8   Q9: Thoughts of better off dead/self-harm past 2 weeks Not at all Not at all Not at all     ELEAZAR-7 SCORE 4/29/2020 5/22/2020 10/17/2020   Total Score - - -   Total Score 7 (mild anxiety) 8 (mild anxiety) 5 (mild anxiety)   Total Score 7 8 5     Improved mood/anxiety control with increase in Effexor-XR dose.    -     venlafaxine (EFFEXOR-XR) 150 MG 24 hr capsule; Take 1 capsule (150 mg) by mouth daily    Celiac disease  Stable, followed by [facility].  She is following a gluten free diet closely and has no concerns related to the diagnosis at this time.   -     Comprehensive metabolic panel (BMP + Alb, Alk Phos, ALT, AST, Total. Bili, TP); Future  -     **CBC with platelets FUTURE anytime; Future    Vitamin D deficiency  -     Vitamin D3 " "(CHOLECALCIFEROL) 25 mcg (1000 units) tablet; Take 1 tablet (25 mcg) by mouth daily    Screening for lipid disorders  -     Lipid panel reflex to direct LDL Fasting; Future    Other orders  -     REVIEW OF HEALTH MAINTENANCE PROTOCOL ORDERS      COUNSELING:  Reviewed preventive health counseling, as reflected in patient instructions       Regular exercise       Healthy diet/nutrition    Estimated body mass index is 30.74 kg/m  as calculated from the following:    Height as of 10/3/19: 1.568 m (5' 1.75\").    Weight as of 6/17/20: 75.6 kg (166 lb 11.2 oz).    Weight management plan: Discussed healthy diet and exercise guidelines    She reports that she quit smoking about 46 years ago. Her smoking use included cigarettes. She started smoking about 51 years ago. She has a 5.00 pack-year smoking history. She has never used smokeless tobacco.      Appropriate preventive services were discussed with this patient, including applicable screening as appropriate for cardiovascular disease, diabetes, osteopenia/osteoporosis, and glaucoma.  As appropriate for age/gender, discussed screening for colorectal cancer, prostate cancer, breast cancer, and cervical cancer. Checklist reviewing preventive services available has been given to the patient.    Reviewed patients plan of care and provided an AVS. The Basic Care Plan (routine screening as documented in Health Maintenance) for Austin meets the Care Plan requirement. This Care Plan has been established and reviewed with the Patient.    Counseling Resources:  ATP IV Guidelines  Pooled Cohorts Equation Calculator  Breast Cancer Risk Calculator  Breast Cancer: Medication to Reduce Risk  FRAX Risk Assessment  ICSI Preventive Guidelines  Dietary Guidelines for Americans, 2010  Kasumi-sou's MyPlate  ASA Prophylaxis  Lung CA Screening    Dunia Desai, TANYA [facility] SAVAGE    Video end time:  11:10 a.m.   Via Submittable.   Patient location: home    Identified Health " Risks:  Answers for HPI/ROS submitted by the patient on 10/17/2020   Chronic problems general questions HPI Form  If you checked off any problems, how difficult have these problems made it for you to do your work, take care of things at home, or get along with other people?: Not difficult at all  PHQ9 TOTAL SCORE: 8  ELEAZAR 7 TOTAL SCORE: 5

## 2020-10-19 NOTE — PATIENT INSTRUCTIONS
Patient Education   Personalized Prevention Plan  You are due for the preventive services outlined below.  Your care team is available to assist you in scheduling these services.  If you have already completed any of these items, please share that information with your care team to update in your medical record.  Health Maintenance Due   Topic Date Due     ANNUAL REVIEW OF HM ORDERS  1955     Zoster (Shingles) Vaccine (1 of 2) 11/04/2003     Pneumococcal Vaccine (1 of 1 - PPSV23) 11/04/2018     Asthma Control Test  01/02/2020     Flu Vaccine (1) 09/01/2020     FALL RISK ASSESSMENT  10/03/2020     Asthma Action Plan - yearly  10/03/2020       Depression and Suicide in Older Adults    Nearly 2 million older Americans have some type of depression. Sadly, some of them even take their own lives. Yet depression among older adults is often ignored. Learn the warning signs. You may help spare a loved one needless pain. You may also save a life.  What is depression?  Depression is a serious illness that affects the way you think and feel. It is not a normal part of aging, nor is it a sign of weakness, a character flaw, or something you can snap out of. Most people with depression need treatment to get better. The most common symptom is a feeling of deep sadness. People who are depressed also may seem tired and listless. And nothing seems to give them pleasure. It s normal to grieve or be sad sometimes. But sadness lessens or passes with time. Depression rarely goes away or improves on its own. Other symptoms of depression are:    Sleeping more or less than normal    Eating more or less than normal    Having headaches, stomachaches, or other pains that don t go away    Feeling nervous,  empty,  or worthless    Crying a great deal    Thinking or talking about suicide or death    Feeling confused or forgetful  What causes it?  The causes of depression aren t fully known. But, it is thought to result from a complex  interaction of biochemistry, genetics, environmental factors, and personality. Certain chemicals in the brain play a role. Depression does run in families. And life stresses can also trigger depression in some people. Older adults often face many stressors, such as death of friends or a spouse, health problems, and financial concerns.  How you can help  Often, depressed people may not want to ask for help. When they do, they may be ignored. Or, they may receive the wrong treatment. You can help by showing parents and older friends love and support. If they seem depressed, help them find the right treatment. Talk to your doctor. Or contact a local mental health center, social service agency, or hospital. With modern treatment, no one has to suffer from depression.  If your older friend or family member agrees, you can be an advocate for him or her in the healthcare setting. Many times, older adults have other chronic illnesses such as diabetes, heart disease, or cancer that can cause symptoms of depression. Medicine side effects can also contribute to certain behaviors and feelings. It is important that the older adult's healthcare provider listens and sorts out the causes of any symptoms of depression and makes referrals to mental health specialists when needed. Untreated depression can result in misdiagnosis, including brain disorders such as dementia and Alzheimer's. If the health professional does not take the issue of depression seriously, ask your family member or friend to consider finding another provider.  Resources    National Suicide Prevention Lifeline (crisis hotline)046-190-RNRI (2169)    National [place] of Mental Zmmxua926-302-1171nxx.[facility].nih.gov    National [place] on Mental Cuvqslv016-481-5997goz.ebony.org    Mental Health Vwpmgfp987-153-7830hsl.[facility].org    National Suicide Jeakgds379-970-8891 (800-SUICIDE)   Date Last Reviewed: 2/1/2017 2000-2019 [employer]. [address], [place] [address]. All rights reserved. This information is not intended as a substitute for professional medical care. Always follow your healthcare professional's instructions.

## 2020-10-28 DIAGNOSIS — K90.0 CELIAC DISEASE: ICD-10-CM

## 2020-10-28 DIAGNOSIS — Z13.220 SCREENING FOR LIPID DISORDERS: ICD-10-CM

## 2020-10-28 LAB
ERYTHROCYTE [DISTWIDTH] IN BLOOD BY AUTOMATED COUNT: 12.9 % (ref 10–15)
HCT VFR BLD AUTO: 42.7 % (ref 35–47)
HGB BLD-MCNC: 14.2 G/DL (ref 11.7–15.7)
MCH RBC QN AUTO: 30 PG (ref 26.5–33)
MCHC RBC AUTO-ENTMCNC: 33.3 G/DL (ref 31.5–36.5)
MCV RBC AUTO: 90 FL (ref 78–100)
PLATELET # BLD AUTO: 294 10E9/L (ref 150–450)
RBC # BLD AUTO: 4.73 10E12/L (ref 3.8–5.2)
WBC # BLD AUTO: 5.5 10E9/L (ref 4–11)

## 2020-10-28 PROCEDURE — 85027 COMPLETE CBC AUTOMATED: CPT | Performed by: NURSE PRACTITIONER

## 2020-10-28 PROCEDURE — 80053 COMPREHEN METABOLIC PANEL: CPT | Performed by: NURSE PRACTITIONER

## 2020-10-28 PROCEDURE — 36415 COLL VENOUS BLD VENIPUNCTURE: CPT | Performed by: NURSE PRACTITIONER

## 2020-10-28 PROCEDURE — 80061 LIPID PANEL: CPT | Performed by: NURSE PRACTITIONER

## 2020-10-28 NOTE — RESULT ENCOUNTER NOTE
Dear Austin,     -Normal red blood cell (hgb) levels, normal white blood cell count and normal platelet levels.      Please send a [employer] message or call [phone]  if you have any questions.      TANYA Tavera, [facility] [place]    If you have further questions about the interpretation of your labs, [email or web address] is a good website to check out for further information.

## 2020-10-29 LAB
ALBUMIN SERPL-MCNC: 3.5 G/DL (ref 3.4–5)
ALP SERPL-CCNC: 116 U/L (ref 40–150)
ALT SERPL W P-5'-P-CCNC: 25 U/L (ref 0–50)
ANION GAP SERPL CALCULATED.3IONS-SCNC: 4 MMOL/L (ref 3–14)
AST SERPL W P-5'-P-CCNC: 16 U/L (ref 0–45)
BILIRUB SERPL-MCNC: 0.5 MG/DL (ref 0.2–1.3)
BUN SERPL-MCNC: 7 MG/DL (ref 7–30)
CALCIUM SERPL-MCNC: 9.3 MG/DL (ref 8.5–10.1)
CHLORIDE SERPL-SCNC: 109 MMOL/L (ref 94–109)
CHOLEST SERPL-MCNC: 251 MG/DL
CO2 SERPL-SCNC: 25 MMOL/L (ref 20–32)
CREAT SERPL-MCNC: 0.7 MG/DL (ref 0.52–1.04)
GFR SERPL CREATININE-BSD FRML MDRD: 89 ML/MIN/{1.73_M2}
GLUCOSE SERPL-MCNC: 94 MG/DL (ref 70–99)
HDLC SERPL-MCNC: 53 MG/DL
LDLC SERPL CALC-MCNC: 171 MG/DL
NONHDLC SERPL-MCNC: 198 MG/DL
POTASSIUM SERPL-SCNC: 4.6 MMOL/L (ref 3.4–5.3)
PROT SERPL-MCNC: 7 G/DL (ref 6.8–8.8)
SODIUM SERPL-SCNC: 138 MMOL/L (ref 133–144)
TRIGL SERPL-MCNC: 134 MG/DL

## 2020-11-02 ENCOUNTER — MYC MEDICAL ADVICE (OUTPATIENT)
Dept: FAMILY MEDICINE | Facility: CLINIC | Age: 65
End: 2020-11-02

## 2020-11-02 ENCOUNTER — E-VISIT (OUTPATIENT)
Dept: FAMILY MEDICINE | Facility: CLINIC | Age: 65
End: 2020-11-02

## 2020-11-02 DIAGNOSIS — R30.0 DYSURIA: ICD-10-CM

## 2020-11-02 DIAGNOSIS — R30.0 DYSURIA: Primary | ICD-10-CM

## 2020-11-02 DIAGNOSIS — N30.01 ACUTE CYSTITIS WITH HEMATURIA: ICD-10-CM

## 2020-11-02 DIAGNOSIS — R82.90 NONSPECIFIC FINDING ON EXAMINATION OF URINE: Primary | ICD-10-CM

## 2020-11-02 LAB
ALBUMIN UR-MCNC: 30 MG/DL
APPEARANCE UR: CLEAR
BACTERIA #/AREA URNS HPF: ABNORMAL /HPF
BILIRUB UR QL STRIP: NEGATIVE
COLOR UR AUTO: YELLOW
GLUCOSE UR STRIP-MCNC: NEGATIVE MG/DL
HGB UR QL STRIP: ABNORMAL
KETONES UR STRIP-MCNC: NEGATIVE MG/DL
LEUKOCYTE ESTERASE UR QL STRIP: ABNORMAL
NITRATE UR QL: POSITIVE
NON-SQ EPI CELLS #/AREA URNS LPF: ABNORMAL /LPF
PH UR STRIP: 7 PH (ref 5–7)
RBC #/AREA URNS AUTO: ABNORMAL /HPF
SOURCE: ABNORMAL
SP GR UR STRIP: 1.02 (ref 1–1.03)
UROBILINOGEN UR STRIP-ACNC: 0.2 EU/DL (ref 0.2–1)
WBC #/AREA URNS AUTO: ABNORMAL /HPF

## 2020-11-02 PROCEDURE — 87186 SC STD MICRODIL/AGAR DIL: CPT | Performed by: NURSE PRACTITIONER

## 2020-11-02 PROCEDURE — 81001 URINALYSIS AUTO W/SCOPE: CPT | Performed by: NURSE PRACTITIONER

## 2020-11-02 PROCEDURE — 87086 URINE CULTURE/COLONY COUNT: CPT | Performed by: NURSE PRACTITIONER

## 2020-11-02 PROCEDURE — 99421 OL DIG E/M SVC 5-10 MIN: CPT | Performed by: NURSE PRACTITIONER

## 2020-11-02 PROCEDURE — 87088 URINE BACTERIA CULTURE: CPT | Performed by: NURSE PRACTITIONER

## 2020-11-02 RX ORDER — PHENAZOPYRIDINE HYDROCHLORIDE 200 MG/1
200 TABLET, FILM COATED ORAL 3 TIMES DAILY PRN
Qty: 20 TABLET | Refills: 0 | Status: SHIPPED | OUTPATIENT
Start: 2020-11-02 | End: 2021-04-29

## 2020-11-02 RX ORDER — CIPROFLOXACIN 250 MG/1
250 TABLET, FILM COATED ORAL 2 TIMES DAILY
Qty: 6 TABLET | Refills: 0 | Status: SHIPPED | OUTPATIENT
Start: 2020-11-02 | End: 2020-11-05

## 2020-11-02 NOTE — RESULT ENCOUNTER NOTE
Dear Austin,     -LDL(bad) cholesterol level is elevated (similar to value 1 year ago) which can increase your heart disease risk.  A diet high in fat and simple carbohydrates, genetics and being overweight can contribute to this. ADVISE: exercising 150 minutes of aerobic exercise per week (30 minutes for 5 days per week or 50 minutes for 3 days per week are options) and eating a low saturated fat/low carbohydrate diet are helpful to improve this. In 12 months, you should recheck your fasting cholesterol panel. Your levels are getting close to recommending a cholesterol medication.  We look at the value of your LDL (bad cholesterol) and your 10 year risk score for stroke and heart disease.      The 10-year ASCVD risk score ([place] YEUNG Jr., et al., 2013) is: 5.3%    Values used to calculate the score:      Age: 64 years      Sex: Female      Is Non- : No      Diabetic: No      Tobacco smoker: No      Systolic Blood Pressure: 110 mmHg      Is BP treated: No      HDL Cholesterol: 53 mg/dL      Total Cholesterol: 251 mg/dL    -Liver and gallbladder tests are normal (ALT,AST, Alk phos, bilirubin), kidney function is normal (Cr, GFR), sodium is normal, potassium is normal, calcium is normal, glucose is normal.      Please send a [employer] message or call [phone]  if you have any questions.      TANYA Tavera, CNP  [place] [facility] [place]    If you have further questions about the interpretation of your labs, [email or web address] is a good website to check out for further information.

## 2020-11-05 LAB
BACTERIA SPEC CULT: ABNORMAL
SPECIMEN SOURCE: ABNORMAL

## 2020-11-05 NOTE — RESULT ENCOUNTER NOTE
Dear Austin,     -Urine culture is abnormal and grew out bacteria that are sensitive to the antibiotic you have been given.  Complete the medication as prescribed and if you experience new, worsening or persistent symptoms, you should call or return for a recheck.      Please send a [email or web address] message or call [phone]  if you have any questions.      Dunia Desai, TANYA, CNP  [place] [facility] [place]    If you have further questions about the interpretation of your labs, [email or web address] is a good website to check out for further information.

## 2020-11-10 DIAGNOSIS — F41.9 ANXIETY: ICD-10-CM

## 2020-11-10 RX ORDER — LORAZEPAM 0.5 MG/1
TABLET ORAL
Qty: 30 TABLET | Refills: 0 | Status: SHIPPED | OUTPATIENT
Start: 2020-11-10 | End: 2021-06-24

## 2020-11-10 NOTE — TELEPHONE ENCOUNTER
Lorazepam 0.5 mg  Last Written Prescription Date:  8/12/2020  Last Fill Quantity: 30,  # refills: 0   Last office visit: 10/3/2019 with prescribing provider:  Giselle   Future Office Visit:      RX [place] ([place]) reviewed:  reviewed- no concerns 11/10/2020    [place] profile:  [email or web address]    Routing refill request to provider for review/approval because:  Drug not on the [facility] refill protocol     IVY Rivera, RN  [facility]

## 2020-12-17 ENCOUNTER — TRANSFERRED RECORDS (OUTPATIENT)
Dept: HEALTH INFORMATION MANAGEMENT | Facility: CLINIC | Age: 65
End: 2020-12-17

## 2021-01-14 ENCOUNTER — HEALTH MAINTENANCE LETTER (OUTPATIENT)
Age: 66
End: 2021-01-14

## 2021-03-20 ENCOUNTER — MYC MEDICAL ADVICE (OUTPATIENT)
Dept: FAMILY MEDICINE | Facility: CLINIC | Age: 66
End: 2021-03-20

## 2021-03-20 DIAGNOSIS — S69.90XD INJURY OF WRIST, UNSPECIFIED LATERALITY, SUBSEQUENT ENCOUNTER: Primary | ICD-10-CM

## 2021-03-22 NOTE — TELEPHONE ENCOUNTER
Let's get patient connected with orthopedics for this.  Referral placed, please contact patient and assist with scheduling as able.  - Alis, CNP

## 2021-03-22 NOTE — TELEPHONE ENCOUNTER
Please see my chart message below     Please review and advise     Thank you     Lianet Mora RN, BSN  [facility]

## 2021-03-22 NOTE — TELEPHONE ENCOUNTER
Called and gave patient information for [facility] and told her to call to schedule and if she had any problems getting an appointment to call us back.       Janet Arreguin

## 2021-03-25 ENCOUNTER — OFFICE VISIT (OUTPATIENT)
Dept: ORTHOPEDICS | Facility: CLINIC | Age: 66
End: 2021-03-25
Attending: NURSE PRACTITIONER
Payer: COMMERCIAL

## 2021-03-25 ENCOUNTER — ANCILLARY PROCEDURE (OUTPATIENT)
Dept: GENERAL RADIOLOGY | Facility: CLINIC | Age: 66
End: 2021-03-25
Attending: ORTHOPAEDIC SURGERY
Payer: COMMERCIAL

## 2021-03-25 VITALS
HEIGHT: 61 IN | WEIGHT: 162 LBS | DIASTOLIC BLOOD PRESSURE: 70 MMHG | BODY MASS INDEX: 30.58 KG/M2 | SYSTOLIC BLOOD PRESSURE: 108 MMHG

## 2021-03-25 DIAGNOSIS — M25.532 ACUTE PAIN OF LEFT WRIST: ICD-10-CM

## 2021-03-25 DIAGNOSIS — S69.90XD INJURY OF WRIST, UNSPECIFIED LATERALITY, SUBSEQUENT ENCOUNTER: Primary | ICD-10-CM

## 2021-03-25 DIAGNOSIS — S52.552A OTHER CLOSED EXTRA-ARTICULAR FRACTURE OF DISTAL END OF LEFT RADIUS, INITIAL ENCOUNTER: ICD-10-CM

## 2021-03-25 PROCEDURE — 99204 OFFICE O/P NEW MOD 45 MIN: CPT | Performed by: ORTHOPAEDIC SURGERY

## 2021-03-25 PROCEDURE — 73110 X-RAY EXAM OF WRIST: CPT | Mod: LT | Performed by: RADIOLOGY

## 2021-03-25 ASSESSMENT — MIFFLIN-ST. JEOR: SCORE: 1211.17

## 2021-03-25 NOTE — PATIENT INSTRUCTIONS
Open reduction internal excision of left wrist fracture will be scheduled as an outpatient.  Elevation in the meantime.   Continue to use the splint to protect the fracture site.\  Frequent finger movement.

## 2021-03-25 NOTE — Clinical Note
3/25/2021         RE: Austin Shaffer  [id number] [address] [id number]        Dear Colleague,    Thank you for referring your patient, Austin Shaffer, to the [facility] [place]. Please see a copy of my visit note below.    HISTORY OF PRESENT ILLNESS:    Austin Shaffer is a 65 year old female who is seen in consultation at the request of Dr. Desai for left wrist fracture.  Injury occurred while in [place], approximately 2.5 weeks ago. She reports she was out for a walk and fell.     Present symptoms: left wrist pain generalized across the wrist. Slightly increased pain along the radial aspect.  She notes initial swelling, that has improved.  Occasional tingling of the thumb and index finger she felt was related to the ace bandage being too tight.   Current pain level: 2/10, Worst pain level: 4/10   Treatments tried to this point: volar resting splint, OTC pain medication  Orthopedic PMH: previous left hand fracture ~20 years ago     Past Medical History:   Diagnosis Date     Depressive disorder      Mild intermittent asthma        Past Surgical History:   Procedure Laterality Date     ABDOMEN SURGERY      ? Enteritis as a child, possibly appendix     APPENDECTOMY       COLONOSCOPY      Cant remember the date     GENITOURINARY SURGERY      Cant remember the date     HYSTERECTOMY TOTAL ABDOMINAL, BILATERAL SALPINGO-OOPHORECTOMY, COMBINED  1998     TONSILLECTOMY         Family History   Problem Relation Age of Onset     Cardiovascular Mother      Hypertension Mother      Cardiovascular Maternal Grandmother      Cancer Father         Kidney     Other Cancer Father      Myocardial Infarction Brother 60        2017     Cerebrovascular Disease Brother 63        2017     Mental Illness Brother      Diabetes Brother      Myocardial Infarction Brother      Hypertension Brother         Had bad stoke 2015     Cerebrovascular Disease Brother      Diabetes Brother         Passed  away at 60     Hypertension Brother      Other Cancer Maternal Grandfather        Social History     Socioeconomic History     Marital status:      Spouse name: Not on file     Number of children: Not on file     Years of education: Not on file     Highest education level: Not on file   Occupational History     Not on file   Social Needs     Financial resource strain: Not on file     Food insecurity     Worry: Not on file     Inability: Not on file     Transportation needs     Medical: Not on file     Non-medical: Not on file   Tobacco Use     Smoking status: Former Smoker     Packs/day: 1.00     Years: 5.00     Pack years: 5.00     Types: Cigarettes     Start date: 1969     Quit date: 3/20/1974     Years since quittin.0     Smokeless tobacco: Never Used   Substance and Sexual Activity     Alcohol use: Yes     Alcohol/week: 0.0 standard drinks     Comment: About 3-4 a mounth     Drug use: No     Sexual activity: Yes     Partners: Male     Birth control/protection: Male Surgical, Female Surgical   Lifestyle     Physical activity     Days per week: Not on file     Minutes per session: Not on file     Stress: Not on file   Relationships     Social connections     Talks on phone: Not on file     Gets together: Not on file     Attends Congregational service: Not on file     Active member of club or organization: Not on file     Attends meetings of clubs or organizations: Not on file     Relationship status: Not on file     Intimate partner violence     Fear of current or ex partner: Not on file     Emotionally abused: Not on file     Physically abused: Not on file     Forced sexual activity: Not on file   Other Topics Concern     Parent/sibling w/ CABG, MI or angioplasty before 65F 55M? No      Service Not Asked     Blood Transfusions No     Caffeine Concern No     Occupational Exposure Not Asked     Hobby Hazards Not Asked     Sleep Concern No     Stress Concern Yes     Comment: Aging mother      Weight Concern Not Asked     Special Diet Not Asked     Back Care Not Asked     Exercise Not Asked     Bike Helmet Not Asked     Seat Belt Yes     Self-Exams Not Asked   Social History Narrative    Retired       Current Outpatient Medications   Medication Sig Dispense Refill     albuterol (PROAIR HFA) 108 (90 Base) MCG/ACT inhaler Inhale 2 puffs into the lungs every 6 hours as needed for shortness of breath / dyspnea 2 Inhaler 1     betamethasone dipropionate (DIPROSONE) 0.05 % external cream Apply to trunk, arms and legs bid for 10-14 consecutive days, Not to be used on face or groin 45 g 1     calcium carbonate (OS-ZACH) 500 MG tablet Take 1 tablet (500 mg) by mouth 2 times daily       hydrOXYzine (ATARAX) 25 MG tablet 1 tab at bedtime 90 tablet 3     Lactobacillus Acidophilus (ACIDOPHILUS LACTOBACILLUS) POWD        LORazepam (ATIVAN) 0.5 MG tablet TAKE 1/2 TO 1 (ONE-HALF TO ONE) TABLET BY MOUTH EVERY 8 HOURS AS NEEDED FOR ANXIETY 30 tablet 0     Omega-3 Fish Oil 500 MG capsule Take 1 capsule (500 mg) by mouth daily       omeprazole (PRILOSEC) 20 MG DR capsule Take 1 capsule (20 mg) by mouth 2 times daily       phenazopyridine (PYRIDIUM) 200 MG tablet Take 1 tablet (200 mg) by mouth 3 times daily as needed for irritation 20 tablet 0     venlafaxine (EFFEXOR-XR) 150 MG 24 hr capsule Take 1 capsule (150 mg) by mouth daily 90 capsule 3     Vitamin D3 (CHOLECALCIFEROL) 25 mcg (1000 units) tablet Take 1 tablet (25 mcg) by mouth daily 90 tablet 3       Allergies   Allergen Reactions     Sulfa Drugs      Gluten Meal      Morphine Difficulty breathing       REVIEW OF SYSTEMS:  CONSTITUTIONAL:  NEGATIVE for fever, chills, change in weight  INTEGUMENTARY/SKIN:  NEGATIVE for worrisome rashes, moles or lesions  EYES:  NEGATIVE for vision changes or irritation  ENT/MOUTH:  NEGATIVE for ear, mouth and throat problems  RESP:  NEGATIVE for significant cough or SOB  BREAST:  NEGATIVE for masses, tenderness or discharge  CV:   "NEGATIVE for chest pain, palpitations or peripheral edema  GI:  Ulcer, constipation, reflux   :  Negative   MUSCULOSKELETAL:  See HPI above  NEURO:  paresthesias  ENDOCRINE:  NEGATIVE for temperature intolerance, skin/hair changes  HEME/ALLERGY/IMMUNE:  NEGATIVE for bleeding problems  PSYCHIATRIC:  Depression, panic attack      PHYSICAL EXAM:  /70 (BP Location: Right arm, Patient Position: Chair)   Ht 1.556 m (5' 1.25\")   Wt 73.5 kg (162 lb)   BMI 30.36 kg/m    Body mass index is 30.36 kg/m .   GENERAL APPEARANCE: healthy, alert and no distress   HEENT: No apparent thyroid megaly. Clear sclera with normal ocular movement  RESPIRATORY: No labored breathing  SKIN: no suspicious lesions or rashes  NEURO: Normal strength and tone, mentation intact and speech normal  VASCULAR: Good pulses, and capillary refill   LYMPH: no lymphadenopathy   PSYCH:  mentation appears normal and affect normal/bright    MUSCULOSKELETAL:  ***     ASSESSMENT:  ***    PLAN:  ***    *** Patient to follow up with Primary Care provider regarding elevated blood pressure.    Imaging Interpretation:   ***      Fahad Longoria MD  Department of Orthopedic Surgery        Disclaimer: This note consists of symbols derived from keyboarding, dictation and/or voice recognition software. As a result, there may be errors in the script that have gone undetected. Please consider this when interpreting information found in this chart.      HISTORY OF PRESENT ILLNESS:    Austin Shaffer is a 65 year old female who is seen in consultation at the request of Dr. Desai for left wrist fracture.  Injury occurred while in [place], approximately 2.5 weeks ago. She reports she was out for a walk and fell.     Present symptoms: left wrist pain generalized across the wrist. Slightly increased pain along the radial aspect.  She notes initial swelling, that has improved.  Occasional tingling of the thumb and index finger she felt was related to the ace bandage being " too tight.   Current pain level: 2/10, Worst pain level: 4/10   Treatments tried to this point: volar resting splint, OTC pain medication  Orthopedic PMH: previous left hand fracture ~20 years ago     Past Medical History:   Diagnosis Date     Depressive disorder      Mild intermittent asthma        Past Surgical History:   Procedure Laterality Date     ABDOMEN SURGERY      ? Enteritis as a child, possibly appendix     APPENDECTOMY       COLONOSCOPY      Cant remember the date     GENITOURINARY SURGERY      Cant remember the date     HYSTERECTOMY TOTAL ABDOMINAL, BILATERAL SALPINGO-OOPHORECTOMY, COMBINED  1998     TONSILLECTOMY         Family History   Problem Relation Age of Onset     Cardiovascular Mother      Hypertension Mother      Cardiovascular Maternal Grandmother      Cancer Father         Kidney     Other Cancer Father      Myocardial Infarction Brother 60        2017     Cerebrovascular Disease Brother 63        2017     Mental Illness Brother      Diabetes Brother      Myocardial Infarction Brother      Hypertension Brother         Had bad stoke 2015     Cerebrovascular Disease Brother      Diabetes Brother         Passed away at 60     Hypertension Brother      Other Cancer Maternal Grandfather        Social History     Socioeconomic History     Marital status:      Spouse name: Not on file     Number of children: Not on file     Years of education: Not on file     Highest education level: Not on file   Occupational History     Not on file   Social Needs     Financial resource strain: Not on file     Food insecurity     Worry: Not on file     Inability: Not on file     Transportation needs     Medical: Not on file     Non-medical: Not on file   Tobacco Use     Smoking status: Former Smoker     Packs/day: 1.00     Years: 5.00     Pack years: 5.00     Types: Cigarettes     Start date: 1969     Quit date: 3/20/1974     Years since quittin.0     Smokeless tobacco: Never Used   Substance and  Sexual Activity     Alcohol use: Yes     Alcohol/week: 0.0 standard drinks     Comment: About 3-4 a mounth     Drug use: No     Sexual activity: Yes     Partners: Male     Birth control/protection: Male Surgical, Female Surgical   Lifestyle     Physical activity     Days per week: Not on file     Minutes per session: Not on file     Stress: Not on file   Relationships     Social connections     Talks on phone: Not on file     Gets together: Not on file     Attends Advent service: Not on file     Active member of club or organization: Not on file     Attends meetings of clubs or organizations: Not on file     Relationship status: Not on file     Intimate partner violence     Fear of current or ex partner: Not on file     Emotionally abused: Not on file     Physically abused: Not on file     Forced sexual activity: Not on file   Other Topics Concern     Parent/sibling w/ CABG, MI or angioplasty before 65F 55M? No      Service Not Asked     Blood Transfusions No     Caffeine Concern No     Occupational Exposure Not Asked     Hobby Hazards Not Asked     Sleep Concern No     Stress Concern Yes     Comment: Aging mother     Weight Concern Not Asked     Special Diet Not Asked     Back Care Not Asked     Exercise Not Asked     Bike Helmet Not Asked     Seat Belt Yes     Self-Exams Not Asked   Social History Narrative    Retired       Current Outpatient Medications   Medication Sig Dispense Refill     albuterol (PROAIR HFA) 108 (90 Base) MCG/ACT inhaler Inhale 2 puffs into the lungs every 6 hours as needed for shortness of breath / dyspnea 2 Inhaler 1     betamethasone dipropionate (DIPROSONE) 0.05 % external cream Apply to trunk, arms and legs bid for 10-14 consecutive days, Not to be used on face or groin 45 g 1     calcium carbonate (OS-ZACH) 500 MG tablet Take 1 tablet (500 mg) by mouth 2 times daily       hydrOXYzine (ATARAX) 25 MG tablet 1 tab at bedtime 90 tablet 3     Lactobacillus Acidophilus (ACIDOPHILUS  "LACTOBACILLUS) POWD        LORazepam (ATIVAN) 0.5 MG tablet TAKE 1/2 TO 1 (ONE-HALF TO ONE) TABLET BY MOUTH EVERY 8 HOURS AS NEEDED FOR ANXIETY 30 tablet 0     Omega-3 Fish Oil 500 MG capsule Take 1 capsule (500 mg) by mouth daily       omeprazole (PRILOSEC) 20 MG DR capsule Take 1 capsule (20 mg) by mouth 2 times daily       phenazopyridine (PYRIDIUM) 200 MG tablet Take 1 tablet (200 mg) by mouth 3 times daily as needed for irritation 20 tablet 0     venlafaxine (EFFEXOR-XR) 150 MG 24 hr capsule Take 1 capsule (150 mg) by mouth daily 90 capsule 3     Vitamin D3 (CHOLECALCIFEROL) 25 mcg (1000 units) tablet Take 1 tablet (25 mcg) by mouth daily 90 tablet 3       Allergies   Allergen Reactions     Sulfa Drugs      Gluten Meal      Morphine Difficulty breathing       REVIEW OF SYSTEMS:  CONSTITUTIONAL:  NEGATIVE for fever, chills, change in weight  INTEGUMENTARY/SKIN:  NEGATIVE for worrisome rashes, moles or lesions  EYES:  NEGATIVE for vision changes or irritation  ENT/MOUTH:  NEGATIVE for ear, mouth and throat problems  RESP:  NEGATIVE for significant cough or SOB  BREAST:  NEGATIVE for masses, tenderness or discharge  CV:  NEGATIVE for chest pain, palpitations or peripheral edema  GI:  Ulcer, constipation, reflux   :  Negative   MUSCULOSKELETAL:  See HPI above  NEURO:  paresthesias  ENDOCRINE:  NEGATIVE for temperature intolerance, skin/hair changes  HEME/ALLERGY/IMMUNE:  NEGATIVE for bleeding problems  PSYCHIATRIC:  Depression, panic attack      PHYSICAL EXAM:  /70 (BP Location: Right arm, Patient Position: Chair)   Ht 1.556 m (5' 1.25\")   Wt 73.5 kg (162 lb)   BMI 30.36 kg/m    Body mass index is 30.36 kg/m .   GENERAL APPEARANCE: healthy, alert and no distress   HEENT: No apparent thyroid megaly. Clear sclera with normal ocular movement  RESPIRATORY: No labored breathing  SKIN: no suspicious lesions or rashes  NEURO: Normal strength and tone, mentation intact and speech normal  VASCULAR: Good pulses, " and capillary refill   LYMPH: no lymphadenopathy   PSYCH:  mentation appears normal and affect normal/bright    MUSCULOSKELETAL:  Not in acute distress  Normal gait  Mild swelling, left wrist out of the splint  Very limited supination on the left wrist even though pronation is full  Left the wrist supination is limited to no more than 15 degrees  Sensation is intact  Circulation is intact  Finger movement is well-maintained         ASSESSMENT:  Distal radius fracture with dorsal angulation  Distal volar tilt is measured to be about 18 degrees    PLAN:  We visualized the x-rays from [place] that was taken right after the injury.  Compared to the initial x-rays, the dorsal angulation appears to be slightly worse.  There is a slight radial shortening as a result of the angulatory deformity as well.  We talked about the options of further observation with a cast application versus cast application with fracture manipulation versus open reduction and internal fixation.  Pros and cons of each option were thoroughly discussed.  With understanding of our discussion, she wants to proceed with open reduction and internal fixation to preserve function and hopefully maintain full supination in the future.    We talked about potential risks of infection, possible need for hardware removal in the future as well as neurovascular compromise.  Possibility of ongoing pain was also explained.    Open reduction and external fixation of distal radius, left will be scheduled sometime near future.      Imaging Interpretation:   Distal radius fracture with a slight shortening and dorsal angulation.  The shaft and distal radius articular angle is about 18 degrees volar.    Fahad Longoria MD  Department of Orthopedic Surgery        Disclaimer: This note consists of symbols derived from keyboarding, dictation and/or voice recognition software. As a result, there may be errors in the script that have gone undetected. Please consider this when  interpreting information found in this chart.        Again, thank you for allowing me to participate in the care of your patient.        Sincerely,        aFhad Longoria MD

## 2021-03-25 NOTE — PROGRESS NOTES
HISTORY OF PRESENT ILLNESS:    Austin Shaffer is a 65 year old female who is seen in consultation at the request of Dr. Desai for left wrist fracture.  Injury occurred while in [place], approximately 2.5 weeks ago. She reports she was out for a walk and fell.     Present symptoms: left wrist pain generalized across the wrist. Slightly increased pain along the radial aspect.  She notes initial swelling, that has improved.  Occasional tingling of the thumb and index finger she felt was related to the ace bandage being too tight.   Current pain level: 2/10, Worst pain level: 4/10   Treatments tried to this point: volar resting splint, OTC pain medication  Orthopedic PMH: previous left hand fracture ~20 years ago     Past Medical History:   Diagnosis Date     Depressive disorder      Mild intermittent asthma        Past Surgical History:   Procedure Laterality Date     ABDOMEN SURGERY      ? Enteritis as a child, possibly appendix     APPENDECTOMY       COLONOSCOPY      Cant remember the date     GENITOURINARY SURGERY      Cant remember the date     HYSTERECTOMY TOTAL ABDOMINAL, BILATERAL SALPINGO-OOPHORECTOMY, COMBINED  1998     TONSILLECTOMY         Family History   Problem Relation Age of Onset     Cardiovascular Mother      Hypertension Mother      Cardiovascular Maternal Grandmother      Cancer Father         Kidney     Other Cancer Father      Myocardial Infarction Brother 60        2017     Cerebrovascular Disease Brother 63        2017     Mental Illness Brother      Diabetes Brother      Myocardial Infarction Brother      Hypertension Brother         Had bad stoke 2015     Cerebrovascular Disease Brother      Diabetes Brother         Passed away at 60     Hypertension Brother      Other Cancer Maternal Grandfather        Social History     Socioeconomic History     Marital status:      Spouse name: Not on file     Number of children: Not on file     Years of education: Not on file     Highest  education level: Not on file   Occupational History     Not on file   Social Needs     Financial resource strain: Not on file     Food insecurity     Worry: Not on file     Inability: Not on file     Transportation needs     Medical: Not on file     Non-medical: Not on file   Tobacco Use     Smoking status: Former Smoker     Packs/day: 1.00     Years: 5.00     Pack years: 5.00     Types: Cigarettes     Start date: 1969     Quit date: 3/20/1974     Years since quittin.0     Smokeless tobacco: Never Used   Substance and Sexual Activity     Alcohol use: Yes     Alcohol/week: 0.0 standard drinks     Comment: About 3-4 a mounth     Drug use: No     Sexual activity: Yes     Partners: Male     Birth control/protection: Male Surgical, Female Surgical   Lifestyle     Physical activity     Days per week: Not on file     Minutes per session: Not on file     Stress: Not on file   Relationships     Social connections     Talks on phone: Not on file     Gets together: Not on file     Attends Sikh service: Not on file     Active member of club or organization: Not on file     Attends meetings of clubs or organizations: Not on file     Relationship status: Not on file     Intimate partner violence     Fear of current or ex partner: Not on file     Emotionally abused: Not on file     Physically abused: Not on file     Forced sexual activity: Not on file   Other Topics Concern     Parent/sibling w/ CABG, MI or angioplasty before 65F 55M? No      Service Not Asked     Blood Transfusions No     Caffeine Concern No     Occupational Exposure Not Asked     Hobby Hazards Not Asked     Sleep Concern No     Stress Concern Yes     Comment: Aging mother     Weight Concern Not Asked     Special Diet Not Asked     Back Care Not Asked     Exercise Not Asked     Bike Helmet Not Asked     Seat Belt Yes     Self-Exams Not Asked   Social History Narrative    Retired       Current Outpatient Medications   Medication Sig Dispense  Refill     albuterol (PROAIR HFA) 108 (90 Base) MCG/ACT inhaler Inhale 2 puffs into the lungs every 6 hours as needed for shortness of breath / dyspnea 2 Inhaler 1     betamethasone dipropionate (DIPROSONE) 0.05 % external cream Apply to trunk, arms and legs bid for 10-14 consecutive days, Not to be used on face or groin 45 g 1     calcium carbonate (OS-ZACH) 500 MG tablet Take 1 tablet (500 mg) by mouth 2 times daily       hydrOXYzine (ATARAX) 25 MG tablet 1 tab at bedtime 90 tablet 3     Lactobacillus Acidophilus (ACIDOPHILUS LACTOBACILLUS) POWD        LORazepam (ATIVAN) 0.5 MG tablet TAKE 1/2 TO 1 (ONE-HALF TO ONE) TABLET BY MOUTH EVERY 8 HOURS AS NEEDED FOR ANXIETY 30 tablet 0     Omega-3 Fish Oil 500 MG capsule Take 1 capsule (500 mg) by mouth daily       omeprazole (PRILOSEC) 20 MG DR capsule Take 1 capsule (20 mg) by mouth 2 times daily       phenazopyridine (PYRIDIUM) 200 MG tablet Take 1 tablet (200 mg) by mouth 3 times daily as needed for irritation 20 tablet 0     venlafaxine (EFFEXOR-XR) 150 MG 24 hr capsule Take 1 capsule (150 mg) by mouth daily 90 capsule 3     Vitamin D3 (CHOLECALCIFEROL) 25 mcg (1000 units) tablet Take 1 tablet (25 mcg) by mouth daily 90 tablet 3       Allergies   Allergen Reactions     Sulfa Drugs      Gluten Meal      Morphine Difficulty breathing       REVIEW OF SYSTEMS:  CONSTITUTIONAL:  NEGATIVE for fever, chills, change in weight  INTEGUMENTARY/SKIN:  NEGATIVE for worrisome rashes, moles or lesions  EYES:  NEGATIVE for vision changes or irritation  ENT/MOUTH:  NEGATIVE for ear, mouth and throat problems  RESP:  NEGATIVE for significant cough or SOB  BREAST:  NEGATIVE for masses, tenderness or discharge  CV:  NEGATIVE for chest pain, palpitations or peripheral edema  GI:  Ulcer, constipation, reflux   :  Negative   MUSCULOSKELETAL:  See HPI above  NEURO:  paresthesias  ENDOCRINE:  NEGATIVE for temperature intolerance, skin/hair changes  HEME/ALLERGY/IMMUNE:  NEGATIVE for  "bleeding problems  PSYCHIATRIC:  Depression, panic attack      PHYSICAL EXAM:  /70 (BP Location: Right arm, Patient Position: Chair)   Ht 1.556 m (5' 1.25\")   Wt 73.5 kg (162 lb)   BMI 30.36 kg/m    Body mass index is 30.36 kg/m .   GENERAL APPEARANCE: healthy, alert and no distress   HEENT: No apparent thyroid megaly. Clear sclera with normal ocular movement  RESPIRATORY: No labored breathing  SKIN: no suspicious lesions or rashes  NEURO: Normal strength and tone, mentation intact and speech normal  VASCULAR: Good pulses, and capillary refill   LYMPH: no lymphadenopathy   PSYCH:  mentation appears normal and affect normal/bright    MUSCULOSKELETAL:  Not in acute distress  Normal gait  Mild swelling, left wrist out of the splint  Very limited supination on the left wrist even though pronation is full  Left the wrist supination is limited to no more than 15 degrees  Sensation is intact  Circulation is intact  Finger movement is well-maintained         ASSESSMENT:  Distal radius fracture with dorsal angulation  Distal volar tilt is measured to be about 18 degrees    PLAN:  We visualized the x-rays from [place] that was taken right after the injury.  Compared to the initial x-rays, the dorsal angulation appears to be slightly worse.  There is a slight radial shortening as a result of the angulatory deformity as well.  We talked about the options of further observation with a cast application versus cast application with fracture manipulation versus open reduction and internal fixation.  Pros and cons of each option were thoroughly discussed.  With understanding of our discussion, she wants to proceed with open reduction and internal fixation to preserve function and hopefully maintain full supination in the future.    We talked about potential risks of infection, possible need for hardware removal in the future as well as neurovascular compromise.  Possibility of ongoing pain was also explained.    Open " reduction and external fixation of distal radius, left will be scheduled sometime near future.      Imaging Interpretation:   Distal radius fracture with a slight shortening and dorsal angulation.  The shaft and distal radius articular angle is about 18 degrees volar.    Fahad Longoria MD  Department of Orthopedic Surgery        Disclaimer: This note consists of symbols derived from keyboarding, dictation and/or voice recognition software. As a result, there may be errors in the script that have gone undetected. Please consider this when interpreting information found in this chart.

## 2021-03-26 ENCOUNTER — OFFICE VISIT (OUTPATIENT)
Dept: FAMILY MEDICINE | Facility: CLINIC | Age: 66
End: 2021-03-26
Payer: COMMERCIAL

## 2021-03-26 ENCOUNTER — TELEPHONE (OUTPATIENT)
Dept: ORTHOPEDICS | Facility: CLINIC | Age: 66
End: 2021-03-26

## 2021-03-26 VITALS
WEIGHT: 167 LBS | BODY MASS INDEX: 31.53 KG/M2 | TEMPERATURE: 96.6 F | HEIGHT: 61 IN | DIASTOLIC BLOOD PRESSURE: 82 MMHG | HEART RATE: 94 BPM | SYSTOLIC BLOOD PRESSURE: 112 MMHG | RESPIRATION RATE: 16 BRPM | OXYGEN SATURATION: 98 %

## 2021-03-26 DIAGNOSIS — Z11.59 ENCOUNTER FOR SCREENING FOR OTHER VIRAL DISEASES: Primary | ICD-10-CM

## 2021-03-26 DIAGNOSIS — F41.9 ANXIETY: ICD-10-CM

## 2021-03-26 DIAGNOSIS — Z01.818 PREOPERATIVE EXAMINATION: Primary | ICD-10-CM

## 2021-03-26 DIAGNOSIS — S52.502P CLOSED FRACTURE OF DISTAL END OF LEFT RADIUS WITH MALUNION, UNSPECIFIED FRACTURE MORPHOLOGY, SUBSEQUENT ENCOUNTER: ICD-10-CM

## 2021-03-26 LAB
ERYTHROCYTE [DISTWIDTH] IN BLOOD BY AUTOMATED COUNT: 12.9 % (ref 10–15)
HCT VFR BLD AUTO: 41.9 % (ref 35–47)
HGB BLD-MCNC: 13.6 G/DL (ref 11.7–15.7)
MCH RBC QN AUTO: 29.5 PG (ref 26.5–33)
MCHC RBC AUTO-ENTMCNC: 32.5 G/DL (ref 31.5–36.5)
MCV RBC AUTO: 91 FL (ref 78–100)
PLATELET # BLD AUTO: 328 10E9/L (ref 150–450)
RBC # BLD AUTO: 4.61 10E12/L (ref 3.8–5.2)
WBC # BLD AUTO: 7.7 10E9/L (ref 4–11)

## 2021-03-26 PROCEDURE — 36415 COLL VENOUS BLD VENIPUNCTURE: CPT | Performed by: NURSE PRACTITIONER

## 2021-03-26 PROCEDURE — 85027 COMPLETE CBC AUTOMATED: CPT | Performed by: NURSE PRACTITIONER

## 2021-03-26 PROCEDURE — 99214 OFFICE O/P EST MOD 30 MIN: CPT | Performed by: NURSE PRACTITIONER

## 2021-03-26 RX ORDER — HYDROXYZINE HYDROCHLORIDE 10 MG/1
10-30 TABLET, FILM COATED ORAL
Qty: 45 TABLET | Refills: 1 | Status: SHIPPED | OUTPATIENT
Start: 2021-03-26 | End: 2022-05-12

## 2021-03-26 ASSESSMENT — PATIENT HEALTH QUESTIONNAIRE - PHQ9
SUM OF ALL RESPONSES TO PHQ QUESTIONS 1-9: 5
5. POOR APPETITE OR OVEREATING: NOT AT ALL

## 2021-03-26 ASSESSMENT — ANXIETY QUESTIONNAIRES
7. FEELING AFRAID AS IF SOMETHING AWFUL MIGHT HAPPEN: NOT AT ALL
1. FEELING NERVOUS, ANXIOUS, OR ON EDGE: SEVERAL DAYS
GAD7 TOTAL SCORE: 3
3. WORRYING TOO MUCH ABOUT DIFFERENT THINGS: NOT AT ALL
5. BEING SO RESTLESS THAT IT IS HARD TO SIT STILL: NOT AT ALL
IF YOU CHECKED OFF ANY PROBLEMS ON THIS QUESTIONNAIRE, HOW DIFFICULT HAVE THESE PROBLEMS MADE IT FOR YOU TO DO YOUR WORK, TAKE CARE OF THINGS AT HOME, OR GET ALONG WITH OTHER PEOPLE: NOT DIFFICULT AT ALL
2. NOT BEING ABLE TO STOP OR CONTROL WORRYING: SEVERAL DAYS
6. BECOMING EASILY ANNOYED OR IRRITABLE: SEVERAL DAYS

## 2021-03-26 ASSESSMENT — MIFFLIN-ST. JEOR: SCORE: 1233.85

## 2021-03-26 NOTE — RESULT ENCOUNTER NOTE
Dear Austin,     -Normal red blood cell (hgb) levels, normal white blood cell count and normal platelet levels.      Please send a [employer] message or call [phone]  if you have any questions.      TANYA Tavera, [facility]    If you have further questions about the interpretation of your labs, [email or web address] is a good website to check out for further information.

## 2021-03-26 NOTE — TELEPHONE ENCOUNTER
Scheduled surgery    ATC/Triage: please place covid order.      Type of surgery: open reduction and internal fixation left wrist  Location of surgery: Other: [facility]  Date and time of surgery: 3/31/21 @ 145pm  Surgeon: Swati  Pre-Op Appt Date:  3/26/21  Post-Op Appt Date: 4/9/21   Packet sent out: Yes  Pre-cert/Authorization completed:  No  Date: 3/26/21      Christina Araujo, Surgery Scheduler

## 2021-03-26 NOTE — PROGRESS NOTES
[address] [address]  Phone: [phone]  Primary Provider: Alex Desai  Pre-op Performing Provider: ALEX DESAI    PREOPERATIVE EVALUATION:  Today's date: 3/26/2021    Austin Shaffer is a 65 year old female who presents for a preoperative evaluation.    Surgical Information:  Surgery/Procedure: open reduction and internal fixation left wrist  Surgery Location: [facility]  Surgeon: Dr. Swati Araujo   Surgery Date: 3/31/21  Time of Surgery: 1:45 PM  Where patient plans to recover: At home with family  Fax number for surgical facility: Fax to [phone]    Type of Anesthesia Anticipated: General    Assessment & Plan     The proposed surgical procedure is considered INTERMEDIATE risk.  Austin was seen today for pre-op exam.    Diagnoses and all orders for this visit:    Preoperative examination  -     CBC with platelets    Closed fracture of distal end of left radius with malunion, unspecified fracture morphology, subsequent encounter    Anxiety  -     hydrOXYzine (ATARAX) 10 MG tablet; Take 1-3 tablets (10-30 mg) by mouth nightly as needed for anxiety (insomnia)          Risks and Recommendations:  The patient has the following additional risks and recommendations for perioperative complications:   - No identified additional risk factors other than previously addressed    Medication Instructions:  Patient may take all scheduled medications on the day of surgery    RECOMMENDATION:  APPROVAL GIVEN to proceed with proposed procedure, without further diagnostic evaluation.          Subjective     HPI related to upcoming procedure:       Patient with history of distal radius fracture with dorsal angulation, original injury occurred 2.5 weeks ago while traveling home from [place].  Was walking through a parking lot, tripped and fell on outstretched left arm.  Planning ORIF left wrist fracture with Dr. Longoria.        1. No - Have you ever  had a heart attack or stroke?  2. No - Have you ever had surgery on your heart or blood vessels, such as a stent, coronary (heart) bypass, or surgery on an artery in the head, neck, heart, or legs?  3. No - Do you have chest pain when you are physically active?  4. No - Do you have a history of heart failure?  5. No - Do you currently have a cold, bronchitis, or symptoms of other respiratory (head and chest) infections?  6. No - Do you have a cough, shortness of breath, or wheezing?  7. No - Do you or anyone in your family have a history of blood clots?  8. No - Do you or anyone in your family have a serious bleeding problem, such as long-lasting bleeding after surgeries or cuts?  9. No - Have you ever had anemia or been told to take iron pills?  10. No - Have you had any abnormal blood loss such as black, tarry or bloody stools, or abnormal vaginal bleeding?  11. No - Have you ever had a blood transfusion?  12. NO - Are you willing to have a blood transfusion if it is medically needed before, during, or after your surgery?  13. No - Have you or anyone in your family ever had problems with anesthesia (sedation for surgery)?  14. No - Do you have sleep apnea, excessive snoring, or daytime drowsiness?   15. No - Do you have any artifical heart valves or other implanted medical devices, such as a pacemaker, defibrillator, or continuous glucose monitor?  16. No - Do you have any artifical joints?  17. No - Are you allergic to latex?  18. No - Is there any chance that you may be pregnant?    Health Care Directive:  Patient does not have a Health Care Directive or Living Will: Discussed advance care planning with patient; however, patient declined at this time.    Preoperative Review of :   reviewed - controlled substances reflected in medication list.      Status of Chronic Conditions:  See problem list for active medical problems.  Problems all longstanding and stable, except as noted/documented.  See ROS for  pertinent symptoms related to these conditions.      Review of Systems  CONSTITUTIONAL: NEGATIVE for fever, chills, change in weight  INTEGUMENTARY/SKIN: NEGATIVE for worrisome rashes, moles or lesions  EYES: NEGATIVE for vision changes or irritation  ENT/MOUTH: NEGATIVE for ear, mouth and throat problems  RESP: NEGATIVE for significant cough or SOB  BREAST: NEGATIVE for masses, tenderness or discharge  CV: NEGATIVE for chest pain, palpitations or peripheral edema  GI: NEGATIVE for nausea, abdominal pain, heartburn, or change in bowel habits  : NEGATIVE for frequency, dysuria, or hematuria  MUSCULOSKELETAL: NEGATIVE for significant arthralgias or myalgia, POSITIVE for left wrist pain  NEURO: NEGATIVE for weakness, dizziness or paresthesias  ENDOCRINE: NEGATIVE for temperature intolerance, skin/hair changes  HEME: NEGATIVE for bleeding problems  PSYCHIATRIC: NEGATIVE for changes in mood or affect  POSITIVE for history of anxiety,  Improved with dose adjustment of Effexor- mg daily.   Does have difficulty with insomnia and will take Lorazepam at bedtime as needed for anxiety and sleep.      Patient Active Problem List    Diagnosis Date Noted     Age-related osteoporosis without current pathological fracture 10/21/2019     Priority: Medium     Found on DEXA scan in October 2019:  FINDINGS:   Lumbar spine: The T-score is -3.3 between L1 and L4.      Hips:  The right hip femoral neck T-score is -2.7. The left hip  femoral neck T-score is -2.5.   Bone mineral density in the worst hip  is 0.663 gm/cm2.                                                                       IMPRESSION:  1. Osteoporosis.  2. The probability of major osteoporotic fracture is 14.3% and  probability of hip fracture is 3.5% within the next 10 years according  to FRAX risk assessment       Celiac disease 10/03/2019     Priority: Medium     Overflow diarrhea 08/26/2019     Priority: Medium     Seen on colonoscopy August 2019       Vitamin D  deficiency 10/08/2015     Priority: Medium     Anxiety 06/20/2013     Priority: Medium     RX [place] ([place] reviewed:  reviewed- no concerns 11/10/2020    [place]:  [email or web address]         Advanced directives, counseling/discussion 03/27/2013     Priority: Medium     Pt declined at this time.       Moderate recurrent major depression (H) 06/01/2012     Priority: Medium     Intermittent asthma 06/01/2012     Priority: Medium     CARDIOVASCULAR SCREENING; LDL GOAL LESS THAN 160 06/01/2012     Priority: Medium      Past Medical History:   Diagnosis Date     Depressive disorder      Mild intermittent asthma      Past Surgical History:   Procedure Laterality Date     ABDOMEN SURGERY      ? Enteritis as a child, possibly appendix     APPENDECTOMY       COLONOSCOPY      Cant remember the date     GENITOURINARY SURGERY      Cant remember the date     HYSTERECTOMY TOTAL ABDOMINAL, BILATERAL SALPINGO-OOPHORECTOMY, COMBINED  1998     TONSILLECTOMY       Current Outpatient Medications   Medication Sig Dispense Refill     hydrOXYzine (ATARAX) 10 MG tablet Take 1-3 tablets (10-30 mg) by mouth nightly as needed for anxiety (insomnia) 45 tablet 1     albuterol (PROAIR HFA) 108 (90 Base) MCG/ACT inhaler Inhale 2 puffs into the lungs every 6 hours as needed for shortness of breath / dyspnea 2 Inhaler 1     betamethasone dipropionate (DIPROSONE) 0.05 % external cream Apply to trunk, arms and legs bid for 10-14 consecutive days, Not to be used on face or groin 45 g 1     calcium carbonate (OS-ZACH) 500 MG tablet Take 1 tablet (500 mg) by mouth 2 times daily       Lactobacillus Acidophilus (ACIDOPHILUS LACTOBACILLUS) POWD        LORazepam (ATIVAN) 0.5 MG tablet TAKE 1/2 TO 1 (ONE-HALF TO ONE) TABLET BY MOUTH EVERY 8 HOURS AS NEEDED FOR ANXIETY 30 tablet 0     Omega-3 Fish Oil 500 MG capsule Take 1 capsule (500 mg) by mouth daily       omeprazole (PRILOSEC) 20 MG DR capsule Take 1 capsule (20 mg) by  "mouth 2 times daily       phenazopyridine (PYRIDIUM) 200 MG tablet Take 1 tablet (200 mg) by mouth 3 times daily as needed for irritation 20 tablet 0     venlafaxine (EFFEXOR-XR) 150 MG 24 hr capsule Take 1 capsule (150 mg) by mouth daily 90 capsule 3     Vitamin D3 (CHOLECALCIFEROL) 25 mcg (1000 units) tablet Take 1 tablet (25 mcg) by mouth daily 90 tablet 3       Allergies   Allergen Reactions     Sulfa Drugs      Gluten Meal      Morphine Difficulty breathing        Social History     Tobacco Use     Smoking status: Former Smoker     Packs/day: 1.00     Years: 5.00     Pack years: 5.00     Types: Cigarettes     Start date: 1969     Quit date: 3/20/1974     Years since quittin.0     Smokeless tobacco: Never Used   Substance Use Topics     Alcohol use: Yes     Alcohol/week: 0.0 standard drinks     Comment: About 3-4 a [facility]     Family History   Problem Relation Age of Onset     Cardiovascular Mother      Hypertension Mother      Cardiovascular Maternal Grandmother      Cancer Father         Kidney     Other Cancer Father      Myocardial Infarction Brother 60        2017     Cerebrovascular Disease Brother 63        2017     Mental Illness Brother      Diabetes Brother      Myocardial Infarction Brother      Hypertension Brother         Had bad stoke      Cerebrovascular Disease Brother      Diabetes Brother         Passed away at 60     Hypertension Brother      Other Cancer Maternal Grandfather      History   Drug Use No         Objective     /82   Pulse 94   Temp 96.6  F (35.9  C)   Resp 16   Ht 1.556 m (5' 1.25\")   Wt 75.8 kg (167 lb)   SpO2 98%   BMI 31.30 kg/m      Physical Exam     GENERAL APPEARANCE: healthy, alert and no distress     EYES: PERRL     HENT: ear canals and TM's normal     NECK: no adenopathy, no asymmetry, masses, or scars and thyroid normal to palpation     RESP: lungs clear to auscultation - no rales, rhonchi or wheezes     CV: regular rates and rhythm, normal S1 " S2, no S3 or S4 and no murmur, click or rub     ABDOMEN:  soft, nontender, no HSM or masses and bowel sounds normal     MS: extremities normal- no gross deformities noted     SKIN: no suspicious lesions or rashes     NEURO: Normal strength and tone, sensory exam grossly normal, mentation intact and speech normal     PSYCH: mentation appears normal. and affect normal/bright     LYMPHATICS: No cervical adenopathy    Recent Labs   Lab Test 10/28/20  1051 07/16/19  0943   HGB 14.2 14.8    318    139   POTASSIUM 4.6 4.2   CR 0.70 0.86        Diagnostics:  Labs pending at this time.  Results will be reviewed when available.   No EKG required, no history of coronary heart disease, significant arrhythmia, peripheral arterial disease or other structural heart disease.    Revised Cardiac Risk Index (RCRI):  The patient has the following serious cardiovascular risks for perioperative complications:   - No serious cardiac risks = 0 points     RCRI Interpretation: 0 points: Class I (very low risk - 0.4% complication rate)           Signed Electronically by: TANYA Tavera CNP  Copy of this evaluation report is provided to requesting physician.

## 2021-03-27 ASSESSMENT — ASTHMA QUESTIONNAIRES: ACT_TOTALSCORE: 25

## 2021-03-27 ASSESSMENT — ANXIETY QUESTIONNAIRES: GAD7 TOTAL SCORE: 3

## 2021-03-28 ENCOUNTER — HOSPITAL ENCOUNTER (OUTPATIENT)
Dept: LAB | Facility: CLINIC | Age: 66
Discharge: HOME | End: 2021-03-28
Attending: ORTHOPAEDIC SURGERY | Admitting: ORTHOPAEDIC SURGERY
Payer: COMMERCIAL

## 2021-03-28 DIAGNOSIS — Z11.59 ENCOUNTER FOR SCREENING FOR OTHER VIRAL DISEASES: ICD-10-CM

## 2021-03-28 LAB
SARS-COV-2 RNA RESP QL NAA+PROBE: NORMAL
SPECIMEN SOURCE: NORMAL

## 2021-03-28 PROCEDURE — U0005 INFEC AGEN DETEC AMPLI PROBE: HCPCS | Performed by: ORTHOPAEDIC SURGERY

## 2021-03-28 PROCEDURE — U0003 INFECTIOUS AGENT DETECTION BY NUCLEIC ACID (DNA OR RNA); SEVERE ACUTE RESPIRATORY SYNDROME CORONAVIRUS 2 (SARS-COV-2) (CORONAVIRUS DISEASE [COVID-19]), AMPLIFIED PROBE TECHNIQUE, MAKING USE OF HIGH THROUGHPUT TECHNOLOGIES AS DESCRIBED BY CMS-2020-01-R: HCPCS | Performed by: ORTHOPAEDIC SURGERY

## 2021-03-29 LAB
LABORATORY COMMENT REPORT: NORMAL
SARS-COV-2 RNA RESP QL NAA+PROBE: NEGATIVE
SPECIMEN SOURCE: NORMAL

## 2021-03-31 ENCOUNTER — TRANSFERRED RECORDS (OUTPATIENT)
Dept: HEALTH INFORMATION MANAGEMENT | Facility: CLINIC | Age: 66
End: 2021-03-31

## 2021-04-09 ENCOUNTER — OFFICE VISIT (OUTPATIENT)
Dept: ORTHOPEDICS | Facility: CLINIC | Age: 66
End: 2021-04-09
Payer: COMMERCIAL

## 2021-04-09 DIAGNOSIS — S52.552A OTHER CLOSED EXTRA-ARTICULAR FRACTURE OF DISTAL END OF LEFT RADIUS, INITIAL ENCOUNTER: ICD-10-CM

## 2021-04-09 DIAGNOSIS — Z47.89 ORTHOPEDIC AFTERCARE: Primary | ICD-10-CM

## 2021-04-09 PROCEDURE — 99024 POSTOP FOLLOW-UP VISIT: CPT | Performed by: PHYSICIAN ASSISTANT

## 2021-04-09 NOTE — PATIENT INSTRUCTIONS
Incision Care:  Sutures were removed and Steri-Strips applied in usual fashion.  Keep dry 24-48 hours.  Showering ok after that time, however no soaking or scrubbing of incision for 1 weeks.  Steri-strips will most likely fall off on their own, however they may be removed after 1 weeks with rubbing alcohol if they have not.    If draining or bleeding stops the tape-strips are enough coverage unless you were instructed otherwise or you would like to cover for comfort.  If drainage or bleeding continues please cover with clean dressings.     Gradually increase your activities as you can tolerated them, starting at a level well below what you would normally do.  You may use the hand for lifting up to 1 lb.     Brace: to be worn whenever up and moving around.  Remove for hygiene and exercises when seated.    Exercises minimum 2 times daily.  -remove brace when seated.  -flex wrist towards palp and back of hand, holding for a second at each end, 10 times.  - move wrist towards thumb and pinky, holding for a second at each end, 10 times.  - move wrist in [place] as large as possible both directions, 5 times.  - repeat the above with palm up and palm facing down.    Scar tissue may develop and be present at or deep to the incision site for several weeks to months which may feel like a lump. Gentle massage to the area when tolerated may help reduce this.   Also the top layers of skin may peel away over the next weeks.    Follow up in 3 weeks for an x ray.

## 2021-04-12 NOTE — PROGRESS NOTES
HISTORY OF PRESENT ILLNESS:    Austin Shaffer is a 65 year old female who is seen in follow up for Left wrist ORIF, DOS 03/31/2021, DR. Longoria.  Present symptoms: pt reports minimal pain, main complaint is tight in splint and swelling at fingers.  Maintained splint, using sling when out of house, limiting use of hand to very light.  Is taking some OTC PRN and elevating and icing.  Denies Chest pain, Calve pain, Fever, Chills.    Current Treatment: Post op, splint.    PHYSICAL EXAM:  There were no vitals taken for this visit.  There is no height or weight on file to calculate BMI.   GENERAL APPEARANCE: healthy, alert and no distress   PSYCH:  mentation appears normal and affect normal/bright    MSK:  Left: Wrist .  Presents in well maintained splint.  Incision clean and dry, staples present, healing.  Appropriate incisional erythema.   Yes Ecchymosis mild resolving at forearm..  Edema Mild to moderate at fingers.  CMS: elder incisional numbness, otherwise grossly intact.  AROM Wrist very limited due to pain, cannot full  due to swelling.      IMAGING INTERPRETATION: None today.     ASSESSMENT:  Austin Shaffer is a 65 year old female S/P Left wrist ORIF, DOS 03/31/2021, DR. Longoria.    Pain sensitive, became painful to point of presyncopal and diaphoretic when removing staples.    PLAN:  - Surgery discussed, images reviewed if applicable, and all questions were answered at this time.  - splint and staples removed with sterile technique, steri-strips applied in usual fashion, care instructions given and verbally acknowledged.  - Medications: OTC PRN.  - Home exercise program as instructed- gentle ROM  - Brace fitted and dispensed, wear when not exercising or hygiene when seated.    Return to clinic 3, weeks, for x rays.    Dallas Esqueda PA-C    Dept. Orthopedic Surgery  [facility]   4/12/2021

## 2021-04-29 ENCOUNTER — OFFICE VISIT (OUTPATIENT)
Dept: ORTHOPEDICS | Facility: CLINIC | Age: 66
End: 2021-04-29
Payer: COMMERCIAL

## 2021-04-29 ENCOUNTER — ANCILLARY PROCEDURE (OUTPATIENT)
Dept: GENERAL RADIOLOGY | Facility: CLINIC | Age: 66
End: 2021-04-29
Attending: ORTHOPAEDIC SURGERY
Payer: COMMERCIAL

## 2021-04-29 DIAGNOSIS — S52.552A OTHER CLOSED EXTRA-ARTICULAR FRACTURE OF DISTAL END OF LEFT RADIUS, INITIAL ENCOUNTER: ICD-10-CM

## 2021-04-29 DIAGNOSIS — Z47.89 ORTHOPEDIC AFTERCARE: Primary | ICD-10-CM

## 2021-04-29 DIAGNOSIS — Z47.89 ORTHOPEDIC AFTERCARE: ICD-10-CM

## 2021-04-29 PROCEDURE — 73110 X-RAY EXAM OF WRIST: CPT | Mod: LT | Performed by: ORTHOPAEDIC SURGERY

## 2021-04-29 PROCEDURE — 99024 POSTOP FOLLOW-UP VISIT: CPT | Performed by: ORTHOPAEDIC SURGERY

## 2021-04-29 NOTE — PROGRESS NOTES
Subjective:  Austin Shaffer is a 65 year old female who is seen in f/u up for Left wrist ORIF, DOS 03/31/2021. She is 4 weeks s/p the above procedure.  Overall, she is doing well.  She reports continued improvement with pain each day. Occasional numbness of the thumb and index finger with elevation and exercises.  No use of OTC pain relievers or anti-inflammatories.      Objective:   Not in acute distress  Normal gait    Mild persisting swelling of the left wrist  Volar incision is healed well  Limited range of motion but improved compared to before  Finger range of motion is basically full  Small patch of numbness in the palm region  Fingertip sensation is intact  Finger is not swollen      Imaging studies:   Well-healing and well-maintained distal radius with no evidence of failure of hardware        Assessment:   Status post open reduction and internal fixation of distal radius fracture      Plan:   The x-ray images from today were visualized.  Anatomic alignment was felt to be restored from the surgery.  She can be out of the splint at this point and be little more aggressive with range of motion and strengthening.  She may benefit from couple visits of hand therapy to help her with the regaining of the motion and strength.  If having goes well, we can see her back as needed.        Fahad Longoria MD  Dept. Orthopedic Surgery  [facility]       Disclaimer: This note consists of symbols derived from keyboarding, dictation and/or voice recognition software. As a result, there may be errors in the script that have gone undetected. Please consider this when interpreting information found in this chart.

## 2021-04-29 NOTE — LETTER
4/29/2021         RE: Austin Shaffer  [address] [address] [facility] [address] [address]        Dear Colleague,    Thank you for referring your patient, Austin Shaffer, to the [facility]. Please see a copy of my visit note below.    Subjective:  Austin Shaffer is a 65 year old female who is seen in f/u up for Left wrist ORIF, DOS 03/31/2021. She is 4 weeks s/p the above procedure.  Overall, she is doing well.  She reports continued improvement with pain each day. Occasional numbness of the thumb and index finger with elevation and exercises.  No use of OTC pain relievers or anti-inflammatories.      Objective:   Not in acute distress  Normal gait    Mild persisting swelling of the left wrist  Volar incision is healed well  Limited range of motion but improved compared to before  Finger range of motion is basically full  Small patch of numbness in the palm region  Fingertip sensation is intact  Finger is not swollen      Imaging studies:   Well-healing and well-maintained distal radius with no evidence of failure of hardware        Assessment:   Status post open reduction and internal fixation of distal radius fracture      Plan:   The x-ray images from today were visualized.  Anatomic alignment was felt to be restored from the surgery.  She can be out of the splint at this point and be little more aggressive with range of motion and strengthening.  She may benefit from couple visits of hand therapy to help her with the regaining of the motion and strength.  If having goes well, we can see her back as needed.        Fahad Longoria MD  Dept. Orthopedic Surgery  [facility]       Disclaimer: This note consists of symbols derived from keyboarding, dictation and/or voice recognition software. As a result, there may be errors in the script that have gone undetected. Please consider this when interpreting information found in this chart.        Again, thank you for allowing  me to participate in the care of your patient.        Sincerely,        Fahad Longoria MD

## 2021-06-07 ENCOUNTER — IMMUNIZATION (OUTPATIENT)
Dept: LAB | Facility: CLINIC | Age: 66
End: 2021-06-07
Payer: COMMERCIAL

## 2021-06-07 ENCOUNTER — TELEPHONE (OUTPATIENT)
Dept: FAMILY MEDICINE | Facility: CLINIC | Age: 66
End: 2021-06-07

## 2021-06-07 NOTE — TELEPHONE ENCOUNTER
Appointment For: Austin Shaffer ([id number])  Visit Type: COVID-19 VACCINE INITIAL (4000)     6/7/2021     3:40 PM  20 mins.  RV COVID VACCINE INITIAL [facility]     Patient Comments:  Will I get sick from this COVID shot  _________________________________________________________      My chart message sent       Lianet Mora RN, BSN  [facility]

## 2021-06-16 ENCOUNTER — E-VISIT (OUTPATIENT)
Dept: FAMILY MEDICINE | Facility: CLINIC | Age: 66
End: 2021-06-16
Payer: COMMERCIAL

## 2021-06-16 DIAGNOSIS — R50.9 FEVER AND CHILLS: Primary | ICD-10-CM

## 2021-06-16 PROCEDURE — 99207 PR NON-BILLABLE SERV PER CHARTING: CPT

## 2021-06-16 NOTE — TELEPHONE ENCOUNTER
Called #   Telephone Information:   Mobile [phone]       Pt stating that this is the 5th time she is having these symptoms  - she has had in the past headaches, nausea and vomiting     Acute Illness   Acute illness concerns: fevers and chills   Onset: 6/14/2021    Fever: YES- not currently but she has had 100-102 the last few days         Chills/Sweats: YES- both     Headache (location?): no     Sinus Pressure:YES- facial pain this was only on SUNDAY    Conjunctivitis:  no    Ear Pain: no    Rhinorrhea: no     Congestion: no     Sore Throat: no      Cough: no    Wheeze: no     Decreased Appetite: YES- sometimes     Nausea: no     Vomiting: no     Diarrhea:  no     Dysuria/Freq.: no     Fatigue/Achiness: YES- BOTH     Sick/Strep Exposure: no     When ever she gets sick her mind does not work as well     Therapies Tried and outcome: tylenol, ibuprofen, ice, warm showers and baths             RN advised pt that she should be seen in clinic - made an OV for tomorrow - reviewed worsening symptoms with pt and if they were to happen to go to the ER     Patient stated an understanding and agreed with plan.    Lianet Mora RN, BSN  [facility] Triage

## 2021-06-17 ENCOUNTER — OFFICE VISIT (OUTPATIENT)
Dept: FAMILY MEDICINE | Facility: CLINIC | Age: 66
End: 2021-06-17
Payer: COMMERCIAL

## 2021-06-17 VITALS
HEART RATE: 92 BPM | SYSTOLIC BLOOD PRESSURE: 128 MMHG | WEIGHT: 167 LBS | DIASTOLIC BLOOD PRESSURE: 76 MMHG | BODY MASS INDEX: 31.3 KG/M2 | RESPIRATION RATE: 22 BRPM | TEMPERATURE: 98.2 F | OXYGEN SATURATION: 98 %

## 2021-06-17 DIAGNOSIS — R82.90 NONSPECIFIC FINDING ON EXAMINATION OF URINE: ICD-10-CM

## 2021-06-17 DIAGNOSIS — F33.1 MODERATE RECURRENT MAJOR DEPRESSION (H): ICD-10-CM

## 2021-06-17 DIAGNOSIS — R50.9 FEVER AND CHILLS: Primary | ICD-10-CM

## 2021-06-17 LAB
ALBUMIN SERPL-MCNC: 3.5 G/DL (ref 3.4–5)
ALBUMIN UR-MCNC: NEGATIVE MG/DL
ALP SERPL-CCNC: 105 U/L (ref 40–150)
ALT SERPL W P-5'-P-CCNC: 25 U/L (ref 0–50)
ANION GAP SERPL CALCULATED.3IONS-SCNC: 5 MMOL/L (ref 3–14)
APPEARANCE UR: CLEAR
AST SERPL W P-5'-P-CCNC: 20 U/L (ref 0–45)
BACTERIA #/AREA URNS HPF: ABNORMAL /HPF
BILIRUB SERPL-MCNC: 0.3 MG/DL (ref 0.2–1.3)
BILIRUB UR QL STRIP: NEGATIVE
BUN SERPL-MCNC: 8 MG/DL (ref 7–30)
CALCIUM SERPL-MCNC: 9.5 MG/DL (ref 8.5–10.1)
CHLORIDE SERPL-SCNC: 105 MMOL/L (ref 94–109)
CO2 SERPL-SCNC: 29 MMOL/L (ref 20–32)
COLOR UR AUTO: YELLOW
CREAT SERPL-MCNC: 0.82 MG/DL (ref 0.52–1.04)
CRP SERPL-MCNC: 40 MG/L (ref 0–8)
ERYTHROCYTE [DISTWIDTH] IN BLOOD BY AUTOMATED COUNT: 12.9 % (ref 10–15)
ERYTHROCYTE [SEDIMENTATION RATE] IN BLOOD BY WESTERGREN METHOD: 35 MM/H (ref 0–30)
GFR SERPL CREATININE-BSD FRML MDRD: 74 ML/MIN/{1.73_M2}
GLUCOSE SERPL-MCNC: 127 MG/DL (ref 70–99)
GLUCOSE UR STRIP-MCNC: NEGATIVE MG/DL
HCT VFR BLD AUTO: 40.7 % (ref 35–47)
HGB BLD-MCNC: 13.4 G/DL (ref 11.7–15.7)
HGB UR QL STRIP: ABNORMAL
KETONES UR STRIP-MCNC: NEGATIVE MG/DL
LEUKOCYTE ESTERASE UR QL STRIP: ABNORMAL
MCH RBC QN AUTO: 29 PG (ref 26.5–33)
MCHC RBC AUTO-ENTMCNC: 32.9 G/DL (ref 31.5–36.5)
MCV RBC AUTO: 88 FL (ref 78–100)
NITRATE UR QL: POSITIVE
NON-SQ EPI CELLS #/AREA URNS LPF: ABNORMAL /LPF
PH UR STRIP: 5.5 PH (ref 5–7)
PLATELET # BLD AUTO: 301 10E9/L (ref 150–450)
POTASSIUM SERPL-SCNC: 4.2 MMOL/L (ref 3.4–5.3)
PROT SERPL-MCNC: 7.7 G/DL (ref 6.8–8.8)
RBC # BLD AUTO: 4.62 10E12/L (ref 3.8–5.2)
RBC #/AREA URNS AUTO: ABNORMAL /HPF
SODIUM SERPL-SCNC: 139 MMOL/L (ref 133–144)
SOURCE: ABNORMAL
SP GR UR STRIP: 1.02 (ref 1–1.03)
TSH SERPL DL<=0.005 MIU/L-ACNC: 1.62 MU/L (ref 0.4–4)
UROBILINOGEN UR STRIP-ACNC: 0.2 EU/DL (ref 0.2–1)
WBC # BLD AUTO: 6.2 10E9/L (ref 4–11)
WBC #/AREA URNS AUTO: ABNORMAL /HPF

## 2021-06-17 PROCEDURE — 87186 SC STD MICRODIL/AGAR DIL: CPT | Performed by: NURSE PRACTITIONER

## 2021-06-17 PROCEDURE — 87088 URINE BACTERIA CULTURE: CPT | Performed by: NURSE PRACTITIONER

## 2021-06-17 PROCEDURE — 84443 ASSAY THYROID STIM HORMONE: CPT | Performed by: NURSE PRACTITIONER

## 2021-06-17 PROCEDURE — 85652 RBC SED RATE AUTOMATED: CPT | Performed by: NURSE PRACTITIONER

## 2021-06-17 PROCEDURE — 36415 COLL VENOUS BLD VENIPUNCTURE: CPT | Performed by: NURSE PRACTITIONER

## 2021-06-17 PROCEDURE — 86140 C-REACTIVE PROTEIN: CPT | Performed by: NURSE PRACTITIONER

## 2021-06-17 PROCEDURE — 85027 COMPLETE CBC AUTOMATED: CPT | Performed by: NURSE PRACTITIONER

## 2021-06-17 PROCEDURE — 87086 URINE CULTURE/COLONY COUNT: CPT | Performed by: NURSE PRACTITIONER

## 2021-06-17 PROCEDURE — 86618 LYME DISEASE ANTIBODY: CPT | Performed by: NURSE PRACTITIONER

## 2021-06-17 PROCEDURE — 81001 URINALYSIS AUTO W/SCOPE: CPT | Performed by: NURSE PRACTITIONER

## 2021-06-17 PROCEDURE — 99214 OFFICE O/P EST MOD 30 MIN: CPT | Performed by: NURSE PRACTITIONER

## 2021-06-17 PROCEDURE — 80053 COMPREHEN METABOLIC PANEL: CPT | Performed by: NURSE PRACTITIONER

## 2021-06-17 NOTE — PROGRESS NOTES
"    Assessment & Plan     Austin was seen today for uri.    Diagnoses and all orders for this visit:    Episodes of Fever and chills  History of 5 episodes of fever/chills, body aches, joint pain since the end of February.    Proceed with labs and urinalysis as below.    Current unknown etiology.  Notify of results once available.    -     CBC with platelets  -     Comprehensive metabolic panel (BMP + Alb, Alk Phos, ALT, AST, Total. Bili, TP)  -     ESR: Erythrocyte sedimentation rate  -     CRP, inflammation  -     UA reflex to Microscopic and Culture  -     Lyme Disease Fide with reflex to WB Serum  -     TSH with free T4 reflex  -     Urine Microscopic    Nonspecific finding on examination of urine  -     Urine Culture Aerobic Bacterial - pending    Moderate recurrent major depression (H)  PHQ 5/22/2020 10/17/2020 3/26/2021   PHQ-9 Total Score 9 8 5   Q9: Thoughts of better off dead/self-harm past 2 weeks Not at all Not at all Not at all     ELEAZAR-7 SCORE 5/22/2020 10/17/2020 3/26/2021   Total Score - - -   Total Score 8 (mild anxiety) 5 (mild anxiety) -   Total Score 8 5 3   Currently adequately controlled on Effexor- mg daily.        BMI:   Estimated body mass index is 31.3 kg/m  as calculated from the following:    Height as of 3/26/21: 1.556 m (5' 1.25\").    Weight as of this encounter: 75.8 kg (167 lb).     Return in about 2 weeks (around 7/1/2021) for No improvement or sooner with worsening symptoms.    Dunia Desai, TANYA CNP  [facility]   Austin is a 65 year old who presents for the following health issues: fevers on and off.    HPI     Acute Illness    High fevers of 102, chills/sweats, body aches - while in [place] near the end of February.   Was not seen at a clinic at that time.  COVID-19 test was negative.     5 episodes.   1st episode was 2 weeks, 2nd episode was for 1 week and each got a little shorter.     Last episode started on Sunday - had " temperature of  102, Body aches, sweats, chills.  +joint pain and body aches.  This episode lasted 3 days.  Symptoms resolve spontaneously and then feels normal.     4 COVID-19 tests were all negative.      Did have vomiting/diarrhea with previous episode.      No known tick bites.    No chest pain, shortness of breath, palpitations.  No urinary symptoms, nausea or vomiting, abdominal pain.      Last week went to have x-ray completed on left wrist and got lightheaded after looking at x-ray.  Syncopal episode while in orthopedic clinic.    No further syncope episode since that time.      COVID-19 vaccine 2 weeks ago.      Acute illness concerns: URI symptoms  Onset/Duration: since about March  Symptoms:  Fever: YES  Chills/Sweats: YES  Headache (location?): YES  Sinus Pressure: no  Conjunctivitis:  no  Ear Pain: no  Rhinorrhea: no  Congestion: no  Sore Throat: no  Cough: no  Wheeze: no  Decreased Appetite: YES  Nausea: no  Vomiting: no  Diarrhea: no  Dysuria/Freq.: no  Dysuria or Hematuria: YES  Fatigue/Achiness: no  Sick/Strep Exposure: no  Therapies tried and outcome: tried allergy pills, increase fluids and Ibuprofen      Review of Systems   Constitutional, HEENT, cardiovascular, pulmonary, gi and gu systems are negative, except as otherwise noted.      Objective    /76   Pulse 92   Temp 98.2  F (36.8  C)   Resp 22   Wt 75.8 kg (167 lb)   SpO2 98%   Breastfeeding No   BMI 31.30 kg/m    Body mass index is 31.3 kg/m .  Physical Exam     GENERAL: healthy, alert and no distress  EYES: Eyes grossly normal to inspection, PERRL and conjunctivae and sclerae normal  HENT: ear canals and TM's normal, nose and mouth without ulcers or lesions  NECK: no adenopathy, no asymmetry, masses, or scars and thyroid normal to palpation  RESP: lungs clear to auscultation - no rales, rhonchi or wheezes  CV: regular rate and rhythm, normal S1 S2, no S3 or S4, no murmur, click or rub, no peripheral edema  ABDOMEN: soft,  nontender, no hepatosplenomegaly, no masses and bowel sounds normal  NEURO: Normal strength and tone, mentation intact and speech normal  PSYCH: mentation appears normal, affect normal/bright

## 2021-06-18 DIAGNOSIS — N30.01 ACUTE CYSTITIS WITH HEMATURIA: Primary | ICD-10-CM

## 2021-06-18 LAB — B BURGDOR IGG+IGM SER QL: 0.36 (ref 0–0.89)

## 2021-06-18 RX ORDER — NITROFURANTOIN 25; 75 MG/1; MG/1
100 CAPSULE ORAL 2 TIMES DAILY
Qty: 14 CAPSULE | Refills: 0 | Status: SHIPPED | OUTPATIENT
Start: 2021-06-18 | End: 2021-06-25

## 2021-06-20 LAB
BACTERIA SPEC CULT: ABNORMAL
Lab: ABNORMAL
SPECIMEN SOURCE: ABNORMAL

## 2021-06-23 DIAGNOSIS — F41.9 ANXIETY: ICD-10-CM

## 2021-06-23 NOTE — TELEPHONE ENCOUNTER
LORazepam (ATIVAN) 0.5 MG tablet 30 tablet 0 11/10/2020  No   Sig: TAKE 1/2 TO 1 (ONE-HALF TO ONE) TABLET BY MOUTH EVERY 8 HOURS AS NEEDED FOR ANXIETY   Sent to pharmacy as: LORazepam 0.5 MG Oral Tablet (       Last office visit:6/17/2021t:          Routing refill request to provider for review/approval because:  Drug not on the [facility] refill protocol     Lianet Mora RN, BSN  [facility]

## 2021-06-24 RX ORDER — LORAZEPAM 0.5 MG/1
TABLET ORAL
Qty: 30 TABLET | Refills: 0 | Status: SHIPPED | OUTPATIENT
Start: 2021-06-24 | End: 2021-10-16

## 2021-07-05 ENCOUNTER — MYC MEDICAL ADVICE (OUTPATIENT)
Dept: FAMILY MEDICINE | Facility: CLINIC | Age: 66
End: 2021-07-05

## 2021-07-05 ENCOUNTER — MYC REFILL (OUTPATIENT)
Dept: FAMILY MEDICINE | Facility: CLINIC | Age: 66
End: 2021-07-05

## 2021-07-05 DIAGNOSIS — L28.1 PRURIGO NODULARIS: ICD-10-CM

## 2021-07-05 DIAGNOSIS — K90.0 CELIAC DISEASE: ICD-10-CM

## 2021-07-05 NOTE — TELEPHONE ENCOUNTER
My chart message sent   Lianet Mora RN, BSN  [facility]

## 2021-07-06 DIAGNOSIS — N30.01 ACUTE CYSTITIS WITH HEMATURIA: ICD-10-CM

## 2021-07-06 LAB
ALBUMIN UR-MCNC: NEGATIVE MG/DL
APPEARANCE UR: CLEAR
BILIRUB UR QL STRIP: NEGATIVE
COLOR UR AUTO: YELLOW
GLUCOSE UR STRIP-MCNC: NEGATIVE MG/DL
HGB UR QL STRIP: NEGATIVE
KETONES UR STRIP-MCNC: NEGATIVE MG/DL
LEUKOCYTE ESTERASE UR QL STRIP: NEGATIVE
NITRATE UR QL: NEGATIVE
PH UR STRIP: 7 PH (ref 5–7)
SOURCE: NORMAL
SP GR UR STRIP: 1.02 (ref 1–1.03)
UROBILINOGEN UR STRIP-ACNC: 0.2 EU/DL (ref 0.2–1)

## 2021-07-06 PROCEDURE — 81003 URINALYSIS AUTO W/O SCOPE: CPT | Performed by: NURSE PRACTITIONER

## 2021-07-06 RX ORDER — METAPROTERENOL SULFATE 10 MG
500 TABLET ORAL DAILY
Qty: 90 CAPSULE | Refills: 1 | Status: SHIPPED | OUTPATIENT
Start: 2021-07-06 | End: 2021-11-04

## 2021-07-06 RX ORDER — BETAMETHASONE DIPROPIONATE 0.5 MG/G
CREAM TOPICAL
Qty: 45 G | Refills: 1 | Status: SHIPPED | OUTPATIENT
Start: 2021-07-06 | End: 2021-07-13

## 2021-07-06 RX ORDER — ZINC OXIDE 13 %
1 CREAM (GRAM) TOPICAL DAILY
Qty: 90 CAPSULE | Refills: 3 | Status: SHIPPED | OUTPATIENT
Start: 2021-07-06 | End: 2021-11-04

## 2021-07-06 RX ORDER — LACTOBACILLUS ACIDOPHILUS
POWDER (GRAM) MISCELLANEOUS
Status: CANCELLED | OUTPATIENT
Start: 2021-07-06

## 2021-07-06 RX ORDER — CALCIUM CARBONATE 500(1250)
1 TABLET ORAL 2 TIMES DAILY
Qty: 180 TABLET | Refills: 1 | Status: SHIPPED | OUTPATIENT
Start: 2021-07-06

## 2021-07-06 NOTE — RESULT ENCOUNTER NOTE
Dear Austin,     - Urinalysis was normal and reassuring.        Please send a [employer] message or call [phone]  if you have any questions.      TANYA Tavera, CNP  [facility]

## 2021-07-06 NOTE — TELEPHONE ENCOUNTER
Ok to fill fish oil and calcium     Please review other for  dosing     Thank you     Lianet Mora RN, BSN  [facility]

## 2021-07-10 ENCOUNTER — MYC MEDICAL ADVICE (OUTPATIENT)
Dept: FAMILY MEDICINE | Facility: CLINIC | Age: 66
End: 2021-07-10

## 2021-07-10 DIAGNOSIS — L28.1 PRURIGO NODULARIS: Primary | ICD-10-CM

## 2021-07-12 NOTE — TELEPHONE ENCOUNTER
Dunia, please see message from Lianet and attachments, she is asking for lotion not cream for betamethasone dipropionate. Please review and advise.     Thank you,  Shelly Dillon R.N.

## 2021-07-13 RX ORDER — BETAMETHASONE DIPROPIONATE 0.5 MG/G
LOTION TOPICAL 2 TIMES DAILY
Qty: 120 ML | Refills: 9 | Status: SHIPPED | OUTPATIENT
Start: 2021-07-13 | End: 2021-11-04

## 2021-07-20 ENCOUNTER — TRANSFERRED RECORDS (OUTPATIENT)
Dept: HEALTH INFORMATION MANAGEMENT | Facility: CLINIC | Age: 66
End: 2021-07-20

## 2021-08-11 ENCOUNTER — MYC MEDICAL ADVICE (OUTPATIENT)
Dept: FAMILY MEDICINE | Facility: CLINIC | Age: 66
End: 2021-08-11

## 2021-08-11 DIAGNOSIS — R21 RASH AND NONSPECIFIC SKIN ERUPTION: Primary | ICD-10-CM

## 2021-08-12 ENCOUNTER — MYC MEDICAL ADVICE (OUTPATIENT)
Dept: FAMILY MEDICINE | Facility: CLINIC | Age: 66
End: 2021-08-12

## 2021-08-13 NOTE — TELEPHONE ENCOUNTER
See other mychart encounter.    Jerman MON RN   [facility] Triage

## 2021-08-13 NOTE — TELEPHONE ENCOUNTER
From other mychart encounter:  It's on my arms , back ,my head. The liquid stuff you gave me for my head is working ok but the cream for the body isn't doing that well.    Routing to PCP to review and advise, or sign if agree with Derm referral.    Jerman MON RN   [facility]

## 2021-08-13 NOTE — TELEPHONE ENCOUNTER
Comment: Please be aware that coverage of these services is subject to the terms and limitations of your health insurance plan.  Call member services at your health plan with any benefit or coverage questions.   [facility] will call you to coordinate your care as prescribed by your provider. If you don't hear from a representative within 2 business days, please call [phone].     Derm referral signed.     Be seen in clinic if worsening prior to being seen by dermatology.         Ryann Almanzar, LANREP-BC

## 2021-10-05 ENCOUNTER — OFFICE VISIT (OUTPATIENT)
Dept: DERMATOLOGY | Facility: CLINIC | Age: 66
End: 2021-10-05
Attending: NURSE PRACTITIONER
Payer: COMMERCIAL

## 2021-10-05 VITALS — HEART RATE: 74 BPM | OXYGEN SATURATION: 99 % | SYSTOLIC BLOOD PRESSURE: 120 MMHG | DIASTOLIC BLOOD PRESSURE: 82 MMHG

## 2021-10-05 DIAGNOSIS — L30.9 DERMATITIS: Primary | ICD-10-CM

## 2021-10-05 DIAGNOSIS — D48.5 NEOPLASM OF UNCERTAIN BEHAVIOR OF SKIN: ICD-10-CM

## 2021-10-05 PROCEDURE — 88312 SPECIAL STAINS GROUP 1: CPT | Performed by: DERMATOLOGY

## 2021-10-05 PROCEDURE — 99214 OFFICE O/P EST MOD 30 MIN: CPT | Mod: 25 | Performed by: PHYSICIAN ASSISTANT

## 2021-10-05 PROCEDURE — 88305 TISSUE EXAM BY PATHOLOGIST: CPT | Performed by: DERMATOLOGY

## 2021-10-05 PROCEDURE — 88346 IMFLUOR 1ST 1ANTB STAIN PX: CPT | Performed by: DERMATOLOGY

## 2021-10-05 PROCEDURE — 88350 IMFLUOR EA ADDL 1ANTB STN PX: CPT | Performed by: DERMATOLOGY

## 2021-10-05 PROCEDURE — 11102 TANGNTL BX SKIN SINGLE LES: CPT | Performed by: PHYSICIAN ASSISTANT

## 2021-10-05 RX ORDER — CLOBETASOL PROPIONATE 0.5 MG/G
OINTMENT TOPICAL
Qty: 60 G | Refills: 3 | Status: SHIPPED | OUTPATIENT
Start: 2021-10-05 | End: 2023-11-07

## 2021-10-05 NOTE — LETTER
10/5/2021         RE: Austin Shaffer  [address] [address]        Dear Colleague,    Thank you for referring your patient, Austin Shaffer, to the [facility]. Please see a copy of my visit note below.    HPI:   Chief complaints: Austin Shaffer is a pleasant 65 year old female who presents for evaluation of a recurrent rash on the body. She will get itchy patches on the body on and off. She has breakouts 4-5 times per year.she feels when the spots start they are little blisters. They are very itchy and she will scratch. She tried betamethasone cream but this did not help. Antihistamines also do not help.       PHYSICAL EXAM:    /82   Pulse 74   SpO2 99%   Skin exam performed as follows: Type 2 skin. Mood appropriate  Alert and Oriented X 3. Well developed, well nourished in no distress.  General appearance: Normal  Head including face: Normal  Eyes: conjunctiva and lids: Normal  Mouth: Lips, teeth, gums: Normal  Neck: Normal  Cardiovascular: Exam of peripheral vascular system by observation for swelling, varicosities, edema: Normal  Right upper extremity: Normal  Left upper extremity: Normal  Right lower extremity: Normal  Left lower extremity: Normal  Skin: Scalp and body hair: See below    Lichenification and dermatitis on the right upper back, left superior shoulder; few spots on the arms    ASSESSMENT/PLAN:     1. Favor spong derm; discussed biopsy to r/o DH given her history of celiac; she prefers to biopsy today. Spong derm r/o dermatitis herpetiformis on the left superior shoulder. Shave biopsy performed for H&E and DIF.  Area cleaned and anesthetized with 1% lidocaine with epinephrine.  Dermablade used to remove the lesion and sent to pathology. Bleeding was cauterized. Pt tolerated procedure well with no complications.   --Start clobetasol ointmemnt BID x 1-2 weeks then PRN  --Thick emollients every day-BID            Follow-up:  pending path  CC:   Scribed By: Sally Patrick, MS, PASILVIA          Again, thank you for allowing me to participate in the care of your patient.        Sincerely,        Slaly Patrick PA-C

## 2021-10-05 NOTE — PROGRESS NOTES
HPI:   Chief complaints: Austin Shaffer is a pleasant 65 year old female who presents for evaluation of a recurrent rash on the body. She will get itchy patches on the body on and off. She has breakouts 4-5 times per year.she feels when the spots start they are little blisters. They are very itchy and she will scratch. She tried betamethasone cream but this did not help. Antihistamines also do not help.       PHYSICAL EXAM:    /82   Pulse 74   SpO2 99%   Skin exam performed as follows: Type 2 skin. Mood appropriate  Alert and Oriented X 3. Well developed, well nourished in no distress.  General appearance: Normal  Head including face: Normal  Eyes: conjunctiva and lids: Normal  Mouth: Lips, teeth, gums: Normal  Neck: Normal  Cardiovascular: Exam of peripheral vascular system by observation for swelling, varicosities, edema: Normal  Right upper extremity: Normal  Left upper extremity: Normal  Right lower extremity: Normal  Left lower extremity: Normal  Skin: Scalp and body hair: See below    Lichenification and dermatitis on the right upper back, left superior shoulder; few spots on the arms    ASSESSMENT/PLAN:     1. Favor spong derm; discussed biopsy to r/o DH given her history of celiac; she prefers to biopsy today. Spong derm r/o dermatitis herpetiformis on the left superior shoulder. Shave biopsy performed for H&E and DIF.  Area cleaned and anesthetized with 1% lidocaine with epinephrine.  Dermablade used to remove the lesion and sent to pathology. Bleeding was cauterized. Pt tolerated procedure well with no complications.   --Start clobetasol ointmemnt BID x 1-2 weeks then PRN  --Thick emollients every day-BID            Follow-up: pending path  CC:   Scribed By: Sally Patrick, MS, PA-C

## 2021-10-12 ENCOUNTER — TELEPHONE (OUTPATIENT)
Dept: DERMATOLOGY | Facility: CLINIC | Age: 66
End: 2021-10-12

## 2021-10-12 LAB
PATH REPORT.COMMENTS IMP SPEC: NORMAL
PATH REPORT.FINAL DX SPEC: NORMAL
PATH REPORT.GROSS SPEC: NORMAL
PATH REPORT.MICROSCOPIC SPEC OTHER STN: NORMAL
PATH REPORT.RELEVANT HX SPEC: NORMAL

## 2021-10-12 NOTE — TELEPHONE ENCOUNTER
Last Read in [employer]   10/12/2021  3:45 PM by Austin Shaffer  [employer] message sent:    Emile Avila-    Pathology confirmed eczema.     For this we recommend:   -gentle soaps   -thick moisturizers every day   -clobetasol as needed     Please let us know if you are not improving.    AMINTA Eubanks-BSN-PHN  [facility]  [phone]

## 2021-10-12 NOTE — TELEPHONE ENCOUNTER
----- Message from Sally Patrick PA-C sent at 10/12/2021  2:34 PM CDT -----  Pathology confirmed eczema. For this we recommend gentle soaps, thick moisturizers every day and clobetasol as needed. Let me know if she is not improving.

## 2021-10-16 ENCOUNTER — MYC REFILL (OUTPATIENT)
Dept: FAMILY MEDICINE | Facility: CLINIC | Age: 66
End: 2021-10-16
Payer: COMMERCIAL

## 2021-10-16 DIAGNOSIS — F41.9 ANXIETY: ICD-10-CM

## 2021-10-16 NOTE — LETTER
[address] [facility] [address] [phone]       November 8, 2021    Austin DOUG Shaffer  [address] [address] [address]        To Austin:    We have been calling you regarding a recent refill request we received for LORazepam (ATIVAN) 0.5 MG tablet. Unfortunately, we were unable to reach you. We are notifying you that you are due for fasting physical prior to your next refill.  You can schedule this appointment via [employer] or by calling the clinic at [phone] Option 1.          Sincerely,    Dunia Desai, APRN, CNP / jmp

## 2021-10-20 ENCOUNTER — MYC REFILL (OUTPATIENT)
Dept: FAMILY MEDICINE | Facility: CLINIC | Age: 66
End: 2021-10-20

## 2021-10-20 DIAGNOSIS — F41.9 ANXIETY: ICD-10-CM

## 2021-10-21 ENCOUNTER — MYC MEDICAL ADVICE (OUTPATIENT)
Dept: FAMILY MEDICINE | Facility: CLINIC | Age: 66
End: 2021-10-21

## 2021-10-21 ENCOUNTER — TELEPHONE (OUTPATIENT)
Dept: FAMILY MEDICINE | Facility: CLINIC | Age: 66
End: 2021-10-21

## 2021-10-21 RX ORDER — LORAZEPAM 0.5 MG/1
TABLET ORAL
Qty: 30 TABLET | Refills: 0 | OUTPATIENT
Start: 2021-10-21

## 2021-10-21 RX ORDER — LORAZEPAM 0.5 MG/1
TABLET ORAL
Qty: 30 TABLET | Refills: 0 | Status: SHIPPED | OUTPATIENT
Start: 2021-10-21 | End: 2021-11-26

## 2021-10-21 NOTE — TELEPHONE ENCOUNTER
Prior Authorization Retail Medication Request    Medication/Dose: Lorazepam 0.5 mg tablets  ICD code (if different than what is on RX):    Previously Tried and Failed:    Rationale:      Insurance Name:  In chart  Insurance ID:        Pharmacy Information (if different than what is on RX)  Name:  [facility]  Phone:  [phone]  Cover My Meds Key:  [id number]   Plan does not  Cover, no alternatives listed.  Please change RX or advise to start DEJA Arreguin

## 2021-10-21 NOTE — TELEPHONE ENCOUNTER
Reason for Call:  Form, our goal is to have forms completed with 72 hours, however, some forms may require a visit or additional information.    Type of letter, form or note:  medical    Who is the form from?: CoverMyMeds (if other please explain)    Where did the form come from: form was faxed in    What clinic location was the form placed at?: [facility]    Where the form was placed: Dunia Desai Box/Folder    What number is listed as a contact on the form?: Fax to [phone]       Additional comments: Lorazepam 0.5 mg tablets    Call taken on 10/21/2021 at 1:58 PM by Swathi Valenzuela

## 2021-10-22 NOTE — TELEPHONE ENCOUNTER
My chart message sent     Lianet Mora RN, BSN  [facility]

## 2021-10-24 ENCOUNTER — HEALTH MAINTENANCE LETTER (OUTPATIENT)
Age: 66
End: 2021-10-24

## 2021-10-25 NOTE — TELEPHONE ENCOUNTER
Central Prior Authorization Team   Phone: [phone]    PA Initiation    Medication: Lorazepam 0.5 mg tablets  Insurance Company: [facility] - Phone [phone] Fax [phone]  Pharmacy Filling the Rx: [facility] [address] [place] [address] - [address]  Filling Pharmacy Phone: [phone]  Filling Pharmacy Fax:    Start Date: 10/25/2021

## 2021-10-25 NOTE — TELEPHONE ENCOUNTER
Prior Authorization Approval    Authorization Effective Date: 7/27/2021  Authorization Expiration Date: 10/25/2022  Medication: Lorazepam 0.5 mg tablets  Approved Dose/Quantity:    Reference #:     Insurance Company: [employer] [place] - Phone [phone] Fax [phone]  Expected CoPay:       CoPay Card Available:      Foundation Assistance Needed:    Which Pharmacy is filling the prescription (Not needed for infusion/clinic administered): [facility] [address] [place], [place] [address] - [address]  Pharmacy Notified: Yes  Patient Notified: Yes  **Instructed pharmacy to notify patient when script is ready to /ship.**

## 2021-11-02 ASSESSMENT — ACTIVITIES OF DAILY LIVING (ADL): CURRENT_FUNCTION: NO ASSISTANCE NEEDED

## 2021-11-02 ASSESSMENT — ENCOUNTER SYMPTOMS
EYE PAIN: 0
HEADACHES: 0
ARTHRALGIAS: 1
FEVER: 0
BREAST MASS: 0
DIARRHEA: 0
HEARTBURN: 0
PALPITATIONS: 0
ABDOMINAL PAIN: 0
NERVOUS/ANXIOUS: 1
JOINT SWELLING: 0
CONSTIPATION: 0
COUGH: 0
MYALGIAS: 0
NAUSEA: 0
HEMATOCHEZIA: 0
DYSURIA: 0
FREQUENCY: 0
SORE THROAT: 0
SHORTNESS OF BREATH: 0
WEAKNESS: 0
DIZZINESS: 0
HEMATURIA: 0
CHILLS: 0
PARESTHESIAS: 0

## 2021-11-04 ENCOUNTER — OFFICE VISIT (OUTPATIENT)
Dept: FAMILY MEDICINE | Facility: CLINIC | Age: 66
End: 2021-11-04
Payer: COMMERCIAL

## 2021-11-04 VITALS
TEMPERATURE: 96.1 F | RESPIRATION RATE: 16 BRPM | WEIGHT: 173 LBS | OXYGEN SATURATION: 96 % | HEART RATE: 78 BPM | HEIGHT: 61 IN | SYSTOLIC BLOOD PRESSURE: 104 MMHG | BODY MASS INDEX: 32.66 KG/M2 | DIASTOLIC BLOOD PRESSURE: 80 MMHG

## 2021-11-04 DIAGNOSIS — Z13.220 SCREENING FOR LIPID DISORDERS: ICD-10-CM

## 2021-11-04 DIAGNOSIS — Z23 HIGH PRIORITY FOR 2019-NCOV VACCINE: ICD-10-CM

## 2021-11-04 DIAGNOSIS — F41.9 ANXIETY: ICD-10-CM

## 2021-11-04 DIAGNOSIS — Z12.31 ENCOUNTER FOR SCREENING MAMMOGRAM FOR BREAST CANCER: ICD-10-CM

## 2021-11-04 DIAGNOSIS — Z13.1 SCREENING FOR DIABETES MELLITUS: ICD-10-CM

## 2021-11-04 DIAGNOSIS — E55.9 VITAMIN D DEFICIENCY: ICD-10-CM

## 2021-11-04 DIAGNOSIS — K26.9 DUODENAL ULCER WITHOUT HEMORRHAGE OR PERFORATION AND WITHOUT OBSTRUCTION: ICD-10-CM

## 2021-11-04 DIAGNOSIS — Z00.00 ENCOUNTER FOR MEDICARE ANNUAL WELLNESS EXAM: Primary | ICD-10-CM

## 2021-11-04 DIAGNOSIS — K90.0 CELIAC DISEASE: ICD-10-CM

## 2021-11-04 PROCEDURE — 91306 COVID-19,PF,MODERNA (18+ YRS BOOSTER .25ML): CPT | Performed by: NURSE PRACTITIONER

## 2021-11-04 PROCEDURE — 80061 LIPID PANEL: CPT | Performed by: NURSE PRACTITIONER

## 2021-11-04 PROCEDURE — 0064A PR ADMIN COVID VAC MODERNA, 0.25ML BOOSTER: CPT | Performed by: NURSE PRACTITIONER

## 2021-11-04 PROCEDURE — 99213 OFFICE O/P EST LOW 20 MIN: CPT | Mod: 25 | Performed by: NURSE PRACTITIONER

## 2021-11-04 PROCEDURE — 99397 PER PM REEVAL EST PAT 65+ YR: CPT | Mod: 25 | Performed by: NURSE PRACTITIONER

## 2021-11-04 PROCEDURE — 96127 BRIEF EMOTIONAL/BEHAV ASSMT: CPT | Performed by: NURSE PRACTITIONER

## 2021-11-04 PROCEDURE — 80048 BASIC METABOLIC PNL TOTAL CA: CPT | Performed by: NURSE PRACTITIONER

## 2021-11-04 PROCEDURE — 36415 COLL VENOUS BLD VENIPUNCTURE: CPT | Performed by: NURSE PRACTITIONER

## 2021-11-04 RX ORDER — VITAMIN B COMPLEX
1 TABLET ORAL DAILY
Qty: 90 TABLET | Refills: 3 | Status: SHIPPED | OUTPATIENT
Start: 2021-11-04 | End: 2022-11-08

## 2021-11-04 RX ORDER — METAPROTERENOL SULFATE 10 MG
500 TABLET ORAL 2 TIMES DAILY
Qty: 180 CAPSULE | Refills: 3 | Status: SHIPPED | OUTPATIENT
Start: 2021-11-04

## 2021-11-04 RX ORDER — ZINC OXIDE 13 %
1 CREAM (GRAM) TOPICAL DAILY
Qty: 90 CAPSULE | Refills: 3 | Status: SHIPPED | OUTPATIENT
Start: 2021-11-04

## 2021-11-04 RX ORDER — VENLAFAXINE HYDROCHLORIDE 150 MG/1
150 CAPSULE, EXTENDED RELEASE ORAL DAILY
Qty: 90 CAPSULE | Refills: 3 | Status: SHIPPED | OUTPATIENT
Start: 2021-11-04 | End: 2022-10-13

## 2021-11-04 ASSESSMENT — ENCOUNTER SYMPTOMS
DYSURIA: 0
HEMATURIA: 0
NERVOUS/ANXIOUS: 1
DIARRHEA: 0
PALPITATIONS: 0
SORE THROAT: 0
ABDOMINAL PAIN: 0
EYE PAIN: 0
HEARTBURN: 0
HEMATOCHEZIA: 0
NAUSEA: 0
BREAST MASS: 0
COUGH: 0
SHORTNESS OF BREATH: 0
JOINT SWELLING: 0
PARESTHESIAS: 0
HEADACHES: 0
CHILLS: 0
FEVER: 0
FREQUENCY: 0
WEAKNESS: 0
ARTHRALGIAS: 1
MYALGIAS: 0
CONSTIPATION: 0
DIZZINESS: 0

## 2021-11-04 ASSESSMENT — PATIENT HEALTH QUESTIONNAIRE - PHQ9
SUM OF ALL RESPONSES TO PHQ QUESTIONS 1-9: 10
5. POOR APPETITE OR OVEREATING: SEVERAL DAYS

## 2021-11-04 ASSESSMENT — ANXIETY QUESTIONNAIRES
5. BEING SO RESTLESS THAT IT IS HARD TO SIT STILL: NOT AT ALL
GAD7 TOTAL SCORE: 5
2. NOT BEING ABLE TO STOP OR CONTROL WORRYING: NOT AT ALL
7. FEELING AFRAID AS IF SOMETHING AWFUL MIGHT HAPPEN: SEVERAL DAYS
3. WORRYING TOO MUCH ABOUT DIFFERENT THINGS: SEVERAL DAYS
1. FEELING NERVOUS, ANXIOUS, OR ON EDGE: SEVERAL DAYS
IF YOU CHECKED OFF ANY PROBLEMS ON THIS QUESTIONNAIRE, HOW DIFFICULT HAVE THESE PROBLEMS MADE IT FOR YOU TO DO YOUR WORK, TAKE CARE OF THINGS AT HOME, OR GET ALONG WITH OTHER PEOPLE: NOT DIFFICULT AT ALL
6. BECOMING EASILY ANNOYED OR IRRITABLE: SEVERAL DAYS

## 2021-11-04 ASSESSMENT — ACTIVITIES OF DAILY LIVING (ADL): CURRENT_FUNCTION: NO ASSISTANCE NEEDED

## 2021-11-04 ASSESSMENT — MIFFLIN-ST. JEOR: SCORE: 1256.06

## 2021-11-04 NOTE — PATIENT INSTRUCTIONS
Check with insurance regarding coverage of shingles vaccine (Shingrix) in clinic or pharmacy.        Patient Education   Personalized Prevention Plan  You are due for the preventive services outlined below.  Your care team is available to assist you in scheduling these services.  If you have already completed any of these items, please share that information with your care team to update in your medical record.  Health Maintenance Due   Topic Date Due     Zoster (Shingles) Vaccine (1 of 2) Never done     Pneumococcal Vaccine (2 of 2 - PPSV23) 11/04/2018     Asthma Action Plan - yearly  10/03/2020     Flu Vaccine (1) 09/01/2021     Asthma Control Test  09/26/2021     Depression Assessment  09/26/2021     Mammogram  10/03/2021       Exercise for a Healthier Heart  You may wonder how you can improve the health of your heart. If you re thinking about exercise, you re on the right track. You don t need to become an athlete. But you do need a certain amount of brisk exercise to help strengthen your heart. If you have been diagnosed with a heart condition, your healthcare provider may advise exercise to help stabilize your condition. To help make exercise a habit, choose safe, fun activities.      Exercise with a friend. When activity is fun, you're more likely to stick with it.   Before you start  Check with your healthcare provider before starting an exercise program. This is especially important if you have not been active for a while. It's also important if you have a long-term (chronic) health problem such as heart disease, diabetes, or obesity. Or if you are at high risk for having these problems.   Why exercise?  Exercising regularly offers many healthy rewards. It can help you do all of the following:     Improve your blood cholesterol level to help prevent further heart trouble    Lower your blood pressure to help prevent a stroke or heart attack    Control diabetes, or reduce your risk of getting this  disease    Improve your heart and lung function    Reach and stay at a healthy weight    Make your muscles stronger so you can stay active    Prevent falls and fractures by slowing the loss of bone mass (osteoporosis)    Manage stress better    Reduce your blood pressure    Improve your sense of self and your body image  Exercise tips      Ease into your routine. Set small goals. Then build on them. If you are not sure what your activity level should be, talk with your healthcare provider first before starting an exercise routine.    Exercise on most days. Aim for a total of 150 minutes (2 hours and 30 minutes) or more of moderate-intensity aerobic activity each week. Or 75 minutes (1 hour and 15 minutes) or more of vigorous-intensity aerobic activity each week. Or try for a combination of both. Moderate activity means that you breathe heavier and your heart rate increases but you can still talk. Think about doing 40 minutes of moderate exercise, 3 to 4 times a week. For best results, activity should last for about 40 minutes to lower blood pressure and cholesterol. It's OK to work up to the 40-minute period over time. Examples of moderate-intensity activity are walking 1 mile in 15 minutes. Or doing 30 to 45 minutes of yard work.    Step up your daily activity level.  Along with your exercise program, try being more active the whole day. Walk instead of drive. Or park further away so that you take more steps each day. Do more household tasks or yard work. You may not be able to meet the advised mount of physical activity. But doing some moderate- or vigorous-intensity aerobic activity can help reduce your risk for heart disease. Your healthcare provider can help you figure out what is best for you.    Choose 1 or more activities you enjoy.  Walking is one of the easiest things you can do. You can also try swimming, riding a bike, dancing, or taking an exercise class.    When to call your healthcare provider  Call  your healthcare provider if you have any of these:     Chest pain or feel dizzy or lightheaded    Burning, tightness, pressure, or heaviness in your chest, neck, shoulders, back, or arms    Abnormal shortness of breath    More joint or muscle pain    A very fast or irregular heartbeat (palpitations)  StayWell last reviewed this educational content on 7/1/2019 2000-2021 [employer]. All rights reserved. This information is not intended as a substitute for professional medical care. Always follow your healthcare professional's instructions.          Signs of Hearing Loss      Hearing much better with one ear can be a sign of hearing loss.   Hearing loss is a problem shared by many people. In fact, it is one of the most common health problems, particularly as people age. Most people age 65 and older have some hearing loss. By age 80, almost everyone does. Hearing loss often occurs slowly over the years. So you may not realize your hearing has gotten worse.  Have your hearing checked  Call your healthcare provider if you:    Have to strain to hear normal conversation    Have to watch other people s faces very carefully to follow what they re saying    Need to ask people to repeat what they ve said    Often misunderstand what people are saying    Turn the volume of the television or radio up so high that others complain    Feel that people are mumbling when they re talking to you    Find that the effort to hear leaves you feeling tired and irritated    Notice, when using the phone, that you hear better with one ear than the other  StayWell last reviewed this educational content on 1/1/2020 2000-2021 [employer]. All rights reserved. This information is not intended as a substitute for professional medical care. Always follow your healthcare professional's instructions.        Your Health Risk Assessment indicates you feel you are not in good emotional health.    Recreation   Recreation  is not limited to sports and team events. It includes any activity that provides relaxation, interest, enjoyment, and exercise. Recreation provides an outlet for physical, mental, and social energy. It can give a sense of worth and achievement. It can help you stay healthy.    Mental Exercise and Social Involvement  Mental and emotional health is as important as physical health. Keep in touch with friends and family. Stay as active as possible. Continue to learn and challenge yourself.   Things you can do to stay mentally active are:    Learn something new, like a foreign language or musical instrument.     Play SCRABBLE or do crossword puzzles. If you cannot find people to play these games with you at home, you can play them with others on your computer through the Internet.     Join a games club--anything from card games to chess or checkers or lawn bowling.     Start a new hobby.     Go back to school.     Volunteer.     Read.   Keep up with world events.

## 2021-11-04 NOTE — PROGRESS NOTES
"SUBJECTIVE:   Austin Shaffer is a 66 year old female who presents for Preventive Visit.      Patient has been advised of split billing requirements and indicates understanding: Yes   Are you in the first 12 months of your Medicare coverage?  No    Healthy Habits:     In general, how would you rate your overall health?  Good    Frequency of exercise:  1 day/week    Duration of exercise:  15-30 minutes    Do you usually eat at least 4 servings of fruit and vegetables a day, include whole grains    & fiber and avoid regularly eating high fat or \"junk\" foods?  Yes    Taking medications regularly:  Yes    Medication side effects:  None    Ability to successfully perform activities of daily living:  No assistance needed    Home Safety:  No safety concerns identified    Hearing Impairment:  Difficulty following a conversation in a noisy restaurant or crowded room and need to ask people to speak up or repeat themselves    In the past 6 months, have you been bothered by leaking of urine?  No    In general, how would you rate your overall mental or emotional health?  Fair      PHQ-2 Total Score: 2    Additional concerns today:  No    Anxiety:    PHQ 10/17/2020 3/26/2021 11/4/2021   PHQ-9 Total Score 8 5 10   Q9: Thoughts of better off dead/self-harm past 2 weeks Not at all Not at all Not at all     ELEAZAR-7 SCORE 10/17/2020 3/26/2021 11/4/2021   Total Score - - -   Total Score 5 (mild anxiety) - -   Total Score 5 3 5     Anxiety feels stable on Effexor XR, 150 mg daily - improved after dose increase.   Uses Lorazepam typically when she leaves the home and is around a larger number of people.    Stays home most of the time.     Is planning on traveling at the end of November.  Is anxious about travel and Celiac/gluten dietary options.    Is nervous about not knowing where they are traveling to.       Difficulty with insomnia. Prescribed hydroxyzine and hasn't tried yet.       Celiac Disease:    Diagnosed with Celiac Disease 2 " years ago- through  [facility].   Has adjusted to dietary changes.    No more diarrhea and feels pretty good. Takes Omeprazole BID.   Last colonoscopy was 2019.        Recent Labs   Lab Test 10/28/20  1051 10/03/19  1059 09/15/16  0905 06/19/15  0937 05/05/14  0839 05/05/14  0839   CHOL 251* 246*   < > 252*   < > 256*   HDL 53 55   < > 71   < > 59   * 176*   < > 163*   < > 180*   TRIG 134 77   < > 88   < > 88   CHOLHDLRATIO  --   --   --  3.5  --  4.4    < > = values in this interval not displayed.     The 10-year ASCVD risk score (Daily YEUNG Jr., et al., 2013) is: 5.7%    Values used to calculate the score:      Age: 66 years      Sex: Female      Is Non- : No      Diabetic: No      Tobacco smoker: No      Systolic Blood Pressure: 104 mmHg      Is BP treated: No      HDL Cholesterol: 53 mg/dL      Total Cholesterol: 251 mg/dL    Asthma:  She presents for follow up of asthma.  She has no cough, no wheezing, and no shortness of breath. She is using a relief medication a few times a month. She does not have a controller medication. Patient is aware of the following triggers: emotions, exercise or sports, humidity and mold. The patient has not had a visit to the Emergency Room, Urgent Care or Hospital due to asthma since the last clinic visit.        ACT Total Scores 7/2/2019 3/26/2021 11/4/2021   ACT TOTAL SCORE - - -   ASTHMA ER VISITS - - -   ASTHMA HOSPITALIZATIONS - - -   ACT TOTAL SCORE (Goal Greater than or Equal to 20) 24 25 21   In the past 12 months, how many times did you visit the emergency room for your asthma without being admitted to the hospital? 0 0 0   In the past 12 months, how many times were you hospitalized overnight because of your asthma? 0 0 0       Do you feel safe in your environment? Yes    Have you ever done Advance Care Planning? (For example, a Health Directive, POLST, or a discussion with a medical provider or your loved ones about your  wishes): Yes, patient states has an Advance Care Planning document and will bring a copy to the clinic.    Fall risk       Cognitive Screening   1) Repeat 3 items (Leader, Season, Table)    2) Clock draw: NORMAL  3) 3 item recall: Recalls 3 objects  Results: 3 items recalled: COGNITIVE IMPAIRMENT LESS LIKELY    Mini-CogTM Copyright BRAULIO Anaya. Licensed by the author for use in [facility]; reprinted with permission (deb@[facility]). All rights reserved.      Do you have sleep apnea, excessive snoring or daytime drowsiness?: no    Reviewed and updated as needed this visit by clinical staff  Tobacco  Allergies  Meds  Problems  Med Hx  Surg Hx  Fam Hx  Soc Hx          Reviewed and updated as needed this visit by Provider    Meds             Social History     Tobacco Use     Smoking status: Former Smoker     Packs/day: 1.00     Years: 5.00     Pack years: 5.00     Types: Cigarettes     Start date: 1969     Quit date: 3/20/1974     Years since quittin.6     Smokeless tobacco: Never Used   Substance Use Topics     Alcohol use: Yes     Alcohol/week: 0.0 standard drinks     Comment: About 3-4 a mounth     If you drink alcohol do you typically have >3 drinks per day or >7 drinks per week? No    Alcohol Use 2021   Prescreen: >3 drinks/day or >7 drinks/week? -   Prescreen: >3 drinks/day or >7 drinks/week? No           Anxiety Follow-Up    How are you doing with your anxiety since your last visit? Better - once in awhile things get crazy    Are you having other symptoms that might be associated with anxiety? No    Have you had a significant life event? No     Are you feeling depressed? No    Do you have any concerns with your use of alcohol or other drugs? No    Social History     Tobacco Use     Smoking status: Former Smoker     Packs/day: 1.00     Years: 5.00     Pack years: 5.00     Types: Cigarettes     Start date: 1969     Quit date: 3/20/1974     Years since quittin.6     Smokeless  tobacco: Never Used   Substance Use Topics     Alcohol use: Yes     Alcohol/week: 0.0 standard drinks     Comment: About 3-4 a mounth     Drug use: No     ELEAZAR-7 SCORE 10/17/2020 3/26/2021 11/4/2021   Total Score - - -   Total Score 5 (mild anxiety) - -   Total Score 5 3 5     PHQ 10/17/2020 3/26/2021 11/4/2021   PHQ-9 Total Score 8 5 10   Q9: Thoughts of better off dead/self-harm past 2 weeks Not at all Not at all Not at all         Current providers sharing in care for this patient include:    Patient Care Team:  Dunia Desai APRN CNP as PCP - General (Nurse Practitioner - Family)  Fahad Longoria MD as Assigned Musculoskeletal Provider  Dunia Desai APRN CNP as Assigned PCP  Sally Patrick PA-C as Physician Assistant (Dermatology)  Ryann Almanzar APRN CNP as Referring Physician (Nurse Practitioner - Family)  Sally Patrick PA-C as Assigned Surgical Provider    The following health maintenance items are reviewed in Epic and correct as of today:  Health Maintenance Due   Topic Date Due     ZOSTER IMMUNIZATION (1 of 2) Never done     Pneumococcal Vaccine: 65+ Years (2 of 2 - PPSV23) 11/04/2018     ASTHMA ACTION PLAN  10/03/2020     INFLUENZA VACCINE (1) 09/01/2021     ASTHMA CONTROL TEST  09/26/2021     MAMMO SCREENING  10/03/2021     BP Readings from Last 3 Encounters:   11/04/21 104/80   10/05/21 120/82   06/17/21 128/76    Wt Readings from Last 3 Encounters:   11/04/21 78.5 kg (173 lb)   06/17/21 75.8 kg (167 lb)   03/26/21 75.8 kg (167 lb)                  Patient Active Problem List   Diagnosis     Moderate recurrent major depression (H)     Intermittent asthma     CARDIOVASCULAR SCREENING; LDL GOAL LESS THAN 160     Advanced directives, counseling/discussion     Anxiety     Vitamin D deficiency     Overflow diarrhea     Celiac disease     Age-related osteoporosis without current pathological fracture     Past Surgical History:   Procedure Laterality Date     ABDOMEN SURGERY      ?  Enteritis as a child, possibly appendix     APPENDECTOMY       BIOPSY  10/05/21     COLONOSCOPY      Cant remember the date     GENITOURINARY SURGERY      Cant remember the date     HYSTERECTOMY TOTAL ABDOMINAL, BILATERAL SALPINGO-OOPHORECTOMY, COMBINED       OPEN REDUCTION INTERNAL FIXATION WRIST Left 2021    Left distal radius ORIF with Synthes volar plate (narrow plate with 3 proximal screw hole plate), Dr. Fahad Longoria, [facility]     TONSILLECTOMY         Social History     Tobacco Use     Smoking status: Former Smoker     Packs/day: 1.00     Years: 5.00     Pack years: 5.00     Types: Cigarettes     Start date: 1969     Quit date: 3/20/1974     Years since quittin.6     Smokeless tobacco: Never Used   Substance Use Topics     Alcohol use: Yes     Alcohol/week: 0.0 standard drinks     Comment: About 3-4 a [facility]     Family History   Problem Relation Age of Onset     Cardiovascular Mother      Hypertension Mother      Depression Mother      Cardiovascular Maternal Grandmother      Cancer Father         Kidney     Other Cancer Father      Myocardial Infarction Brother 60        2017     Cerebrovascular Disease Brother 63        2017     Mental Illness Brother      Diabetes Brother      Myocardial Infarction Brother      Hypertension Brother         Had bad stoke      Cerebrovascular Disease Brother      Asthma Brother      Diabetes Brother         Passed away at 60     Hypertension Brother      Other Cancer Maternal Grandfather          Current Outpatient Medications   Medication Sig Dispense Refill     calcium carbonate (OS-ZACH) 500 MG tablet Take 1 tablet (500 mg) by mouth 2 times daily 180 tablet 1     Omega-3 Fish Oil 500 MG capsule Take 1 capsule (500 mg) by mouth 2 times daily 180 capsule 3     omeprazole (PRILOSEC) 20 MG DR capsule Take 1 capsule (20 mg) by mouth 2 times daily 180 capsule 3     Probiotic Product (PROBIOTIC DAILY) CAPS Take 1 capsule by mouth daily 90 capsule 3      venlafaxine (EFFEXOR-XR) 150 MG 24 hr capsule Take 1 capsule (150 mg) by mouth daily 90 capsule 3     Vitamin D3 (CHOLECALCIFEROL) 25 mcg (1000 units) tablet Take 1 tablet (25 mcg) by mouth daily 90 tablet 3     albuterol (PROAIR HFA) 108 (90 Base) MCG/ACT inhaler Inhale 2 puffs into the lungs every 6 hours as needed for shortness of breath / dyspnea 2 Inhaler 1     clobetasol (TEMOVATE) 0.05 % external ointment Apply to AA BID x 1-2 weeks then PRN only 60 g 3     hydrOXYzine (ATARAX) 10 MG tablet Take 1-3 tablets (10-30 mg) by mouth nightly as needed for anxiety (insomnia) 45 tablet 1     LORazepam (ATIVAN) 0.5 MG tablet TAKE 1/2 (ONE-HALF) TO 1 TABLET BY MOUTH EVERY 8 HOURS AS NEEDED FOR ANXIETY 30 tablet 0         Breast CA Risk Assessment (FHS-7) 11/2/2021   Do you have a family history of breast, colon, or ovarian cancer? No / Unknown       Mammogram Screening: Recommended mammography every 1-2 years with patient discussion and risk factor consideration  Pertinent mammograms are reviewed under the imaging tab.    Review of Systems   Constitutional: Negative for chills and fever.   HENT: Positive for hearing loss. Negative for congestion, ear pain and sore throat.    Eyes: Positive for visual disturbance. Negative for pain.   Respiratory: Negative for cough and shortness of breath.    Cardiovascular: Negative for chest pain, palpitations and peripheral edema.   Gastrointestinal: Negative for abdominal pain, constipation, diarrhea, heartburn, hematochezia and nausea.   Breasts:  Negative for tenderness, breast mass and discharge.   Genitourinary: Negative for dysuria, frequency, genital sores, hematuria, pelvic pain, urgency, vaginal bleeding and vaginal discharge.   Musculoskeletal: Positive for arthralgias. Negative for joint swelling and myalgias.   Skin: Positive for rash.   Neurological: Negative for dizziness, weakness, headaches and paresthesias.   Psychiatric/Behavioral: Negative for mood changes. The  "patient is nervous/anxious.        OBJECTIVE:   /80   Pulse 78   Temp (!) 96.1  F (35.6  C) (Tympanic)   Resp 16   Ht 1.556 m (5' 1.25\")   Wt 78.5 kg (173 lb)   SpO2 96%   BMI 32.42 kg/m   Estimated body mass index is 32.42 kg/m  as calculated from the following:    Height as of this encounter: 1.556 m (5' 1.25\").    Weight as of this encounter: 78.5 kg (173 lb).  Physical Exam     GENERAL APPEARANCE: healthy, alert and no distress  EYES: Eyes grossly normal to inspection, PERRL and conjunctivae and sclerae normal  HENT: ear canals and TM's normal  NECK: no adenopathy, no asymmetry, masses, or scars and thyroid normal to palpation  RESP: lungs clear to auscultation - no rales, rhonchi or wheezes  CV: regular rate and rhythm, normal S1 S2, no S3 or S4, no murmur, click or rub, no peripheral edema   ABDOMEN: soft, nontender, no hepatosplenomegaly, no masses and bowel sounds normal  MS: no musculoskeletal defects are noted and gait is age appropriate without ataxia  SKIN: no suspicious lesions or rashes  NEURO: Normal strength and tone, sensory exam grossly normal, mentation intact and speech normal  PSYCH: mentation appears normal and affect normal/bright    ASSESSMENT / PLAN:     Austin was seen today for physical and imm/inj.    Diagnoses and all orders for this visit:    Encounter for Medicare annual wellness exam    Anxiety  ELEAZAR-7 SCORE 10/17/2020 3/26/2021 11/4/2021   Total Score - - -   Total Score 5 (mild anxiety) - -   Total Score 5 3 5   Currently stable on Effexor- mg daily.  Using Lorazepam sparingly.    Recommended trial of Hydroxyzine at bedtime as needed for insomnia.      -     venlafaxine (EFFEXOR-XR) 150 MG 24 hr capsule; Take 1 capsule (150 mg) by mouth daily    High priority for 2019-nCoV vaccine  -     COVID-19,PF,MODERNA (18+ Yrs BOOSTER .25mL)    Encounter for screening mammogram for breast cancer  -     MA SCREENING DIGITAL BILAT - Future  (s+30); Future    Screening for " "lipid disorders  -     Lipid panel reflex to direct LDL Fasting    Screening for diabetes mellitus  -     Basic metabolic panel  (Ca, Cl, CO2, Creat, Gluc, K, Na, BUN)    Vitamin D deficiency  -     Vitamin D3 (CHOLECALCIFEROL) 25 mcg (1000 units) tablet; Take 1 tablet (25 mcg) by mouth daily    Celiac disease  Duodenal ulcer without hemorrhage or perforation and without obstruction  Currently stable and followed by [facility].    -     Probiotic Product (PROBIOTIC DAILY) CAPS; Take 1 capsule by mouth daily  -     Omega-3 Fish Oil 500 MG capsule; Take 1 capsule (500 mg) by mouth 2 times daily  -     omeprazole (PRILOSEC) 20 MG DR capsule; Take 1 capsule (20 mg) by mouth 2 times daily    Other orders  -     REVIEW OF HEALTH MAINTENANCE PROTOCOL ORDERS        COUNSELING:  Reviewed preventive health counseling, as reflected in patient instructions    Estimated body mass index is 32.42 kg/m  as calculated from the following:    Height as of this encounter: 1.556 m (5' 1.25\").    Weight as of this encounter: 78.5 kg (173 lb).      She reports that she quit smoking about 47 years ago. Her smoking use included cigarettes. She started smoking about 52 years ago. She has a 5.00 pack-year smoking history. She has never used smokeless tobacco.      Appropriate preventive services were discussed with this patient, including applicable screening as appropriate for cardiovascular disease, diabetes, osteopenia/osteoporosis, and glaucoma.  As appropriate for age/gender, discussed screening for colorectal cancer, prostate cancer, breast cancer, and cervical cancer. Checklist reviewing preventive services available has been given to the patient.    Reviewed patients plan of care and provided an AVS. The Basic Care Plan (routine screening as documented in Health Maintenance) for Austin meets the Care Plan requirement. This Care Plan has been established and reviewed with the Patient.    Counseling Resources:  ATP IV Guidelines  Pooled " Cohorts Equation Calculator  Breast Cancer Risk Calculator  Breast Cancer: Medication to Reduce Risk  FRAX Risk Assessment  ICSI Preventive Guidelines  Dietary Guidelines for Americans, 2010  USDA's MyPlate  ASA Prophylaxis  Lung CA Screening    Dunia Desai, APRN CNP  [facility]    Identified Health Risks:    She is at risk for lack of exercise and has been provided with information to increase physical activity for the benefit of her well-being.  The patient was provided with written information regarding signs of hearing loss.  The patient was provided with suggestions to help her develop a healthy emotional lifestyle.

## 2021-11-05 DIAGNOSIS — Z13.220 SCREENING FOR LIPID DISORDERS: Primary | ICD-10-CM

## 2021-11-05 LAB
ANION GAP SERPL CALCULATED.3IONS-SCNC: 13 MMOL/L (ref 3–14)
BUN SERPL-MCNC: 13 MG/DL (ref 7–30)
CALCIUM SERPL-MCNC: 9.5 MG/DL (ref 8.5–10.1)
CHLORIDE BLD-SCNC: 105 MMOL/L (ref 94–109)
CHOLEST SERPL-MCNC: 253 MG/DL
CO2 SERPL-SCNC: 20 MMOL/L (ref 20–32)
CREAT SERPL-MCNC: 0.91 MG/DL (ref 0.52–1.04)
FASTING STATUS PATIENT QL REPORTED: YES
GFR SERPL CREATININE-BSD FRML MDRD: 65 ML/MIN/1.73M2
GLUCOSE BLD-MCNC: 88 MG/DL (ref 70–99)
HDLC SERPL-MCNC: 61 MG/DL
LDLC SERPL CALC-MCNC: 172 MG/DL
NONHDLC SERPL-MCNC: 192 MG/DL
POTASSIUM BLD-SCNC: 4.4 MMOL/L (ref 3.4–5.3)
SODIUM SERPL-SCNC: 138 MMOL/L (ref 133–144)
TRIGL SERPL-MCNC: 98 MG/DL

## 2021-11-05 ASSESSMENT — ANXIETY QUESTIONNAIRES: GAD7 TOTAL SCORE: 5

## 2021-11-05 ASSESSMENT — ASTHMA QUESTIONNAIRES: ACT_TOTALSCORE: 21

## 2021-11-05 NOTE — RESULT ENCOUNTER NOTE
Dear Austin,     -LDL(bad) cholesterol level is elevated which can increase your heart disease risk.  A diet high in fat and simple carbohydrates, genetics and being overweight can contribute to this. ADVISE: exercising 150 minutes of aerobic exercise per week (30 minutes for 5 days per week or 50 minutes for 3 days per week are options) and eating a low saturated fat/low carbohydrate diet are helpful to improve this. In 6 months, you should recheck your fasting cholesterol panel by scheduling a lab-only appointment.      -Kidney function is normal (Cr, GFR), Sodium is normal, Potassium is normal, Calcium is normal, Glucose is normal.       Please send a [employer] message or call [phone]  if you have any questions.      TANYA Tavera, CNP  [facility]    If you have further questions about the interpretation of your labs, [email or web address] is a good website to check out for further information.

## 2021-11-10 ENCOUNTER — DOCUMENTATION ONLY (OUTPATIENT)
Dept: OTHER | Facility: CLINIC | Age: 66
End: 2021-11-10
Payer: COMMERCIAL

## 2021-11-23 DIAGNOSIS — F41.9 ANXIETY: ICD-10-CM

## 2021-11-25 NOTE — TELEPHONE ENCOUNTER
Routing refill request to provider for review/approval because:  Drug not on the [facility] refill protocol   Vipin Davies RN, BSN

## 2021-11-26 RX ORDER — LORAZEPAM 0.5 MG/1
TABLET ORAL
Qty: 30 TABLET | Refills: 0 | Status: SHIPPED | OUTPATIENT
Start: 2021-11-26 | End: 2022-05-16

## 2021-12-07 ENCOUNTER — E-VISIT (OUTPATIENT)
Dept: FAMILY MEDICINE | Facility: CLINIC | Age: 66
End: 2021-12-07
Payer: COMMERCIAL

## 2021-12-07 DIAGNOSIS — N39.0 ACUTE UTI (URINARY TRACT INFECTION): Primary | ICD-10-CM

## 2021-12-07 PROCEDURE — 99421 OL DIG E/M SVC 5-10 MIN: CPT | Performed by: NURSE PRACTITIONER

## 2021-12-07 RX ORDER — NITROFURANTOIN 25; 75 MG/1; MG/1
100 CAPSULE ORAL 2 TIMES DAILY
Qty: 10 CAPSULE | Refills: 0 | Status: SHIPPED | OUTPATIENT
Start: 2021-12-07 | End: 2021-12-12

## 2021-12-07 NOTE — PATIENT INSTRUCTIONS
Dear Austin Shaffer    After reviewing your responses, I've been able to diagnose you with a urinary tract infection, which is a common infection of the bladder with bacteria.  This is not a sexually transmitted infection, though urinating immediately after intercourse can help prevent infections.  Drinking lots of fluids is also helpful to clear your current infection and prevent the next one.      I have sent a prescription for antibiotics to your pharmacy to treat this infection.    It is important that you take all of your prescribed medication even if your symptoms are improving after a few doses.  Taking all of your medicine helps prevent the symptoms from returning.     If your symptoms worsen, you develop pain in your back or stomach, develop fevers, or are not improving in 5 days, please contact your primary care provider for an appointment or visit any of our convenient Walk-in or Urgent Care Centers to be seen, which can be found on our website here.    Thanks again for choosing us as your health care partner,    TANYA Tavera CNP    Urinary Tract Infections in Women  Urinary tract infections (UTIs) are most often caused by bacteria. These bacteria enter the urinary tract. The bacteria may come from inside the body. Or they may travel from the skin outside the rectum or vagina into the urethra. Female anatomy makes it easy for bacteria from the bowel to enter a woman s urinary tract, which is the most common source of UTI. This means women develop UTIs more often than men. Pain in or around the urinary tract is a common UTI symptom. But the only way to know for sure if you have a UTI for the healthcare provider to test your urine. The two tests that may be done are the urinalysis and urine culture.     Types of UTIs    Cystitis. A bladder infection (cystitis) is the most common UTI in women. You may have urgent or frequent need to pee. You may also have pain, burning when you pee, and  bloody urine.    Urethritis. This is an inflamed urethra, which is the tube that carries urine from the bladder to outside the body. You may have lower stomach or back pain. You may also have urgent or frequent need to pee.    Pyelonephritis. This is a kidney infection. If not treated, it can be serious and damage your kidneys. In severe cases, you may need to stay in the hospital. You may have a fever and lower back pain.    Medicines to treat a UTI  Most UTIs are treated with antibiotics. These kill the bacteria. The length of time you need to take them depends on the type of infection. It may be as short as 3 days. If you have repeated UTIs, you may need a low-dose antibiotic for several months. Take antibiotics exactly as directed. Don t stop taking them until all of the medicine is gone. If you stop taking the antibiotic too soon, the infection may not go away. You may also develop a resistance to the antibiotic. This can make it much harder to treat.   Lifestyle changes to treat and prevent UTIs   The lifestyle changes below will help get rid of your UTI. They may also help prevent future UTIs.     Drink plenty of fluids. This includes water, juice, or other caffeine-free drinks. Fluids help flush bacteria out of your body.    Empty your bladder. Always empty your bladder when you feel the urge to pee. And always pee before going to sleep. Urine that stays in your bladder can lead to infection. Try to pee before and after sex as well.    Practice good personal hygiene. Wipe yourself from front to back after using the toilet. This helps keep bacteria from getting into the urethra.    Use condoms during sex. These help prevent UTIs caused by sexually transmitted bacteria. Also don't use spermicides during sex. These can increase the risk for UTIs. Choose other forms of birth control instead. For women who tend to get UTIs after sex, a low-dose of a preventive antibiotic may be used. Be sure to discuss this  option with your healthcare provider.    Follow up with your healthcare provider as directed. He or she may test to make sure the infection has cleared. If needed, more treatment may be started.  Teri last reviewed this educational content on 7/1/2019 2000-2021 [employer]. All rights reserved. This information is not intended as a substitute for professional medical care. Always follow your healthcare professional's instructions.

## 2021-12-19 ENCOUNTER — HEALTH MAINTENANCE LETTER (OUTPATIENT)
Age: 66
End: 2021-12-19

## 2022-01-20 ENCOUNTER — TELEPHONE (OUTPATIENT)
Dept: FAMILY MEDICINE | Facility: CLINIC | Age: 67
End: 2022-01-20
Payer: COMMERCIAL

## 2022-01-20 DIAGNOSIS — N30.00 ACUTE CYSTITIS WITHOUT HEMATURIA: Primary | ICD-10-CM

## 2022-01-20 NOTE — TELEPHONE ENCOUNTER
Patient states she has a re occurring bladder infection.   She is requesting a rx be sent to   [facility]   [address]  [facility]   Pharmacy number [phone]    Best number to call patient  [phone]

## 2022-01-20 NOTE — TELEPHONE ENCOUNTER
Please review and advise patient request. Routed to Dunia Desai  To writer's knowledge patient can not do a phone visit, video, or e visit if she is out of the state.     Rhonda MARQUEZ RN   [facility]

## 2022-01-20 NOTE — TELEPHONE ENCOUNTER
Patient inquiring, please advise.     Patient will be heading to [place] tomorrow by noon.     Please reach out to patient once completed @ # [phone], OK to leave detailed msg.     Ronald Temple

## 2022-01-21 RX ORDER — CIPROFLOXACIN 250 MG/1
250 TABLET, FILM COATED ORAL 2 TIMES DAILY
Qty: 6 TABLET | Refills: 0 | Status: SHIPPED | OUTPATIENT
Start: 2022-01-21 | End: 2022-01-24

## 2022-01-21 NOTE — TELEPHONE ENCOUNTER
Reviewed last urine culture from 6/2021.  Plan cipro 250 mg twice daily for 3 days.  With no improvement or worsening patient needs urgent care visit.      - JESSeimiriam, CNP

## 2022-01-21 NOTE — TELEPHONE ENCOUNTER
"Patient calling. Patient reports she is in [place] right now, so this RN cannot fully triage patient per protocol.     Patient does reports she has a bladder infection. Symptoms began 3 days ago, but have worsened over the past 2 days. She has burning with voiding, urinary frequency, and body aches.     Patient states \"I have those pills that turn your pee orange, so I can't tell if there's any blood there.\"    Patient reports she is taking Phenazopyrid \"I take 2 a day, one in the morning and one at night.\" Patient reports she takes this for the \"burning in the bladder. Dunia prescribed it for me.\"  I do not see this medication listed on patient's active medication list. This appears to be last ordered in 2019 by Dr. Martinez in [place] urgent [facility].     Routing to provider to please review and advise on next steps.     Chantale BEGUM RN   [facility]    "

## 2022-01-21 NOTE — TELEPHONE ENCOUNTER
Patient calling back. Patient informed of Dunia Desai's response below and prescription. Patient verbalized understanding and denies questions/concerns.     Chantale BEGUM RN   [facility]

## 2022-05-12 DIAGNOSIS — F41.9 ANXIETY: ICD-10-CM

## 2022-05-13 NOTE — TELEPHONE ENCOUNTER
Routing refill request to provider for review/approval because:  Drug not on the [facility] refill protocol   Jerman MON RN   [facility]

## 2022-05-16 RX ORDER — LORAZEPAM 0.5 MG/1
TABLET ORAL
Qty: 30 TABLET | Refills: 0 | Status: SHIPPED | OUTPATIENT
Start: 2022-05-16 | End: 2022-09-08

## 2022-09-08 DIAGNOSIS — F41.9 ANXIETY: ICD-10-CM

## 2022-09-08 RX ORDER — LORAZEPAM 0.5 MG/1
TABLET ORAL
Qty: 30 TABLET | Refills: 0 | Status: SHIPPED | OUTPATIENT
Start: 2022-09-08 | End: 2022-12-15

## 2022-09-26 ENCOUNTER — TELEPHONE (OUTPATIENT)
Dept: FAMILY MEDICINE | Facility: CLINIC | Age: 67
End: 2022-09-26

## 2022-09-26 ENCOUNTER — MYC MEDICAL ADVICE (OUTPATIENT)
Dept: FAMILY MEDICINE | Facility: CLINIC | Age: 67
End: 2022-09-26

## 2022-09-26 ASSESSMENT — ANXIETY QUESTIONNAIRES
4. TROUBLE RELAXING: SEVERAL DAYS
6. BECOMING EASILY ANNOYED OR IRRITABLE: SEVERAL DAYS
GAD7 TOTAL SCORE: 5
2. NOT BEING ABLE TO STOP OR CONTROL WORRYING: NOT AT ALL
GAD7 TOTAL SCORE: 5
IF YOU CHECKED OFF ANY PROBLEMS ON THIS QUESTIONNAIRE, HOW DIFFICULT HAVE THESE PROBLEMS MADE IT FOR YOU TO DO YOUR WORK, TAKE CARE OF THINGS AT HOME, OR GET ALONG WITH OTHER PEOPLE: SOMEWHAT DIFFICULT
3. WORRYING TOO MUCH ABOUT DIFFERENT THINGS: SEVERAL DAYS
1. FEELING NERVOUS, ANXIOUS, OR ON EDGE: SEVERAL DAYS
5. BEING SO RESTLESS THAT IT IS HARD TO SIT STILL: NOT AT ALL
8. IF YOU CHECKED OFF ANY PROBLEMS, HOW DIFFICULT HAVE THESE MADE IT FOR YOU TO DO YOUR WORK, TAKE CARE OF THINGS AT HOME, OR GET ALONG WITH OTHER PEOPLE?: SOMEWHAT DIFFICULT
GAD7 TOTAL SCORE: 5
7. FEELING AFRAID AS IF SOMETHING AWFUL MIGHT HAPPEN: SEVERAL DAYS
7. FEELING AFRAID AS IF SOMETHING AWFUL MIGHT HAPPEN: SEVERAL DAYS

## 2022-09-26 ASSESSMENT — PATIENT HEALTH QUESTIONNAIRE - PHQ9
SUM OF ALL RESPONSES TO PHQ QUESTIONS 1-9: 10
SUM OF ALL RESPONSES TO PHQ QUESTIONS 1-9: 10
10. IF YOU CHECKED OFF ANY PROBLEMS, HOW DIFFICULT HAVE THESE PROBLEMS MADE IT FOR YOU TO DO YOUR WORK, TAKE CARE OF THINGS AT HOME, OR GET ALONG WITH OTHER PEOPLE: SOMEWHAT DIFFICULT

## 2022-09-26 NOTE — LETTER
24 [address] [id number] [phone]       September 26, 2022    Austin Shaffer  [address] [facility] [id number]    Dear Austin,    We care about your health and have reviewed your health plan and are making recommendations based on this review, to optimize your health.    You are in particular need of attention regarding:  -Breast Cancer Screening    We are recommending that you:                                                                                                                                        -schedule a MAMMOGRAM which is due.         1 in 8 women will develop invasive breast cancer during her lifetime and it is the most common non-skin cancer in American women.  EARLY detection, new treatments, and a better understanding of the disease have increased survival rates - the 5 year survival rate in the 1960s was 63% and today it is close to 90% .      If you are under/uninsured, we recommend you contact the [facility]. They offer mammograms at no charge or on a sliding fee charge. You can schedule with them at [phone]. Please have them send us the results.          Please disregard this reminder if you have had this exam elsewhere within the last year.  It would be helpful for us to have a copy of your mammogram report in our file so that we can best coordinate your care - please contact us with when your test was done so we can update your record.    In addition, here is a list of due or overdue Health Maintenance reminders.    Health Maintenance Due   Topic Date Due     Zoster (Shingles) Vaccine (1 of 2) Never done     Pneumococcal Vaccine (2 - PCV) 01/08/2011     Asthma Action Plan - yearly  10/03/2020     Mammogram  10/03/2021     COVID-19 Vaccine (3 - Booster for Desiree series) 12/30/2021     FALL RISK ASSESSMENT  03/26/2022     Asthma Control Test  05/04/2022     Depression Assessment  05/04/2022      Flu Vaccine (1) 09/01/2022       To address the above recommendations, we encourage you to contact us at [phone], via [employer] or by contacting Central Scheduling toll free at [phone] 24 hours a day. They will assist you with finding the most convenient time and location.    Thank you for trusting [facility] and we appreciate the opportunity to serve you.  We look forward to supporting your healthcare needs in the future.    Healthy Regards,    Your [facility] Team

## 2022-09-26 NOTE — TELEPHONE ENCOUNTER
Needs of attention regarding:  -Asthma  -Depression  -Breast Cancer Screening    Health Maintenance Topics with due status: Overdue       Topic Date Due    ZOSTER IMMUNIZATION Never done    Pneumococcal Vaccine: 65+ Years 01/08/2011    ASTHMA ACTION PLAN 10/03/2020    MAMMO SCREENING 10/03/2021    COVID-19 Vaccine 12/30/2021    FALL RISK ASSESSMENT 03/26/2022    ASTHMA CONTROL TEST 05/04/2022    PHQ-9 05/04/2022    INFLUENZA VACCINE 09/01/2022       Communication:  See Letter

## 2022-09-26 NOTE — TELEPHONE ENCOUNTER
PHQ 3/26/2021 11/4/2021 9/26/2022   PHQ-9 Total Score 5 10 10   Q9: Thoughts of better off dead/self-harm past 2 weeks Not at all Not at all Several days   F/U: Thoughts of suicide or self-harm - - No   F/U: Safety concerns - - No       ELEAZAR-7 SCORE 3/26/2021 11/4/2021 9/26/2022   Total Score - - -   Total Score - - 5 (mild anxiety)   Total Score 3 5 5       Pt recently answered PHQ 9 via [place] per quality management measures please review pt score. Megan Cristobal CMA

## 2022-10-06 DIAGNOSIS — F41.9 ANXIETY: ICD-10-CM

## 2022-10-10 RX ORDER — VENLAFAXINE HYDROCHLORIDE 150 MG/1
CAPSULE, EXTENDED RELEASE ORAL
Qty: 90 CAPSULE | Refills: 0 | OUTPATIENT
Start: 2022-10-10

## 2022-10-15 ENCOUNTER — HEALTH MAINTENANCE LETTER (OUTPATIENT)
Age: 67
End: 2022-10-15

## 2022-11-07 ASSESSMENT — ENCOUNTER SYMPTOMS
PARESTHESIAS: 0
PALPITATIONS: 0
FREQUENCY: 0
DYSURIA: 0
MYALGIAS: 0
COUGH: 0
CONSTIPATION: 0
FEVER: 0
NERVOUS/ANXIOUS: 1
HEMATOCHEZIA: 0
HEARTBURN: 1
NAUSEA: 0
WEAKNESS: 0
DIZZINESS: 0
SHORTNESS OF BREATH: 0
BREAST MASS: 0
ARTHRALGIAS: 0
HEMATURIA: 0
DIARRHEA: 0
CHILLS: 0
JOINT SWELLING: 0
SORE THROAT: 0
EYE PAIN: 0
ABDOMINAL PAIN: 0
HEADACHES: 1

## 2022-11-07 ASSESSMENT — ASTHMA QUESTIONNAIRES
QUESTION_3 LAST FOUR WEEKS HOW OFTEN DID YOUR ASTHMA SYMPTOMS (WHEEZING, COUGHING, SHORTNESS OF BREATH, CHEST TIGHTNESS OR PAIN) WAKE YOU UP AT NIGHT OR EARLIER THAN USUAL IN THE MORNING: NOT AT ALL
QUESTION_4 LAST FOUR WEEKS HOW OFTEN HAVE YOU USED YOUR RESCUE INHALER OR NEBULIZER MEDICATION (SUCH AS ALBUTEROL): ONCE A WEEK OR LESS
QUESTION_5 LAST FOUR WEEKS HOW WOULD YOU RATE YOUR ASTHMA CONTROL: WELL CONTROLLED
QUESTION_2 LAST FOUR WEEKS HOW OFTEN HAVE YOU HAD SHORTNESS OF BREATH: NOT AT ALL
ACT_TOTALSCORE: 23
ACT_TOTALSCORE: 23
QUESTION_1 LAST FOUR WEEKS HOW MUCH OF THE TIME DID YOUR ASTHMA KEEP YOU FROM GETTING AS MUCH DONE AT WORK, SCHOOL OR AT HOME: NONE OF THE TIME

## 2022-11-07 ASSESSMENT — ACTIVITIES OF DAILY LIVING (ADL): CURRENT_FUNCTION: NO ASSISTANCE NEEDED

## 2022-11-08 ENCOUNTER — OFFICE VISIT (OUTPATIENT)
Dept: FAMILY MEDICINE | Facility: CLINIC | Age: 67
End: 2022-11-08
Payer: COMMERCIAL

## 2022-11-08 ENCOUNTER — MYC MEDICAL ADVICE (OUTPATIENT)
Dept: FAMILY MEDICINE | Facility: CLINIC | Age: 67
End: 2022-11-08

## 2022-11-08 VITALS
OXYGEN SATURATION: 99 % | TEMPERATURE: 98.1 F | WEIGHT: 182.9 LBS | HEIGHT: 61 IN | BODY MASS INDEX: 34.53 KG/M2 | DIASTOLIC BLOOD PRESSURE: 88 MMHG | HEART RATE: 83 BPM | SYSTOLIC BLOOD PRESSURE: 128 MMHG

## 2022-11-08 DIAGNOSIS — Z13.220 SCREENING FOR LIPID DISORDERS: ICD-10-CM

## 2022-11-08 DIAGNOSIS — R22.1 NECK SWELLING: ICD-10-CM

## 2022-11-08 DIAGNOSIS — F41.9 ANXIETY: ICD-10-CM

## 2022-11-08 DIAGNOSIS — Z00.00 ENCOUNTER FOR MEDICARE ANNUAL WELLNESS EXAM: Primary | ICD-10-CM

## 2022-11-08 DIAGNOSIS — K26.9 DUODENAL ULCER WITHOUT HEMORRHAGE OR PERFORATION AND WITHOUT OBSTRUCTION: ICD-10-CM

## 2022-11-08 DIAGNOSIS — Z13.1 SCREENING FOR DIABETES MELLITUS: ICD-10-CM

## 2022-11-08 DIAGNOSIS — E55.9 VITAMIN D DEFICIENCY: ICD-10-CM

## 2022-11-08 DIAGNOSIS — Z13.0 SCREENING FOR DEFICIENCY ANEMIA: ICD-10-CM

## 2022-11-08 DIAGNOSIS — Z12.31 ENCOUNTER FOR SCREENING MAMMOGRAM FOR BREAST CANCER: ICD-10-CM

## 2022-11-08 DIAGNOSIS — F33.1 MODERATE RECURRENT MAJOR DEPRESSION (H): ICD-10-CM

## 2022-11-08 LAB
ALBUMIN SERPL-MCNC: 3.8 G/DL (ref 3.4–5)
ALP SERPL-CCNC: 126 U/L (ref 40–150)
ALT SERPL W P-5'-P-CCNC: 19 U/L (ref 0–50)
ANION GAP SERPL CALCULATED.3IONS-SCNC: 6 MMOL/L (ref 3–14)
AST SERPL W P-5'-P-CCNC: 20 U/L (ref 0–45)
BILIRUB SERPL-MCNC: 0.8 MG/DL (ref 0.2–1.3)
BUN SERPL-MCNC: 11 MG/DL (ref 7–30)
CALCIUM SERPL-MCNC: 10.2 MG/DL (ref 8.5–10.1)
CHLORIDE BLD-SCNC: 107 MMOL/L (ref 94–109)
CHOLEST SERPL-MCNC: 247 MG/DL
CO2 SERPL-SCNC: 26 MMOL/L (ref 20–32)
CREAT SERPL-MCNC: 0.88 MG/DL (ref 0.52–1.04)
ERYTHROCYTE [DISTWIDTH] IN BLOOD BY AUTOMATED COUNT: 12.3 % (ref 10–15)
FASTING STATUS PATIENT QL REPORTED: YES
GFR SERPL CREATININE-BSD FRML MDRD: 71 ML/MIN/1.73M2
GLUCOSE BLD-MCNC: 110 MG/DL (ref 70–99)
HCT VFR BLD AUTO: 44.1 % (ref 35–47)
HDLC SERPL-MCNC: 68 MG/DL
HGB BLD-MCNC: 14.7 G/DL (ref 11.7–15.7)
LDLC SERPL CALC-MCNC: 160 MG/DL
MCH RBC QN AUTO: 30.2 PG (ref 26.5–33)
MCHC RBC AUTO-ENTMCNC: 33.3 G/DL (ref 31.5–36.5)
MCV RBC AUTO: 91 FL (ref 78–100)
NONHDLC SERPL-MCNC: 179 MG/DL
PLATELET # BLD AUTO: 304 10E3/UL (ref 150–450)
POTASSIUM BLD-SCNC: 5.2 MMOL/L (ref 3.4–5.3)
PROT SERPL-MCNC: 7.7 G/DL (ref 6.8–8.8)
RBC # BLD AUTO: 4.86 10E6/UL (ref 3.8–5.2)
SODIUM SERPL-SCNC: 139 MMOL/L (ref 133–144)
TRIGL SERPL-MCNC: 95 MG/DL
WBC # BLD AUTO: 6.1 10E3/UL (ref 4–11)

## 2022-11-08 PROCEDURE — 36415 COLL VENOUS BLD VENIPUNCTURE: CPT | Performed by: NURSE PRACTITIONER

## 2022-11-08 PROCEDURE — G0438 PPPS, INITIAL VISIT: HCPCS | Performed by: NURSE PRACTITIONER

## 2022-11-08 PROCEDURE — 99214 OFFICE O/P EST MOD 30 MIN: CPT | Mod: 25 | Performed by: NURSE PRACTITIONER

## 2022-11-08 PROCEDURE — 80053 COMPREHEN METABOLIC PANEL: CPT | Performed by: NURSE PRACTITIONER

## 2022-11-08 PROCEDURE — 80061 LIPID PANEL: CPT | Performed by: NURSE PRACTITIONER

## 2022-11-08 PROCEDURE — 85027 COMPLETE CBC AUTOMATED: CPT | Performed by: NURSE PRACTITIONER

## 2022-11-08 RX ORDER — VENLAFAXINE HYDROCHLORIDE 150 MG/1
150 CAPSULE, EXTENDED RELEASE ORAL DAILY
Qty: 90 CAPSULE | Refills: 0 | Status: SHIPPED | OUTPATIENT
Start: 2022-11-08 | End: 2023-04-17

## 2022-11-08 RX ORDER — VITAMIN B COMPLEX
1 TABLET ORAL DAILY
Qty: 90 TABLET | Refills: 3 | Status: SHIPPED | OUTPATIENT
Start: 2022-11-08

## 2022-11-08 RX ORDER — HYDROXYZINE HYDROCHLORIDE 10 MG/1
10-30 TABLET, FILM COATED ORAL
Qty: 90 TABLET | Refills: 1 | Status: SHIPPED | OUTPATIENT
Start: 2022-11-08 | End: 2023-09-11

## 2022-11-08 ASSESSMENT — ANXIETY QUESTIONNAIRES
GAD7 TOTAL SCORE: 8
5. BEING SO RESTLESS THAT IT IS HARD TO SIT STILL: NOT AT ALL
4. TROUBLE RELAXING: NEARLY EVERY DAY
7. FEELING AFRAID AS IF SOMETHING AWFUL MIGHT HAPPEN: NOT AT ALL
GAD7 TOTAL SCORE: 8
6. BECOMING EASILY ANNOYED OR IRRITABLE: MORE THAN HALF THE DAYS
8. IF YOU CHECKED OFF ANY PROBLEMS, HOW DIFFICULT HAVE THESE MADE IT FOR YOU TO DO YOUR WORK, TAKE CARE OF THINGS AT HOME, OR GET ALONG WITH OTHER PEOPLE?: SOMEWHAT DIFFICULT
IF YOU CHECKED OFF ANY PROBLEMS ON THIS QUESTIONNAIRE, HOW DIFFICULT HAVE THESE PROBLEMS MADE IT FOR YOU TO DO YOUR WORK, TAKE CARE OF THINGS AT HOME, OR GET ALONG WITH OTHER PEOPLE: SOMEWHAT DIFFICULT
1. FEELING NERVOUS, ANXIOUS, OR ON EDGE: SEVERAL DAYS
2. NOT BEING ABLE TO STOP OR CONTROL WORRYING: SEVERAL DAYS
3. WORRYING TOO MUCH ABOUT DIFFERENT THINGS: SEVERAL DAYS
7. FEELING AFRAID AS IF SOMETHING AWFUL MIGHT HAPPEN: NOT AT ALL

## 2022-11-08 ASSESSMENT — ENCOUNTER SYMPTOMS
COUGH: 0
DIARRHEA: 0
DYSURIA: 0
EYE PAIN: 0
SHORTNESS OF BREATH: 0
ARTHRALGIAS: 0
HEARTBURN: 1
MYALGIAS: 0
PALPITATIONS: 0
FEVER: 0
BREAST MASS: 0
FREQUENCY: 0
DIZZINESS: 0
PARESTHESIAS: 0
JOINT SWELLING: 0
HEADACHES: 1
CONSTIPATION: 0
HEMATURIA: 0
HEMATOCHEZIA: 0
NAUSEA: 0
SORE THROAT: 0
ABDOMINAL PAIN: 0
CHILLS: 0
NERVOUS/ANXIOUS: 1
WEAKNESS: 0

## 2022-11-08 ASSESSMENT — PATIENT HEALTH QUESTIONNAIRE - PHQ9
SUM OF ALL RESPONSES TO PHQ QUESTIONS 1-9: 11
SUM OF ALL RESPONSES TO PHQ QUESTIONS 1-9: 11
10. IF YOU CHECKED OFF ANY PROBLEMS, HOW DIFFICULT HAVE THESE PROBLEMS MADE IT FOR YOU TO DO YOUR WORK, TAKE CARE OF THINGS AT HOME, OR GET ALONG WITH OTHER PEOPLE: SOMEWHAT DIFFICULT

## 2022-11-08 ASSESSMENT — ACTIVITIES OF DAILY LIVING (ADL): CURRENT_FUNCTION: NO ASSISTANCE NEEDED

## 2022-11-08 NOTE — PATIENT INSTRUCTIONS
Patient Education   Personalized Prevention Plan  You are due for the preventive services outlined below.  Your care team is available to assist you in scheduling these services.  If you have already completed any of these items, please share that information with your care team to update in your medical record.  Health Maintenance Due   Topic Date Due     Zoster (Shingles) Vaccine (1 of 2) Never done     Pneumococcal Vaccine (2 - PCV) 01/08/2011     Asthma Action Plan - yearly  10/03/2020     Mammogram  10/03/2021     COVID-19 Vaccine (3 - Booster for Desiree series) 12/30/2021     Flu Vaccine (1) 09/01/2022     ANNUAL REVIEW OF HM ORDERS  11/04/2022     Annual Wellness Visit  11/04/2022       Exercise for a Healthier Heart  You may wonder how you can improve the health of your heart. If you re thinking about exercise, you re on the right track. You don t need to become an athlete. But you do need a certain amount of brisk exercise to help strengthen your heart. If you have been diagnosed with a heart condition, your healthcare provider may advise exercise to help stabilize your condition. To help make exercise a habit, choose safe, fun activities.      Exercise with a friend. When activity is fun, you're more likely to stick with it.   Before you start  Check with your healthcare provider before starting an exercise program. This is especially important if you have not been active for a while. It's also important if you have a long-term (chronic) health problem such as heart disease, diabetes, or obesity. Or if you are at high risk for having these problems.   Why exercise?  Exercising regularly offers many healthy rewards. It can help you do all of the following:     Improve your blood cholesterol level to help prevent further heart trouble    Lower your blood pressure to help prevent a stroke or heart attack    Control diabetes, or reduce your risk of getting this disease    Improve your heart and lung  function    Reach and stay at a healthy weight    Make your muscles stronger so you can stay active    Prevent falls and fractures by slowing the loss of bone mass (osteoporosis)    Manage stress better    Reduce your blood pressure    Improve your sense of self and your body image  Exercise tips      Ease into your routine. Set small goals. Then build on them. If you are not sure what your activity level should be, talk with your healthcare provider first before starting an exercise routine.    Exercise on most days. Aim for a total of 150 minutes (2 hours and 30 minutes) or more of moderate-intensity aerobic activity each week. Or 75 minutes (1 hour and 15 minutes) or more of vigorous-intensity aerobic activity each week. Or try for a combination of both. Moderate activity means that you breathe heavier and your heart rate increases but you can still talk. Think about doing 40 minutes of moderate exercise, 3 to 4 times a week. For best results, activity should last for about 40 minutes to lower blood pressure and cholesterol. It's OK to work up to the 40-minute period over time. Examples of moderate-intensity activity are walking 1 mile in 15 minutes. Or doing 30 to 45 minutes of yard work.    Step up your daily activity level.  Along with your exercise program, try being more active the whole day. Walk instead of drive. Or park further away so that you take more steps each day. Do more household tasks or yard work. You may not be able to meet the advised mount of physical activity. But doing some moderate- or vigorous-intensity aerobic activity can help reduce your risk for heart disease. Your healthcare provider can help you figure out what is best for you.    Choose 1 or more activities you enjoy.  Walking is one of the easiest things you can do. You can also try swimming, riding a bike, dancing, or taking an exercise class.    When to call your healthcare provider  Call your healthcare provider if you have any  of these:     Chest pain or feel dizzy or lightheaded    Burning, tightness, pressure, or heaviness in your chest, neck, shoulders, back, or arms    Abnormal shortness of breath    More joint or muscle pain    A very fast or irregular heartbeat (palpitations)  Teri last reviewed this educational content on 7/1/2019 2000-2021 [employer]. All rights reserved. This information is not intended as a substitute for professional medical care. Always follow your healthcare professional's instructions.          Understanding USDA MyPlate  The USDA has guidelines to help you make healthy food choices. These are called MyPlate. MyPlate shows the food groups that make up healthy meals using the image of a place setting. Before you eat, think about the healthiest choices for what to put on your plate or in your cup or bowl. To learn more about building a healthy plate, visit www.choosemyplate.gov.    The food groups    Fruits. Any fruit or 100% fruit juice counts as part of the Fruit Group. Fruits may be fresh, canned, frozen, or dried, and may be whole, cut-up, or pureed. Make 1/2 of your plate fruits and vegetables.    Vegetables. Any vegetable or 100% vegetable juice counts as a member of the Vegetable Group. Vegetables may be fresh, frozen, canned, or dried. They can be served raw or cooked and may be whole, cut-up, or mashed. Make 1/2 of your plate fruits and vegetables.    Grains. All foods made from grains are part of the Grains Group. These include wheat, rice, oats, cornmeal, and barley. Grains are often used to make foods such as bread, pasta, oatmeal, cereal, tortillas, and grits. Grains should be no more than 1/4 of your plate. At least half of your grains should be whole grains.    Protein. This group includes meat, poultry, seafood, beans and peas, eggs, processed soy products (such as tofu), nuts (including nut butters), and seeds. Make protein choices no more than 1/4 of your plate. Meat and  poultry choices should be lean or low fat.    Dairy. The Dairy Group includes all fluid milk products and foods made from milk that contain calcium, such as yogurt and cheese. (Foods that have little calcium, such as cream, butter, and cream cheese, are not part of this group.) Most dairy choices should be low-fat or fat-free.    Oils. Oils aren't a food group, but they do contain essential nutrients. However it's important to watch your intake of oils. These are fats that are liquid at room temperature. They include canola, corn, olive, soybean, vegetable, and sunflower oil. Foods that are mainly oil include mayonnaise, certain salad dressings, and soft margarines. You likely already get your daily oil allowance from the foods you eat.  Things to limit  Eating healthy also means limiting these things in your diet:       Salt (sodium). Many processed foods have a lot of sodium. To keep sodium intake down, eat fresh vegetables, meats, poultry, and seafood when possible. Purchase low-sodium, reduced-sodium, or no-salt-added food products at the store. And don't add salt to your meals at home. Instead, season them with herbs and spices such as dill, oregano, cumin, and paprika. Or try adding flavor with lemon or lime zest and juice.    Saturated fat. Saturated fats are most often found in animal products such as beef, pork, and chicken. They are often solid at room temperature, such as butter. To reduce your saturated fat intake, choose leaner cuts of meat and poultry. And try healthier cooking methods such as grilling, broiling, roasting, or baking. For a simple lower-fat swap, use plain nonfat yogurt instead of mayonnaise when making potato salad or macaroni salad.    Added sugars. These are sugars added to foods. They are in foods such as ice cream, candy, soda, fruit drinks, sports drinks, energy drinks, cookies, pastries, jams, and syrups. Cut down on added sugars by sharing sweet treats with a family member or  friend. You can also choose fruit for dessert, and drink water or other unsweetened beverages.     StayWell last reviewed this educational content on 6/1/2020 2000-2021 [employer]. All rights reserved. This information is not intended as a substitute for professional medical care. Always follow your healthcare professional's instructions.          Signs of Hearing Loss      Hearing much better with one ear can be a sign of hearing loss.   Hearing loss is a problem shared by many people. In fact, it is one of the most common health problems, particularly as people age. Most people age 65 and older have some hearing loss. By age 80, almost everyone does. Hearing loss often occurs slowly over the years. So you may not realize your hearing has gotten worse.  Have your hearing checked  Call your healthcare provider if you:    Have to strain to hear normal conversation    Have to watch other people s faces very carefully to follow what they re saying    Need to ask people to repeat what they ve said    Often misunderstand what people are saying    Turn the volume of the television or radio up so high that others complain    Feel that people are mumbling when they re talking to you    Find that the effort to hear leaves you feeling tired and irritated    Notice, when using the phone, that you hear better with one ear than the other  [employer] last reviewed this educational content on 1/1/2020 2000-2021 [employer]. All rights reserved. This information is not intended as a substitute for professional medical care. Always follow your healthcare professional's instructions.        Your Health Risk Assessment indicates you feel you are not in good emotional health.    Recreation   Recreation is not limited to sports and team events. It includes any activity that provides relaxation, interest, enjoyment, and exercise. Recreation provides an outlet for physical, mental, and social energy. It  can give a sense of worth and achievement. It can help you stay healthy.    Mental Exercise and Social Involvement  Mental and emotional health is as important as physical health. Keep in touch with friends and family. Stay as active as possible. Continue to learn and challenge yourself.   Things you can do to stay mentally active are:    Learn something new, like a foreign language or musical instrument.     Play SCRABBLE or do crossword puzzles. If you cannot find people to play these games with you at home, you can play them with others on your computer through the Internet.     Join a games club--anything from card games to chess or checkers or lawn bowling.     Start a new hobby.     Go back to school.     Volunteer.     Read.   Keep up with world events.

## 2022-11-09 DIAGNOSIS — E83.52 HYPERCALCEMIA: Primary | ICD-10-CM

## 2022-11-10 NOTE — RESULT ENCOUNTER NOTE
Dear Austin,     -LDL(bad) cholesterol level is elevated which can increase your heart disease risk.  A diet high in fat and simple carbohydrates, genetics and being overweight can contribute to this. ADVISE: exercising 150 minutes of aerobic exercise per week (30 minutes for 5 days per week or 50 minutes for 3 days per week are options) and eating a low saturated fat/low carbohydrate diet are helpful to improve this. In 12 months, you should recheck your fasting cholesterol panel.    I would recommend a low dose cholesterol medication.  If you are willing to start this, let me know and I can send this prescription.   The 10-year ASCVD risk score (Nathan PEÑA, et al., 2019) is: 8.7%    Values used to calculate the score:      Age: 67 years      Sex: Female      Is Non- : No      Diabetic: No      Tobacco smoker: No      Systolic Blood Pressure: 128 mmHg      Is BP treated: No      HDL Cholesterol: 68 mg/dL      Total Cholesterol: 247 mg/dL    -Liver and gallbladder tests (ALT,AST, Alk phos,bilirubin) are normal.  -Kidney function (GFR) is normal.  -Sodium is normal.  -Potassium is normal.  -Calcium is elevated.  ADVISE: rechecking this in 1-2 months.  You can schedule a lab  only appt.    -Glucose is slightly elevated and may be a sign of early diabetes (prediabetes). ADVISE:: eating a low carbohydrate diet, exercising, trying to lose weight (if necessary) and rechecking your glucose level in 12 months.      Please send a [employer] message or call [phone]  if you have any questions.      Dunia Desai, TANYA, CNP  [facility]    If you have further questions about the interpretation of your labs, [email or web address] is a good website to check out for further information.    
Dear Austin,     -Normal red blood cell (hgb) levels, normal white blood cell count and normal platelet levels.      Please send a [employer] message or call [phone]  if you have any questions.      TANYA Tavera, [facility]    If you have further questions about the interpretation of your labs, [email or web address] is a good website to check out for further information.    
Statement Selected

## 2022-11-14 NOTE — TELEPHONE ENCOUNTER
PHQ 11/4/2021 9/26/2022 11/8/2022   PHQ-9 Total Score 10 10 11   Q9: Thoughts of better off dead/self-harm past 2 weeks Not at all Several days Not at all   F/U: Thoughts of suicide or self-harm - No -   F/U: Safety concerns - No -     ELEAZAR-7 SCORE 11/4/2021 9/26/2022 11/8/2022   Total Score - - -   Total Score - 5 (mild anxiety) 8 (mild anxiety)   Total Score 5 5 8       Lianet Mora RN, BSN  [facility]

## 2022-12-01 NOTE — TELEPHONE ENCOUNTER
Refill completed.  Please set up virtual visit for anxiety/depression and asthma follow-up. - Ails, CNP   [Bloating (gassiness)] : bloating [Fever] : no fever [Chills] : no chills [Eye Pain] : no eye pain [Loss Of Hearing] : no hearing loss [Chest Pain] : no chest pain [Palpitations] : no palpitations [Abdominal Pain] : no abdominal pain [Vomiting] : no vomiting [Constipation] : no constipation [Diarrhea] : no diarrhea [Heartburn] : no heartburn [Melena (black stool)] : no melena [Bleeding] : no bleeding [Fecal Incontinence (soiling)] : no fecal incontinence [Skin Lesions] : no skin lesions [Depression] : no depression

## 2022-12-07 ENCOUNTER — TRANSFERRED RECORDS (OUTPATIENT)
Dept: HEALTH INFORMATION MANAGEMENT | Facility: CLINIC | Age: 67
End: 2022-12-07

## 2022-12-15 ENCOUNTER — MYC REFILL (OUTPATIENT)
Dept: FAMILY MEDICINE | Facility: CLINIC | Age: 67
End: 2022-12-15

## 2022-12-15 DIAGNOSIS — F41.9 ANXIETY: ICD-10-CM

## 2022-12-15 NOTE — TELEPHONE ENCOUNTER
Routing refill request to provider for review/approval because:  Drug not on the [facility] refill protocol     Janet Carlson RN  [facility]          Statement Selected

## 2022-12-16 RX ORDER — LORAZEPAM 0.5 MG/1
TABLET ORAL
Qty: 30 TABLET | Refills: 0 | Status: SHIPPED | OUTPATIENT
Start: 2022-12-16 | End: 2023-07-07

## 2023-04-14 DIAGNOSIS — F41.9 ANXIETY: ICD-10-CM

## 2023-04-17 RX ORDER — VENLAFAXINE HYDROCHLORIDE 150 MG/1
CAPSULE, EXTENDED RELEASE ORAL
Qty: 90 CAPSULE | Refills: 0 | Status: SHIPPED | OUTPATIENT
Start: 2023-04-17 | End: 2023-07-07

## 2023-05-22 ENCOUNTER — MYC MEDICAL ADVICE (OUTPATIENT)
Dept: FAMILY MEDICINE | Facility: CLINIC | Age: 68
End: 2023-05-22
Payer: COMMERCIAL

## 2023-05-22 NOTE — PROGRESS NOTES
She is at risk for lack of exercise and has been provided with information to increase physical activity for the benefit of her well-being.  The patient was counseled and encouraged to consider modifying their diet and eating habits. She was provided with information on recommended healthy diet options.  The patient was provided with written information regarding signs of hearing loss.  The patient was provided with suggestions to help her develop a healthy emotional lifestyle.   There are no Wet Read(s) to document. There is 1 Wet Read(s) to document.

## 2023-06-02 ENCOUNTER — NURSE TRIAGE (OUTPATIENT)
Dept: FAMILY MEDICINE | Facility: CLINIC | Age: 68
End: 2023-06-02
Payer: COMMERCIAL

## 2023-06-02 NOTE — TELEPHONE ENCOUNTER
"Nurse Triage SBAR    Is this a 2nd Level Triage? YES, LICENSED PRACTITIONER REVIEW IS REQUIRED    Situation: left greater than right leg pain and numbness/tingling    Background: started a year ago, worse the last 2 weeks. Has not discussed with provider before    Assessment: pain with numbness/tingling in left lower leg from toes to calf. Pain more in the backside of calf. Pain 9/10 right now. Epsom salt bath helps. Sensitivity on the bottoms of feet.     Protocol Recommended Disposition:   Go To ED/UCC Now (Or To Office With PCP Approval)    Recommendation: Huddled with AILYN Duque for appt on Monday. Scheduled visit on Monday with provider. If worsening, to go to UC. Patient stated an understanding and agreed with plan.      Does the patient meet one of the following criteria for ADS visit consideration? 16+ years old, with an [facility] PCP     TIP  Providers, please consider if this condition is appropriate for management at one of our Acute and Diagnostic Services sites.     If patient is a good candidate, please use dotphrase <dot>triageresponse and select Refer to ADS to document.     Reason for Disposition    Thigh or calf pain in only one leg and present > 1 hour    Additional Information    Negative: Chest pain    Negative: Difficulty breathing    Negative: Entire foot is cool or blue in comparison to other side    Negative: Unable to walk    Answer Assessment - Initial Assessment Questions  1. ONSET: \"When did the pain start?\"       Started 1 year ago, smaller toes on left side were burning. At night feet felt like they were on fire. Last 2 weeks, worsening symptoms.     2. LOCATION: \"Where is the pain located?\"       Left worse than right. Calf and down to foot. Backside of calf. 2 smallest toes burn.     3. PAIN: \"How bad is the pain?\"    (Scale 1-10; or mild, moderate, severe)    -  MILD (1-3): doesn't interfere with normal activities     -  MODERATE (4-7): interferes with normal activities (e.g., " "work or school) or awakens from sleep, limping     -  SEVERE (8-10): excruciating pain, unable to do any normal activities, unable to walk      Right now its a 9/10. Hot tub with epsom salt helps.  Constant with varying intensity. Worse as the day goes on. No redness or skin changes.     4. WORK OR EXERCISE: \"Has there been any recent work or exercise that involved this part of the body?\"       Golfed 2 days in a row, might have made pain worse.    5. CAUSE: \"What do you think is causing the leg pain?\"      Unsure    6. OTHER SYMPTOMS: \"Do you have any other symptoms?\" (e.g., chest pain, back pain, breathing difficulty, swelling, rash, fever, numbness, weakness)      Numbness and tingling - mostly the left side. Goes all the way up to calf. No difficulty walking. Sometimes my feet are sensitive, walking on the pavement, not a flat surface. Walking on jac.     7. PREGNANCY: \"Is there any chance you are pregnant?\" \"When was your last menstrual period?\"      n/a    Protocols used: LEG PAIN-A-OH      "

## 2023-06-04 ENCOUNTER — OFFICE VISIT (OUTPATIENT)
Dept: URGENT CARE | Facility: URGENT CARE | Age: 68
End: 2023-06-04
Payer: COMMERCIAL

## 2023-06-04 ENCOUNTER — NURSE TRIAGE (OUTPATIENT)
Dept: NURSING | Facility: CLINIC | Age: 68
End: 2023-06-04

## 2023-06-04 VITALS
DIASTOLIC BLOOD PRESSURE: 72 MMHG | TEMPERATURE: 98.5 F | HEART RATE: 77 BPM | SYSTOLIC BLOOD PRESSURE: 116 MMHG | OXYGEN SATURATION: 98 % | RESPIRATION RATE: 18 BRPM

## 2023-06-04 DIAGNOSIS — R30.0 DYSURIA: ICD-10-CM

## 2023-06-04 DIAGNOSIS — N39.0 URINARY TRACT INFECTION WITHOUT HEMATURIA, SITE UNSPECIFIED: ICD-10-CM

## 2023-06-04 DIAGNOSIS — N39.0 URINARY TRACT INFECTION WITHOUT HEMATURIA, SITE UNSPECIFIED: Primary | ICD-10-CM

## 2023-06-04 LAB
ALBUMIN UR-MCNC: >=300 MG/DL
APPEARANCE UR: ABNORMAL
BACTERIA #/AREA URNS HPF: ABNORMAL /HPF
BILIRUB UR QL STRIP: NEGATIVE
COLOR UR AUTO: YELLOW
GLUCOSE UR STRIP-MCNC: NEGATIVE MG/DL
HGB UR QL STRIP: ABNORMAL
KETONES UR STRIP-MCNC: NEGATIVE MG/DL
LEUKOCYTE ESTERASE UR QL STRIP: ABNORMAL
NITRATE UR QL: NEGATIVE
PH UR STRIP: 6 [PH] (ref 5–7)
RBC #/AREA URNS AUTO: ABNORMAL /HPF
SP GR UR STRIP: 1.02 (ref 1–1.03)
SQUAMOUS #/AREA URNS AUTO: ABNORMAL /LPF
UROBILINOGEN UR STRIP-ACNC: 0.2 E.U./DL
WBC #/AREA URNS AUTO: >100 /HPF

## 2023-06-04 PROCEDURE — 81001 URINALYSIS AUTO W/SCOPE: CPT

## 2023-06-04 PROCEDURE — 87186 SC STD MICRODIL/AGAR DIL: CPT | Performed by: FAMILY MEDICINE

## 2023-06-04 PROCEDURE — 87086 URINE CULTURE/COLONY COUNT: CPT | Performed by: FAMILY MEDICINE

## 2023-06-04 PROCEDURE — 99213 OFFICE O/P EST LOW 20 MIN: CPT | Performed by: FAMILY MEDICINE

## 2023-06-04 RX ORDER — CEPHALEXIN 500 MG/1
500 CAPSULE ORAL 2 TIMES DAILY
Qty: 20 CAPSULE | Refills: 0 | Status: SHIPPED | OUTPATIENT
Start: 2023-06-04 | End: 2024-08-07

## 2023-06-04 RX ORDER — CEPHALEXIN 500 MG/1
500 CAPSULE ORAL 2 TIMES DAILY
Qty: 20 CAPSULE | Refills: 0 | Status: SHIPPED | OUTPATIENT
Start: 2023-06-04 | End: 2023-06-04

## 2023-06-04 NOTE — TELEPHONE ENCOUNTER
Pt calling that her Rx for UTI she was prescribed today 6/4/23 [facility] which is closed, pt requesting Rx transferred to [facility] [place], [place].  Rx transferred per RN med refill protocol.  Rocio Jackson RN  [facility] Nurse Advisor    Reason for Disposition    [1] Prescription prescribed recently is not at pharmacy AND [2] triager has access to patient's EMR AND [3] prescription is recorded in the EMR    Additional Information    Negative: [1] Intentional drug overdose AND [2] suicidal thoughts or ideas    Negative: Drug overdose and triager unable to answer question    Negative: Caller requesting a renewal or refill of a medicine patient is currently taking    Negative: Caller requesting information unrelated to medicine    Negative: Caller requesting information about COVID-19 Vaccine    Negative: Caller requesting information about Emergency Contraception    Negative: Caller requesting information about Combined Birth Control Pills    Negative: Caller requesting information about Progestin Birth Control Pills    Negative: Caller requesting information about Post-Op pain or medicines    Negative: Caller requesting a prescription antibiotic (such as Penicillin) for Strep throat and has a positive culture result    Negative: Caller requesting a prescription anti-viral med (such as Tamiflu) and has influenza (flu) symptoms    Negative: Immunization reaction suspected    Negative: Rash while taking a medicine or within 3 days of stopping it    Negative: [1] Asthma and [2] having symptoms of asthma (cough, wheezing, etc.)    Negative: [1] Symptom of illness (e.g., headache, abdominal pain, earache, vomiting) AND [2] more than mild    Negative: Breastfeeding questions about mother's medicines and diet    Negative: MORE THAN A DOUBLE DOSE of a prescription or over-the-counter (OTC) drug    Negative: [1] DOUBLE DOSE (an extra dose or lesser amount) of prescription drug AND [2] any symptoms  (e.g., dizziness, nausea, pain, sleepiness)    Negative: [1] DOUBLE DOSE (an extra dose or lesser amount) of over-the-counter (OTC) drug AND [2] any symptoms (e.g., dizziness, nausea, pain, sleepiness)    Negative: Took another person's prescription drug    Negative: [1] DOUBLE DOSE (an extra dose or lesser amount) of prescription drug AND [2] NO symptoms (Exception: a double dose of antibiotics)    Negative: Diabetes drug error or overdose (e.g., took wrong type of insulin or took extra dose)    Negative: [1] Prescription not at pharmacy AND [2] was prescribed by PCP recently (Exception: triager has access to EMR and prescription is recorded there. Go to Home Care and confirm for pharmacy.)    Negative: [1] Pharmacy calling with prescription question AND [2] triager unable to answer question    Negative: [1] Caller has URGENT medicine question about med that PCP or specialist prescribed AND [2] triager unable to answer question    Negative: Medicine patch causing local rash or itching    Negative: [1] Caller has medicine question about med NOT prescribed by PCP AND [2] triager unable to answer question (e.g., compatibility with other med, storage)    Negative: Prescription request for new medicine (not a refill)    Negative: [1] Caller has NON-URGENT medicine question about med that PCP prescribed AND [2] triager unable to answer question    Negative: Caller wants to use a complementary or alternative medicine    Protocols used: MEDICATION QUESTION CALL-A-

## 2023-06-04 NOTE — PROGRESS NOTES
Assessment & Plan     Dysuria    - UA Macroscopic with reflex to Microscopic and Culture  - UA Macroscopic with reflex to Microscopic and Culture  - UA Microscopic with Reflex to Culture  - Urine Culture    Urinary tract infection without hematuria, site unspecified    - cephALEXin (KEFLEX) 500 MG capsule  Dispense: 20 capsule; Refill: 0             No follow-ups on file.    Deng Santoyo MD  [facility] CARE MAGGI Avila is a 67 year old female who presents to clinic today for the following health issues:  Chief Complaint   Patient presents with     Urinary Problem     Pt reports burning, frequency X 2 days.   Pt reports hx of UTIs.      HPI    Here with frequent urination and urgent.  Burning.  Started yesterday.  No fever or abdominal pain  Some pain on right side back.  Has taken some meds at home but not aware what it is.        Review of Systems        Objective    /72 (BP Location: Right arm, Patient Position: Sitting, Cuff Size: Adult Regular)   Pulse 77   Temp 98.5  F (36.9  C) (Tympanic)   Resp 18   SpO2 98%   Physical Exam  Vitals and nursing note reviewed.   Constitutional:       Appearance: Normal appearance.   Eyes:      Pupils: Pupils are equal, round, and reactive to light.   Cardiovascular:      Rate and Rhythm: Normal rate and regular rhythm.      Pulses: Normal pulses.      Heart sounds: Normal heart sounds.   Pulmonary:      Effort: Pulmonary effort is normal.      Breath sounds: Normal breath sounds.   Abdominal:      Tenderness: There is no right CVA tenderness or left CVA tenderness.   Neurological:      Mental Status: She is alert.

## 2023-06-05 ENCOUNTER — OFFICE VISIT (OUTPATIENT)
Dept: FAMILY MEDICINE | Facility: CLINIC | Age: 68
End: 2023-06-05
Payer: COMMERCIAL

## 2023-06-05 VITALS
HEIGHT: 61 IN | DIASTOLIC BLOOD PRESSURE: 68 MMHG | RESPIRATION RATE: 14 BRPM | BODY MASS INDEX: 33.23 KG/M2 | WEIGHT: 176 LBS | OXYGEN SATURATION: 96 % | HEART RATE: 100 BPM | SYSTOLIC BLOOD PRESSURE: 120 MMHG | TEMPERATURE: 98 F

## 2023-06-05 DIAGNOSIS — R73.03 PREDIABETES: ICD-10-CM

## 2023-06-05 DIAGNOSIS — R79.9 ABNORMAL FINDING OF BLOOD CHEMISTRY, UNSPECIFIED: ICD-10-CM

## 2023-06-05 DIAGNOSIS — Z13.21 ENCOUNTER FOR VITAMIN DEFICIENCY SCREENING: ICD-10-CM

## 2023-06-05 DIAGNOSIS — Z13.1 SCREENING FOR DIABETES MELLITUS: ICD-10-CM

## 2023-06-05 DIAGNOSIS — F33.1 MODERATE RECURRENT MAJOR DEPRESSION (H): ICD-10-CM

## 2023-06-05 DIAGNOSIS — G62.9 NEUROPATHY: Primary | ICD-10-CM

## 2023-06-05 DIAGNOSIS — Z23 ENCOUNTER FOR IMMUNIZATION: ICD-10-CM

## 2023-06-05 DIAGNOSIS — Z13.0 SCREENING FOR DEFICIENCY ANEMIA: ICD-10-CM

## 2023-06-05 DIAGNOSIS — Z13.29 THYROID DISORDER SCREENING: ICD-10-CM

## 2023-06-05 LAB
ALBUMIN SERPL BCG-MCNC: 4.2 G/DL (ref 3.5–5.2)
ALP SERPL-CCNC: 119 U/L (ref 35–104)
ALT SERPL W P-5'-P-CCNC: 14 U/L (ref 10–35)
ANION GAP SERPL CALCULATED.3IONS-SCNC: 12 MMOL/L (ref 7–15)
AST SERPL W P-5'-P-CCNC: 21 U/L (ref 10–35)
BILIRUB SERPL-MCNC: 0.5 MG/DL
BUN SERPL-MCNC: 10.6 MG/DL (ref 8–23)
CALCIUM SERPL-MCNC: 9.5 MG/DL (ref 8.8–10.2)
CHLORIDE SERPL-SCNC: 102 MMOL/L (ref 98–107)
CREAT SERPL-MCNC: 0.82 MG/DL (ref 0.51–0.95)
DEPRECATED HCO3 PLAS-SCNC: 26 MMOL/L (ref 22–29)
ERYTHROCYTE [DISTWIDTH] IN BLOOD BY AUTOMATED COUNT: 12.4 % (ref 10–15)
FERRITIN SERPL-MCNC: 303 NG/ML (ref 11–328)
GFR SERPL CREATININE-BSD FRML MDRD: 77 ML/MIN/1.73M2
GLUCOSE SERPL-MCNC: 119 MG/DL (ref 70–99)
HBA1C MFR BLD: 5.5 % (ref 0–5.6)
HCT VFR BLD AUTO: 44.8 % (ref 35–47)
HGB BLD-MCNC: 14.5 G/DL (ref 11.7–15.7)
IRON BINDING CAPACITY (ROCHE): 242 UG/DL (ref 240–430)
IRON SATN MFR SERPL: 21 % (ref 15–46)
IRON SERPL-MCNC: 52 UG/DL (ref 37–145)
MCH RBC QN AUTO: 29.3 PG (ref 26.5–33)
MCHC RBC AUTO-ENTMCNC: 32.4 G/DL (ref 31.5–36.5)
MCV RBC AUTO: 91 FL (ref 78–100)
PLATELET # BLD AUTO: 339 10E3/UL (ref 150–450)
POTASSIUM SERPL-SCNC: 4.8 MMOL/L (ref 3.4–5.3)
PROT SERPL-MCNC: 7.5 G/DL (ref 6.4–8.3)
RBC # BLD AUTO: 4.95 10E6/UL (ref 3.8–5.2)
SODIUM SERPL-SCNC: 140 MMOL/L (ref 136–145)
TSH SERPL DL<=0.005 MIU/L-ACNC: 0.91 UIU/ML (ref 0.3–4.2)
VIT B12 SERPL-MCNC: 809 PG/ML (ref 232–1245)
WBC # BLD AUTO: 6.9 10E3/UL (ref 4–11)

## 2023-06-05 PROCEDURE — 90677 PCV20 VACCINE IM: CPT | Performed by: NURSE PRACTITIONER

## 2023-06-05 PROCEDURE — 85027 COMPLETE CBC AUTOMATED: CPT | Performed by: NURSE PRACTITIONER

## 2023-06-05 PROCEDURE — G0009 ADMIN PNEUMOCOCCAL VACCINE: HCPCS | Performed by: NURSE PRACTITIONER

## 2023-06-05 PROCEDURE — 84443 ASSAY THYROID STIM HORMONE: CPT | Performed by: NURSE PRACTITIONER

## 2023-06-05 PROCEDURE — 83550 IRON BINDING TEST: CPT | Performed by: NURSE PRACTITIONER

## 2023-06-05 PROCEDURE — 82607 VITAMIN B-12: CPT | Performed by: NURSE PRACTITIONER

## 2023-06-05 PROCEDURE — 83540 ASSAY OF IRON: CPT | Performed by: NURSE PRACTITIONER

## 2023-06-05 PROCEDURE — 80053 COMPREHEN METABOLIC PANEL: CPT | Performed by: NURSE PRACTITIONER

## 2023-06-05 PROCEDURE — 99214 OFFICE O/P EST MOD 30 MIN: CPT | Mod: 25 | Performed by: NURSE PRACTITIONER

## 2023-06-05 PROCEDURE — 36415 COLL VENOUS BLD VENIPUNCTURE: CPT | Performed by: NURSE PRACTITIONER

## 2023-06-05 PROCEDURE — 82728 ASSAY OF FERRITIN: CPT | Performed by: NURSE PRACTITIONER

## 2023-06-05 PROCEDURE — 83036 HEMOGLOBIN GLYCOSYLATED A1C: CPT | Performed by: NURSE PRACTITIONER

## 2023-06-05 ASSESSMENT — PATIENT HEALTH QUESTIONNAIRE - PHQ9
10. IF YOU CHECKED OFF ANY PROBLEMS, HOW DIFFICULT HAVE THESE PROBLEMS MADE IT FOR YOU TO DO YOUR WORK, TAKE CARE OF THINGS AT HOME, OR GET ALONG WITH OTHER PEOPLE: SOMEWHAT DIFFICULT
SUM OF ALL RESPONSES TO PHQ QUESTIONS 1-9: 7
SUM OF ALL RESPONSES TO PHQ QUESTIONS 1-9: 7

## 2023-06-05 ASSESSMENT — ASTHMA QUESTIONNAIRES
QUESTION_2 LAST FOUR WEEKS HOW OFTEN HAVE YOU HAD SHORTNESS OF BREATH: ONCE OR TWICE A WEEK
QUESTION_3 LAST FOUR WEEKS HOW OFTEN DID YOUR ASTHMA SYMPTOMS (WHEEZING, COUGHING, SHORTNESS OF BREATH, CHEST TIGHTNESS OR PAIN) WAKE YOU UP AT NIGHT OR EARLIER THAN USUAL IN THE MORNING: NOT AT ALL
ACT_TOTALSCORE: 22
ACT_TOTALSCORE: 22
QUESTION_1 LAST FOUR WEEKS HOW MUCH OF THE TIME DID YOUR ASTHMA KEEP YOU FROM GETTING AS MUCH DONE AT WORK, SCHOOL OR AT HOME: NONE OF THE TIME
QUESTION_5 LAST FOUR WEEKS HOW WOULD YOU RATE YOUR ASTHMA CONTROL: WELL CONTROLLED
QUESTION_4 LAST FOUR WEEKS HOW OFTEN HAVE YOU USED YOUR RESCUE INHALER OR NEBULIZER MEDICATION (SUCH AS ALBUTEROL): ONCE A WEEK OR LESS

## 2023-06-05 NOTE — PROGRESS NOTES
"  Assessment & Plan     Neuropathy  - Pneumococcal 20 Valent Conjugate (PCV20)  - Vitamin B12  - CBC with platelets  - Iron and iron binding capacity  - TSH with free T4 reflex  - Ferritin  - Hemoglobin A1c  - Comprehensive metabolic panel (BMP + Alb, Alk Phos, ALT, AST, Total. Bili, TP)  - Vitamin B12  - CBC with platelets  - Iron and iron binding capacity  - TSH with free T4 reflex  - Ferritin  - Hemoglobin A1c  - Comprehensive metabolic panel (BMP + Alb, Alk Phos, ALT, AST, Total. Bili, TP)  - EMG    Screening for deficiency anemia  - Iron and iron binding capacity  - Ferritin  - Iron and iron binding capacity  - Ferritin    Screening for diabetes mellitus  - Hemoglobin A1c  - Comprehensive metabolic panel (BMP + Alb, Alk Phos, ALT, AST, Total. Bili, TP)  - Hemoglobin A1c  - Comprehensive metabolic panel (BMP + Alb, Alk Phos, ALT, AST, Total. Bili, TP)    Encounter for vitamin deficiency screening  - Vitamin B12  - Vitamin B12    Thyroid disorder screening  - TSH with free T4 reflex  - TSH with free T4 reflex    Encounter for immunization  - Pneumococcal 20 Valent Conjugate (PCV20)    Abnormal finding of blood chemistry, unspecified  - Iron and iron binding capacity  - Ferritin  - Hemoglobin A1c  - Iron and iron binding capacity  - Ferritin  - Hemoglobin A1c    Prediabetes  - TSH with free T4 reflex  - TSH with free T4 reflex      Review of the result(s) of each unique test - lab  Ordering of each unique test       BMI:   Estimated body mass index is 33.25 kg/m  as calculated from the following:    Height as of this encounter: 1.549 m (5' 1\").    Weight as of this encounter: 79.8 kg (176 lb).       Ryann Yip, [facility]    Sophie Avila is a 67 year old, presenting for the following health issues:  Musculoskeletal Problem        6/5/2023    12:50 PM   Additional Questions   Roomed by Stanton WAGGONER   Accompanied by Self     Musculoskeletal Problem    History of Present Illness " "      Reason for visit:  Feet and caff feel like they just woke up  feet burning at night  Symptom onset:  1-2 weeks ago  Symptom intensity:  Moderate  Symptom progression:  Worsening  Had these symptoms before:  No  What makes it worse:  Sitting  What makes it better:  No    She eats 0-1 servings of fruits and vegetables daily.She consumes 0 sweetened beverage(s) daily.She exercises with enough effort to increase her heart rate 9 or less minutes per day.  She exercises with enough effort to increase her heart rate 3 or less days per week.   She is taking medications regularly.    Today's PHQ-9         PHQ-9 Total Score: 7    PHQ-9 Q9 Thoughts of better off dead/self-harm past 2 weeks :   Not at all    How difficult have these problems made it for you to do your work, take care of things at home, or get along with other people: Somewhat difficult       Starting about a year ago left toes would be tingling and burning. And then eventually hands and feet during the day and feet at night. Feeling of cold or hot is \"off.\" Will try to to cool down feet at night and that helps a little. Has tried shayy-nesbitt and lotions.    Hot feet feeling will keep her awake at night.       Triaged 06/02/2023 - 4:05p - Janet Carlson RN  Nurse Triage SBAR     Is this a 2nd Level Triage? YES, LICENSED PRACTITIONER REVIEW IS REQUIRED     Situation: left greater than right leg pain and numbness/tingling     Background: started a year ago, worse the last 2 weeks. Has not discussed with provider before     Assessment: pain with numbness/tingling in left lower leg from toes to calf. Pain more in the backside of calf. Pain 9/10 right now. Epsom salt bath helps. Sensitivity on the bottoms of feet.      Protocol Recommended Disposition:   Go To ED/UCC Now (Or To Office With PCP Approval)     Recommendation: Huddled with MARGA Duque. Ok for appt on Monday. Scheduled visit on Monday with provider. If worsening, to go to UC. Patient stated an " "understanding and agreed with plan.       Does the patient meet one of the following criteria for ADS visit consideration? 16+ years old, with an [facility] PCP      TIP  Providers, please consider if this condition is appropriate for management at one of our Acute and Diagnostic Services sites.      If patient is a good candidate, please use dotphrase <dot>triageresponse and select Refer to ADS to document.      Reason for Disposition    Thigh or calf pain in only one leg and present > 1 hour    Additional Information    Negative: Chest pain    Negative: Difficulty breathing    Negative: Entire foot is cool or blue in comparison to other side    Negative: Unable to walk    Answer Assessment - Initial Assessment Questions  1. ONSET: \"When did the pain start?\"       Started 1 year ago, smaller toes on left side were burning. At night feet felt like they were on fire. Last 2 weeks, worsening symptoms.     2. LOCATION: \"Where is the pain located?\"       Left worse than right. Calf and down to foot. Backside of calf. 2 smallest toes burn.     3. PAIN: \"How bad is the pain?\"    (Scale 1-10; or mild, moderate, severe)    -  MILD (1-3): doesn't interfere with normal activities     -  MODERATE (4-7): interferes with normal activities (e.g., work or school) or awakens from sleep, limping     -  SEVERE (8-10): excruciating pain, unable to do any normal activities, unable to walk      Right now its a 9/10. Hot tub with epsom salt helps.  Constant with varying intensity. Worse as the day goes on. No redness or skin changes.     4. WORK OR EXERCISE: \"Has there been any recent work or exercise that involved this part of the body?\"       Golfed 2 days in a row, might have made pain worse.    5. CAUSE: \"What do you think is causing the leg pain?\"      Unsure    6. OTHER SYMPTOMS: \"Do you have any other symptoms?\" (e.g., chest pain, back pain, breathing difficulty, swelling, rash, fever, numbness, weakness)      Numbness and tingling - " "mostly the left side. Goes all the way up to calf. No difficulty walking. Sometimes my feet are sensitive, walking on the pavement, not a flat surface. Walking on jac.     7. PREGNANCY: \"Is there any chance you are pregnant?\" \"When was your last menstrual period?\"      n/a    Protocols used: LEG PAIN-A-OH          Review of Systems   Constitutional, HEENT, cardiovascular, pulmonary, GI, , musculoskeletal, neuro, skin, endocrine and psych systems are negative, except as otherwise noted.      Objective    /68 (BP Location: Right arm, Patient Position: Chair, Cuff Size: Adult Large)   Pulse 100   Temp 98  F (36.7  C) (Tympanic)   Resp 14   Ht 1.549 m (5' 1\")   Wt 79.8 kg (176 lb)   SpO2 96%   BMI 33.25 kg/m    Body mass index is 33.25 kg/m .  Physical Exam   GENERAL: healthy, alert and no distress  NECK: no adenopathy, no asymmetry, masses, or scars and thyroid normal to palpation  RESP: lungs clear to auscultation - no rales, rhonchi or wheezes  CV: regular rate and rhythm, normal S1 S2, no S3 or S4, no murmur, click or rub, no peripheral edema and peripheral pulses strong  ABDOMEN: soft, nontender, no hepatosplenomegaly, no masses and bowel sounds normal  MS: no gross musculoskeletal defects noted, no edema  NEURO: Normal strength and tone, mentation intact and speech normal, monofilament exam normal                    Answers for HPI/ROS submitted by the patient on 6/5/2023  If you checked off any problems, how difficult have these problems made it for you to do your work, take care of things at home, or get along with other people?: Somewhat difficult  PHQ9 TOTAL SCORE: 7      "

## 2023-06-06 LAB — BACTERIA UR CULT: ABNORMAL

## 2023-06-13 ENCOUNTER — HOSPITAL ENCOUNTER (OUTPATIENT)
Dept: MAMMOGRAPHY | Facility: CLINIC | Age: 68
Discharge: HOME | End: 2023-06-13
Attending: NURSE PRACTITIONER
Payer: COMMERCIAL

## 2023-06-13 ENCOUNTER — HOSPITAL ENCOUNTER (OUTPATIENT)
Dept: ULTRASOUND IMAGING | Facility: CLINIC | Age: 68
Discharge: HOME | End: 2023-06-13
Attending: NURSE PRACTITIONER
Payer: COMMERCIAL

## 2023-06-13 DIAGNOSIS — R22.1 NECK SWELLING: ICD-10-CM

## 2023-06-13 DIAGNOSIS — Z12.31 ENCOUNTER FOR SCREENING MAMMOGRAM FOR BREAST CANCER: ICD-10-CM

## 2023-06-13 PROCEDURE — 77067 SCR MAMMO BI INCL CAD: CPT

## 2023-06-13 PROCEDURE — 76536 US EXAM OF HEAD AND NECK: CPT

## 2023-06-13 NOTE — RESULT ENCOUNTER NOTE
Dear Austin,     Your recent ultrasound was overall normal and reassuring, there were no noted abnormalities around area of neck swelling.        Dunia Desai, APRN, CNP  [facility]

## 2023-07-06 ENCOUNTER — MYC MEDICAL ADVICE (OUTPATIENT)
Dept: FAMILY MEDICINE | Facility: CLINIC | Age: 68
End: 2023-07-06
Payer: COMMERCIAL

## 2023-07-06 DIAGNOSIS — F41.9 ANXIETY: ICD-10-CM

## 2023-07-07 ENCOUNTER — MYC MEDICAL ADVICE (OUTPATIENT)
Dept: FAMILY MEDICINE | Facility: CLINIC | Age: 68
End: 2023-07-07
Payer: COMMERCIAL

## 2023-07-07 ENCOUNTER — TELEPHONE (OUTPATIENT)
Dept: FAMILY MEDICINE | Facility: CLINIC | Age: 68
End: 2023-07-07
Payer: COMMERCIAL

## 2023-07-07 RX ORDER — LORAZEPAM 0.5 MG/1
TABLET ORAL
Qty: 30 TABLET | Refills: 0 | Status: SHIPPED | OUTPATIENT
Start: 2023-07-07 | End: 2023-11-14

## 2023-07-07 RX ORDER — VENLAFAXINE HYDROCHLORIDE 150 MG/1
CAPSULE, EXTENDED RELEASE ORAL
Qty: 90 CAPSULE | Refills: 1 | Status: SHIPPED | OUTPATIENT
Start: 2023-07-07 | End: 2024-02-28

## 2023-07-07 NOTE — TELEPHONE ENCOUNTER
Prior Authorization Retail Medication Request    Medication/Dose: CoverMyMeds Prior Authorization for LORazepam 0.5MG Tablets  ICD code (if different than what is on RX):    Previously Tried and Failed:    Rationale:      Insurance Name:    Insurance ID:  [id number]      Pharmacy Information (if different than what is on RX)  Name:  [facility]  Phone:  [phone]

## 2023-07-07 NOTE — TELEPHONE ENCOUNTER
Forms/Letter Request    Type of form/letter: CoverMyMeds Prior Authorization for LORazepam 0.5MG Tablets    Have you been seen for this request: N/A    Do we have the form/letter: Yes:  Yellow PA folder    Who is the form from? [employer] (if other please explain)    Where did/will the form come from? form was faxed in

## 2023-07-11 DIAGNOSIS — G62.9 NEUROPATHY: Primary | ICD-10-CM

## 2023-07-11 RX ORDER — GABAPENTIN 300 MG/1
300 CAPSULE ORAL
Qty: 90 CAPSULE | Refills: 1 | Status: SHIPPED | OUTPATIENT
Start: 2023-07-11 | End: 2024-05-14

## 2023-07-12 NOTE — TELEPHONE ENCOUNTER
Central Prior Authorization Team   Phone: [phone]    PA Initiation    Medication: LORazepam 0.5MG Tablets  Insurance Company: [facility] - Phone [phone] Fax [phone]  Pharmacy Filling the Rx: [facility] [address] [place] [address] [address]  Filling Pharmacy Phone: [phone]  Filling Pharmacy Fax:    Start Date: 7/12/2023

## 2023-07-12 NOTE — TELEPHONE ENCOUNTER
Prior Authorization Approval    Authorization Effective Date: 4/13/2023  Authorization Expiration Date: 7/12/2024  Medication: LORazepam 0.5MG Tablets  Approved Dose/Quantity:    Reference #:     Insurance Company: [employer] [place] - Phone [phone] Fax [phone]  Expected CoPay:       CoPay Card Available:      Foundation Assistance Needed:    Which Pharmacy is filling the prescription (Not needed for infusion/clinic administered): [facility] [address] [place], [place] [address] - [address]  Pharmacy Notified: Yes  Patient Notified: Yes  **Instructed pharmacy to notify patient when script is ready to /ship.**

## 2023-09-08 DIAGNOSIS — F41.9 ANXIETY: ICD-10-CM

## 2023-09-11 RX ORDER — HYDROXYZINE HYDROCHLORIDE 10 MG/1
TABLET, FILM COATED ORAL
Qty: 90 TABLET | Refills: 0 | Status: SHIPPED | OUTPATIENT
Start: 2023-09-11 | End: 2023-11-14

## 2023-10-09 ENCOUNTER — PATIENT OUTREACH (OUTPATIENT)
Dept: CARE COORDINATION | Facility: CLINIC | Age: 68
End: 2023-10-09
Payer: COMMERCIAL

## 2023-10-23 ENCOUNTER — PATIENT OUTREACH (OUTPATIENT)
Dept: CARE COORDINATION | Facility: CLINIC | Age: 68
End: 2023-10-23
Payer: COMMERCIAL

## 2023-11-04 NOTE — TELEPHONE ENCOUNTER
Please see patient's MyChart message - this was in response to alternate message from 4/30/18. Thank you.  Luisa Sim RN, BSN  [facility]   [address] [place] [address]

## 2023-11-06 ENCOUNTER — MYC MEDICAL ADVICE (OUTPATIENT)
Dept: FAMILY MEDICINE | Facility: CLINIC | Age: 68
End: 2023-11-06
Payer: COMMERCIAL

## 2023-11-06 DIAGNOSIS — L30.9 DERMATITIS: ICD-10-CM

## 2023-11-07 RX ORDER — CLOBETASOL PROPIONATE 0.5 MG/G
OINTMENT TOPICAL
Qty: 60 G | Refills: 3 | Status: SHIPPED | OUTPATIENT
Start: 2023-11-07

## 2023-11-08 ASSESSMENT — ENCOUNTER SYMPTOMS
DIARRHEA: 0
PARESTHESIAS: 0
CHILLS: 0
FEVER: 0
COUGH: 0
SORE THROAT: 0
MYALGIAS: 0
DIZZINESS: 0
NERVOUS/ANXIOUS: 1
ARTHRALGIAS: 0
NAUSEA: 0
JOINT SWELLING: 0
CONSTIPATION: 0
BREAST MASS: 0
PALPITATIONS: 0
ABDOMINAL PAIN: 0
HEMATURIA: 0
HEADACHES: 0
SHORTNESS OF BREATH: 0
WEAKNESS: 0
EYE PAIN: 0
HEMATOCHEZIA: 0
DYSURIA: 0
FREQUENCY: 1
HEARTBURN: 1

## 2023-11-08 ASSESSMENT — ACTIVITIES OF DAILY LIVING (ADL): CURRENT_FUNCTION: NO ASSISTANCE NEEDED

## 2023-11-09 ENCOUNTER — TELEPHONE (OUTPATIENT)
Dept: FAMILY MEDICINE | Facility: CLINIC | Age: 68
End: 2023-11-09
Payer: COMMERCIAL

## 2023-11-09 NOTE — TELEPHONE ENCOUNTER
Forms/Letter Request    Type of form/letter:  refill Auth Form for Clobetasol Topical solution not creme    Have you been seen for this request: N/A    Do we have the form/letter: Yes: given to JOSEPH Desai    Who is the form from?  Pharmacy    Where did/will the form come from? form was faxed in    When is form/letter needed by: asap    How would you like the form/letter returned: Fax : [phone]

## 2023-11-13 NOTE — TELEPHONE ENCOUNTER
Faxed completed form to [facility]  [phone].  Sent to [facility]  And filed in [facility].

## 2023-11-14 ENCOUNTER — OFFICE VISIT (OUTPATIENT)
Dept: FAMILY MEDICINE | Facility: CLINIC | Age: 68
End: 2023-11-14
Payer: COMMERCIAL

## 2023-11-14 VITALS
HEART RATE: 83 BPM | TEMPERATURE: 98.6 F | SYSTOLIC BLOOD PRESSURE: 108 MMHG | OXYGEN SATURATION: 95 % | RESPIRATION RATE: 16 BRPM | DIASTOLIC BLOOD PRESSURE: 70 MMHG | BODY MASS INDEX: 34.27 KG/M2 | HEIGHT: 61 IN | WEIGHT: 181.5 LBS

## 2023-11-14 DIAGNOSIS — L30.9 ECZEMA, UNSPECIFIED TYPE: ICD-10-CM

## 2023-11-14 DIAGNOSIS — Z12.11 ENCOUNTER FOR SCREENING COLONOSCOPY: ICD-10-CM

## 2023-11-14 DIAGNOSIS — Z00.00 ENCOUNTER FOR MEDICARE ANNUAL WELLNESS EXAM: Primary | ICD-10-CM

## 2023-11-14 DIAGNOSIS — Z13.220 SCREENING FOR HYPERLIPIDEMIA: ICD-10-CM

## 2023-11-14 DIAGNOSIS — F41.9 ANXIETY: ICD-10-CM

## 2023-11-14 DIAGNOSIS — L98.9 SKIN LESION: ICD-10-CM

## 2023-11-14 DIAGNOSIS — Z13.1 SCREENING FOR DIABETES MELLITUS: ICD-10-CM

## 2023-11-14 PROCEDURE — 90662 IIV NO PRSV INCREASED AG IM: CPT | Performed by: FAMILY MEDICINE

## 2023-11-14 PROCEDURE — G0439 PPPS, SUBSEQ VISIT: HCPCS | Performed by: FAMILY MEDICINE

## 2023-11-14 PROCEDURE — G0008 ADMIN INFLUENZA VIRUS VAC: HCPCS | Performed by: FAMILY MEDICINE

## 2023-11-14 PROCEDURE — 99213 OFFICE O/P EST LOW 20 MIN: CPT | Mod: 25 | Performed by: FAMILY MEDICINE

## 2023-11-14 PROCEDURE — 90480 ADMN SARSCOV2 VAC 1/ONLY CMP: CPT | Performed by: FAMILY MEDICINE

## 2023-11-14 PROCEDURE — 91320 SARSCV2 VAC 30MCG TRS-SUC IM: CPT | Performed by: FAMILY MEDICINE

## 2023-11-14 RX ORDER — CLOBETASOL PROPIONATE 0.5 MG/ML
SOLUTION TOPICAL 2 TIMES DAILY
COMMUNITY
Start: 2023-11-13 | End: 2024-09-17

## 2023-11-14 RX ORDER — HYDROXYZINE HYDROCHLORIDE 10 MG/1
10-30 TABLET, FILM COATED ORAL
Qty: 90 TABLET | Refills: 0 | Status: SHIPPED | OUTPATIENT
Start: 2023-11-14 | End: 2024-05-09

## 2023-11-14 RX ORDER — LORAZEPAM 0.5 MG/1
TABLET ORAL
Qty: 30 TABLET | Refills: 0 | Status: SHIPPED | OUTPATIENT
Start: 2023-11-14 | End: 2024-05-09

## 2023-11-14 RX ORDER — FOLIC ACID 1 MG/1
1 TABLET ORAL DAILY
COMMUNITY
Start: 2023-07-17

## 2023-11-14 RX ORDER — LORAZEPAM 0.5 MG/1
TABLET ORAL
Qty: 30 TABLET | Refills: 0 | Status: CANCELLED | OUTPATIENT
Start: 2023-11-14

## 2023-11-14 RX ORDER — RESPIRATORY SYNCYTIAL VIRUS VACCINE 120MCG/0.5
0.5 KIT INTRAMUSCULAR ONCE
Qty: 1 EACH | Refills: 0 | Status: CANCELLED | OUTPATIENT
Start: 2023-11-14 | End: 2023-11-14

## 2023-11-14 ASSESSMENT — ENCOUNTER SYMPTOMS
HEADACHES: 0
COUGH: 0
SHORTNESS OF BREATH: 0
FEVER: 0
EYE PAIN: 0
JOINT SWELLING: 0
FREQUENCY: 1
HEMATOCHEZIA: 0
NERVOUS/ANXIOUS: 1
HEARTBURN: 1
PARESTHESIAS: 0
DIARRHEA: 0
NAUSEA: 0
SORE THROAT: 0
BREAST MASS: 0
HEMATURIA: 0
PALPITATIONS: 0
CHILLS: 0
DIZZINESS: 0
ABDOMINAL PAIN: 0
CONSTIPATION: 0
MYALGIAS: 0
DYSURIA: 0
WEAKNESS: 0
ARTHRALGIAS: 0

## 2023-11-14 ASSESSMENT — ACTIVITIES OF DAILY LIVING (ADL): CURRENT_FUNCTION: NO ASSISTANCE NEEDED

## 2023-11-14 ASSESSMENT — PATIENT HEALTH QUESTIONNAIRE - PHQ9
10. IF YOU CHECKED OFF ANY PROBLEMS, HOW DIFFICULT HAVE THESE PROBLEMS MADE IT FOR YOU TO DO YOUR WORK, TAKE CARE OF THINGS AT HOME, OR GET ALONG WITH OTHER PEOPLE: SOMEWHAT DIFFICULT
SUM OF ALL RESPONSES TO PHQ QUESTIONS 1-9: 12

## 2023-11-14 ASSESSMENT — PAIN SCALES - GENERAL: PAINLEVEL: NO PAIN (0)

## 2023-11-14 NOTE — PROGRESS NOTES
"SUBJECTIVE:   Austin is a 68 year old who presents for Preventive Visit.        11/14/2023     1:55 PM   Additional Questions   Roomed by ANDRIY Brink   Accompanied by Self     Are you in the first 12 months of your Medicare coverage?  No    Anxiety  Associated symptoms include a rash. Pertinent negatives include no abdominal pain, arthralgias, chest pain, chills, congestion, coughing, fever, headaches, joint swelling, myalgias, nausea, sore throat or weakness.   Musculoskeletal Problem  Associated symptoms include a rash. Pertinent negatives include no abdominal pain, arthralgias, chest pain, chills, congestion, coughing, fever, headaches, joint swelling, myalgias, nausea, sore throat or weakness.   Healthy Habits:     In general, how would you rate your overall health?  Fair    Frequency of exercise:  1 day/week    Duration of exercise:  Less than 15 minutes    Do you usually eat at least 4 servings of fruit and vegetables a day, include whole grains    & fiber and avoid regularly eating high fat or \"junk\" foods?  Yes    Taking medications regularly:  Yes    Medication side effects:  None    Ability to successfully perform activities of daily living:  No assistance needed    Home Safety:  No safety concerns identified    Hearing Impairment:  Difficulty following a conversation in a noisy restaurant or crowded room and difficulty understanding soft or whispered speech    In the past 6 months, have you been bothered by leaking of urine?  No    In general, how would you rate your overall mental or emotional health?  Fair    Additional concerns today:  No      Today's PHQ-9 Score:         9/26/2022     1:25 PM 11/8/2022    12:49 PM 5/1/2023    11:33 AM   ELEAZAR-7 SCORE   Total Score 5 (mild anxiety) 8 (mild anxiety) 7 (mild anxiety)   Total Score 5 8 7         5/1/2023    11:35 AM 6/5/2023    12:49 PM 11/14/2023     1:51 PM   PHQ   PHQ-9 Total Score 15 7 12   Q9: Thoughts of better off dead/self-harm past 2 " weeks Not at all Not at all Several days   F/U: Thoughts of suicide or self-harm   No   F/U: Safety concerns   No     Have you ever done Advance Care Planning? (For example, a Health Directive, POLST, or a discussion with a medical provider or your loved ones about your wishes): Yes, advance care planning is on file.     Fall risk  Fallen 2 or more times in the past year?: No  Any fall with injury in the past year?: No    Cognitive Screening   1) Repeat 3 items (Leader, Season, Table)    2) Clock draw: NORMAL  3) 3 item recall: Recalls 2 objects   Results: NORMAL clock, 1-2 items recalled: COGNITIVE IMPAIRMENT LESS LIKELY    Mini-CogTM Copyright S Jaclyn. Licensed by the author for use in [facility]; reprinted with permission (deb@[facility]). All rights reserved.      Do you have sleep apnea, excessive snoring or daytime drowsiness? : no    Reviewed and updated as needed this visit by clinical staff   Tobacco  Allergies  Meds              Reviewed and updated as needed this visit by Provider                 Social History     Tobacco Use    Smoking status: Former     Packs/day: 1.00     Years: 5.00     Additional pack years: 0.00     Total pack years: 5.00     Types: Cigarettes     Start date: 1969     Quit date: 3/20/1974     Years since quittin.6    Smokeless tobacco: Never   Substance Use Topics    Alcohol use: Yes     Comment: About 3-4 a silas             2023     2:02 AM   Alcohol Use   Prescreen: >3 drinks/day or >7 drinks/week? No     Do you have a current opioid prescription? No  Do you use any other controlled substances or medications that are not prescribed by a provider? None    Current providers sharing in care for this patient include:   Patient Care Team:  Dunia Desai APRN CNP as PCP - General (Nurse Practitioner - Family)  Dunia Desai APRN CNP as Assigned PCP  Sally Patrick PA-C as Physician Assistant (Dermatology)  Ryann Almanzar APRN CNP as  Referring Physician (Nurse Practitioner - Family)    The following health maintenance items are reviewed in Epic and correct as of today:  Health Maintenance   Topic Date Due    ZOSTER IMMUNIZATION (1 of 2) Never done    RSV VACCINE (Pregnancy & 60+) (1 - 1-dose 60+ series) Never done    ASTHMA ACTION PLAN  10/03/2020    INFLUENZA VACCINE (1) 09/01/2023    COVID-19 Vaccine (3 - 2023-24 season) 09/01/2023    ANNUAL REVIEW OF HM ORDERS  11/08/2023    MEDICARE ANNUAL WELLNESS VISIT  11/08/2023    ASTHMA CONTROL TEST  12/05/2023    PHQ-9  05/14/2024    FALL RISK ASSESSMENT  11/14/2024    MAMMO SCREENING  06/13/2025    DTAP/TDAP/TD IMMUNIZATION (2 - Td or Tdap) 09/15/2026    LIPID  11/08/2027    ADVANCE CARE PLANNING  11/08/2027    COLORECTAL CANCER SCREENING  08/16/2029    DEXA  10/18/2034    HEPATITIS C SCREENING  Completed    DEPRESSION ACTION PLAN  Completed    Pneumococcal Vaccine: 65+ Years  Completed    IPV IMMUNIZATION  Aged Out    HPV IMMUNIZATION  Aged Out    MENINGITIS IMMUNIZATION  Aged Out    RSV MONOCLONAL ANTIBODY  Aged Out         Review of Systems   Constitutional:  Negative for chills and fever.   HENT:  Negative for congestion, ear pain, hearing loss and sore throat.    Eyes:  Positive for visual disturbance. Negative for pain.   Respiratory:  Negative for cough and shortness of breath.    Cardiovascular:  Negative for chest pain, palpitations and peripheral edema.   Gastrointestinal:  Positive for heartburn. Negative for abdominal pain, constipation, diarrhea, hematochezia and nausea.   Breasts:  Negative for tenderness, breast mass and discharge.   Genitourinary:  Positive for frequency. Negative for dysuria, genital sores, hematuria, pelvic pain, urgency, vaginal bleeding and vaginal discharge.   Musculoskeletal:  Negative for arthralgias, joint swelling and myalgias.   Skin:  Positive for rash.   Neurological:  Negative for dizziness, weakness, headaches and paresthesias.   Psychiatric/Behavioral:   "Negative for mood changes. The patient is nervous/anxious.        OBJECTIVE:   /70 (BP Location: Right arm, Patient Position: Sitting, Cuff Size: Adult Regular)   Pulse 83   Temp 98.6  F (37  C) (Oral)   Resp 16   Ht 1.549 m (5' 1\")   Wt 82.3 kg (181 lb 8 oz)   LMP  (LMP Unknown)   SpO2 95%   Breastfeeding No   BMI 34.29 kg/m   Estimated body mass index is 34.29 kg/m  as calculated from the following:    Height as of this encounter: 1.549 m (5' 1\").    Weight as of this encounter: 82.3 kg (181 lb 8 oz).  Physical Exam  GENERAL: healthy, alert and no distress  EYES: Eyes grossly normal to inspection  HENT: ear canals and TM's normal, nose and mouth without ulcers or lesions  NECK: no adenopathy and no asymmetry, masses, or scars  RESP: lungs clear to auscultation - no rales, rhonchi or wheezes  CV: regular rates and rhythm, normal S1 S2, no S3 or S4, and no murmur, click or rub  MS: no gross musculoskeletal defects noted, no edema  PSYCH: mentation appears normal, affect normal/bright        ASSESSMENT / PLAN:   1. Encounter for Medicare annual wellness exam  Health maintenance reviewed and updated. Emphasized importance of balanced diet and regular exercise.      2. Anxiety  Stable, continue venafaxine, use lorazepam sparingly.  - LORazepam (ATIVAN) 0.5 MG tablet; TAKE 1/2 TO 1 (ONE-HALF TO ONE) TABLET BY MOUTH EVERY 8 HOURS AS NEEDED FOR ANXIETY  Dispense: 30 tablet; Refill: 0    3. Eczema, unspecified type  - hydrOXYzine (ATARAX) 10 MG tablet; Take 1-3 tablets (10-30 mg) by mouth nightly as needed for itching  Dispense: 90 tablet; Refill: 0    4. Screening for diabetes mellitus  - Basic metabolic panel  (Ca, Cl, CO2, Creat, Gluc, K, Na, BUN); Future    5. Screening for hyperlipidemia  - Lipid panel reflex to direct LDL Fasting; Future    6. Encounter for screening colonoscopy  - Colonoscopy Screening  Referral; Future    7. Skin lesion  - Adult Dermatology  Referral; " "Future      Patient has been advised of split billing requirements and indicates understanding: Yes    Depression Screening Follow Up        11/14/2023     1:51 PM   PHQ   PHQ-9 Total Score 12   Q9: Thoughts of better off dead/self-harm past 2 weeks Several days   F/U: Thoughts of suicide or self-harm No   F/U: Safety concerns No             COUNSELING:  Reviewed preventive health counseling, as reflected in patient instructions      BMI:   Estimated body mass index is 34.29 kg/m  as calculated from the following:    Height as of this encounter: 1.549 m (5' 1\").    Weight as of this encounter: 82.3 kg (181 lb 8 oz).         She reports that she quit smoking about 49 years ago. Her smoking use included cigarettes. She started smoking about 54 years ago. She has a 5.00 pack-year smoking history. She has never used smokeless tobacco.      Appropriate preventive services were discussed with this patient, including applicable screening as appropriate for fall prevention, nutrition, physical activity, Tobacco-use cessation, weight loss and cognition.  Checklist reviewing preventive services available has been given to the patient.    Reviewed patients plan of care and provided an AVS. The Basic Care Plan (routine screening as documented in Health Maintenance) for Austin meets the Care Plan requirement. This Care Plan has been established and reviewed with the Patient.        Manuel Mancilla [facility] [place]    Identified Health Risks:  I have reviewed Opioid Use Disorder and Substance Use Disorder risk factors and made any needed referrals.   Answers submitted by the patient for this visit:  Patient Health Questionnaire (Submitted on 11/14/2023)  If you checked off any problems, how difficult have these problems made it for you to do your work, take care of things at home, or get along with other people?: Somewhat difficult  PHQ9 TOTAL SCORE: 12  Annual Preventive Visit (Submitted on " "11/8/2023)  Chief Complaint: Annual Exam:  In general, how would you rate your overall physical health?: fair  Frequency of exercise:: 1 day/week  Do you usually eat at least 4 servings of fruit and vegetables a day, include whole grains & fiber, and avoid regularly eating high fat or \"junk\" foods? : Yes  Taking medications regularly:: Yes  Medication side effects:: None  Activities of Daily Living: no assistance needed  Home safety: no safety concerns identified  Hearing Impairment:: difficulty following a conversation in a noisy restaurant or crowded room, difficulty understanding soft or whispered speech  In the past 6 months, have you been bothered by leaking of urine?: No  abdominal pain: No  Blood in stool: No  Blood in urine: No  chest pain: No  chills: No  congestion: No  constipation: No  cough: No  diarrhea: No  dizziness: No  ear pain: No  eye pain: No  nervous/anxious: Yes  fever: No  frequency: Yes  genital sores: No  headaches: No  hearing loss: No  heartburn: Yes  arthralgias: No  joint swelling: No  peripheral edema: No  mood changes: No  myalgias: No  nausea: No  dysuria: No  palpitations: No  Skin sensation changes: No  sore throat: No  urgency: No  rash: Yes  shortness of breath: No  visual disturbance: Yes  weakness: No  pelvic pain: No  vaginal bleeding: No  vaginal discharge: No  tenderness: No  breast mass: No  breast discharge: No  In general, how would you rate your overall mental or emotional health?: fair  Additional concerns today:: No  Exercise outside of work (Submitted on 11/8/2023)  Chief Complaint: Annual Exam:  Duration of exercise:: Less than 15 minutes    "

## 2023-11-14 NOTE — PROGRESS NOTES
"The patient was provided with suggestions to help her develop a healthy physical lifestyle.  She is at risk for lack of exercise and has been provided with information to increase physical activity for the benefit of her well-being.  The patient was provided with written information regarding signs of hearing loss.  The patient was provided with suggestions to help her develop a healthy emotional lifestyle.  The patient s PHQ-9 score is consistent with moderate depression. She was provided with information regarding depression and was advised to schedule a follow up appointment in  weeks to further address this issue.  Answers submitted by the patient for this visit:  Patient Health Questionnaire (Submitted on 11/14/2023)  If you checked off any problems, how difficult have these problems made it for you to do your work, take care of things at home, or get along with other people?: Somewhat difficult  PHQ9 TOTAL SCORE: 12  Annual Preventive Visit (Submitted on 11/8/2023)  Chief Complaint: Annual Exam:  In general, how would you rate your overall physical health?: fair  Frequency of exercise:: 1 day/week  Do you usually eat at least 4 servings of fruit and vegetables a day, include whole grains & fiber, and avoid regularly eating high fat or \"junk\" foods? : Yes  Taking medications regularly:: Yes  Medication side effects:: None  Activities of Daily Living: no assistance needed  Home safety: no safety concerns identified  Hearing Impairment:: difficulty following a conversation in a noisy restaurant or crowded room, difficulty understanding soft or whispered speech  In the past 6 months, have you been bothered by leaking of urine?: No  abdominal pain: No  Blood in stool: No  Blood in urine: No  chest pain: No  chills: No  congestion: No  constipation: No  cough: No  diarrhea: No  dizziness: No  ear pain: No  eye pain: No  nervous/anxious: Yes  fever: No  frequency: Yes  genital sores: No  headaches: No  hearing loss: " No  heartburn: Yes  arthralgias: No  joint swelling: No  peripheral edema: No  mood changes: No  myalgias: No  nausea: No  dysuria: No  palpitations: No  Skin sensation changes: No  sore throat: No  urgency: No  rash: Yes  shortness of breath: No  visual disturbance: Yes  weakness: No  pelvic pain: No  vaginal bleeding: No  vaginal discharge: No  tenderness: No  breast mass: No  breast discharge: No  In general, how would you rate your overall mental or emotional health?: fair  Additional concerns today:: No  Exercise outside of work (Submitted on 11/8/2023)  Chief Complaint: Annual Exam:  Duration of exercise:: Less than 15 minutes

## 2023-11-14 NOTE — PATIENT INSTRUCTIONS
Patient Education   Personalized Prevention Plan  You are due for the preventive services outlined below.  Your care team is available to assist you in scheduling these services.  If you have already completed any of these items, please share that information with your care team to update in your medical record.  Health Maintenance Due   Topic Date Due    Zoster (Shingles) Vaccine (1 of 2) Never done    RSV VACCINE (Pregnancy & 60+) (1 - 1-dose 60+ series) Never done    Asthma Action Plan - yearly  10/03/2020    Flu Vaccine (1) 09/01/2023    COVID-19 Vaccine (3 - 2023-24 season) 09/01/2023    ANNUAL REVIEW OF HM ORDERS  11/08/2023    Annual Wellness Visit  11/08/2023    Asthma Control Test  12/05/2023     Your Health Risk Assessment indicates you feel you are not in good health    A healthy lifestyle helps keep the body fit and the mind alert. It helps protect you from disease, helps you fight disease, and helps prevent chronic disease (disease that doesn't go away) from getting worse. This is important as you get older and begin to notice twinges in muscles and joints and a decline in the strength and stamina you once took for granted. A healthy lifestyle includes good healthcare, good nutrition, weight control, recreation, and regular exercise. Avoid harmful substances and do what you can to keep safe. Another part of a healthy lifestyle is stay mentally active and socially involved.    Good healthcare   Have a wellness visit every year.   If you have new symptoms, let us know right away. Don't wait until the next checkup.   Take medicines exactly as prescribed and keep your medicines in a safe place. Tell us if your medicine causes problems.   Healthy diet and weight control   Eat 3 or 4 small, nutritious, low-fat, high-fiber meals a day. Include a variety of fruits, vegetables, and whole-grain foods.   Make sure you get enough calcium in your diet. Calcium, vitamin D, and exercise help prevent osteoporosis (bone  "thinning).   If you live alone, try eating with others when you can. That way you get a good meal and have company while you eat it.   Try to keep a healthy weight. If you eat more calories than your body uses for energy, it will be stored as fat and you will gain weight.     Recreation   Recreation is not limited to sports and team events. It includes any activity that provides relaxation, interest, enjoyment, and exercise. Recreation provides an outlet for physical, mental, and social energy. It can give a sense of worth and achievement. It can help you stay healthy.    Mental Exercise and Social Involvement  Mental and emotional health is as important as physical health. Keep in touch with friends and family. Stay as active as possible. Continue to learn and challenge yourself.   Things you can do to stay mentally active are:  Learn something new, like a foreign language or musical instrument.   Play SCRABBLE or do crossword puzzles. If you cannot find people to play these games with you at home, you can play them with others on your computer through the Internet.   Join a games club--anything from card games to chess or checkers or lawn bowling.   Start a new hobby.   Go back to school.   Volunteer.   Read.   Keep up with world events.  Learning About Being Physically Active  What is physical activity?     Being physically active means doing any kind of activity that gets your body moving.  The types of physical activity that can help you get fit and stay healthy include:  Aerobic or \"cardio\" activities. These make your heart beat faster and make you breathe harder, such as brisk walking, riding a bike, or running. They strengthen your heart and lungs and build up your endurance.  Strength training activities. These make your muscles work against, or \"resist,\" something. Examples include lifting weights or doing push-ups. These activities help tone and strengthen your muscles and bones.  Stretches. These let you " move your joints and muscles through their full range of motion. Stretching helps you be more flexible.  Reaching a balance between these three types of physical activity is important because each one contributes to your overall fitness.  What are the benefits of being active?  Being active is one of the best things you can do for your health. It helps you to:  Feel stronger and have more energy to do all the things you like to do.  Focus better at school or work.  Feel, think, and sleep better.  Reach and stay at a healthy weight.  Lose fat and build lean muscle.  Lower your risk for serious health problems, including diabetes, heart attack, high blood pressure, and some cancers.  Keep your heart, lungs, bones, muscles, and joints strong and healthy.  How can you make being active part of your life?  Start slowly. Make it your long-term goal to get at least 30 minutes of exercise on most days of the week. Walking is a good choice. You also may want to do other activities, such as running, swimming, cycling, or playing tennis or team sports.  Pick activities that you like--ones that make your heart beat faster, your muscles stronger, and your muscles and joints more flexible. If you find more than one thing you like doing, do them all. You don't have to do the same thing every day.  Get your heart pumping every day. Any activity that makes your heart beat faster and keeps it at that rate for a while counts.  Here are some great ways to get your heart beating faster:  Go for a brisk walk, run, or hike.  Go for a swim or bike ride.  Take an online exercise class or dance.  Play a game of touch football, basketball, or soccer.  Play tennis, pickleball, or racquetball.  Climb stairs.  Even some household chores can be aerobic. Just do them at a faster pace. Raking or mowing the lawn, sweeping the garage, and vacuuming and cleaning your home all can help get your heart rate up.  Strengthen your muscles during the week.  "You don't have to lift heavy weights or grow big, bulky muscles to get stronger. Doing a few simple activities that make your muscles work against, or \"resist,\" something can help you get stronger. Aim for at least twice a week.  For example, you can:  Do push-ups or sit-ups, which use your own body weight as resistance.  Lift weights or dumbbells or use stretch bands at home or in a gym or community center.  Stretch your muscles often. Stretching will help you as you become more active. It can help you stay flexible and loosen tight muscles. It can also help improve your balance and posture and can be a great way to relax.  Be sure to stretch the muscles you'll be using when you work out. It's best to warm your muscles slightly before you stretch them. Walk or do some other light aerobic activity for a few minutes. Then start stretching.  When you stretch your muscles:  Do it slowly. Stretching is not about going fast or making sudden movements.  Don't push or bounce during a stretch.  Hold each stretch for at least 15 to 30 seconds, if you can. You should feel a stretch in the muscle, but not pain.  Breathe out as you do the stretch. Then breathe in as you hold the stretch. Don't hold your breath.  If you're worried about how more activity might affect your health, have a checkup before you start. Follow any special advice your doctor gives you for getting a smart start.  Where can you learn more?  Go to https://www.[employer]ocean.net/patiented  Enter W332 in the search box to learn more about \"Learning About Being Physically Active.\"  Current as of: June 6, 2023               Content Version: 13.8    9981-5491 [employer].   Care instructions adapted under license by your healthcare professional. If you have questions about a medical condition or this instruction, always ask your healthcare professional. [employer] disclaims any warranty or liability for your use of this " information.      Hearing Loss: Care Instructions  Overview     Hearing loss is a sudden or slow decrease in how well you hear. It can range from slight to profound. Permanent hearing loss can occur with aging. It also can happen when you are exposed long-term to loud noise. Examples include listening to loud music, riding motorcycles, or being around other loud machines.  Hearing loss can affect your work and home life. It can make you feel lonely or depressed. You may feel that you have lost your independence. But hearing aids and other devices can help you hear better and feel connected to others.  Follow-up care is a key part of your treatment and safety. Be sure to make and go to all appointments, and call your doctor if you are having problems. It's also a good idea to know your test results and keep a list of the medicines you take.  How can you care for yourself at home?  Avoid loud noises whenever possible. This helps keep your hearing from getting worse.  Always wear hearing protection around loud noises.  Wear a hearing aid as directed.  A professional can help you pick a hearing aid that will work best for you.  You can also get hearing aids over the counter for mild to moderate hearing loss.  Have hearing tests as your doctor suggests. They can show whether your hearing has changed. Your hearing aid may need to be adjusted.  Use other devices as needed. These may include:  Telephone amplifiers and hearing aids that can connect to a television, stereo, radio, or microphone.  Devices that use lights or vibrations. These alert you to the doorbell, a ringing telephone, or a baby monitor.  Television closed-captioning. This shows the words at the bottom of the screen. Most new TVs can do this.  TTY (text telephone). This lets you type messages back and forth on the telephone instead of talking or listening. These devices are also called TDD. When messages are typed on the keyboard, they are sent over the  "phone line to a receiving TTY. The message is shown on a monitor.  Use text messaging, social media, and email if it is hard for you to communicate by telephone.  Try to learn a listening technique called speechreading. It is not lipreading. You pay attention to people's gestures, expressions, posture, and tone of voice. These clues can help you understand what a person is saying. Face the person you are talking to, and have them face you. Make sure the lighting is good. You need to see the other person's face clearly.  Think about counseling if you need help to adjust to your hearing loss.  When should you call for help?  Watch closely for changes in your health, and be sure to contact your doctor if:    You think your hearing is getting worse.     You have new symptoms, such as dizziness or nausea.   Where can you learn more?  Go to https://www.[employer].Phoneplus/patiented  Enter R798 in the search box to learn more about \"Hearing Loss: Care Instructions.\"  Current as of: February 28, 2023               Content Version: 13.8    6311-3340 [employer].   Care instructions adapted under license by your healthcare professional. If you have questions about a medical condition or this instruction, always ask your healthcare professional. [employer] disclaims any warranty or liability for your use of this information.      Your Health Risk Assessment indicates you feel you are not in good emotional health.    Recreation   Recreation is not limited to sports and team events. It includes any activity that provides relaxation, interest, enjoyment, and exercise. Recreation provides an outlet for physical, mental, and social energy. It can give a sense of worth and achievement. It can help you stay healthy.    Mental Exercise and Social Involvement  Mental and emotional health is as important as physical health. Keep in touch with friends and family. Stay as active as possible. Continue to learn and " challenge yourself.   Things you can do to stay mentally active are:  Learn something new, like a foreign language or musical instrument.   Play SCRABBLE or do crossword puzzles. If you cannot find people to play these games with you at home, you can play them with others on your computer through the Internet.   Join a games club--anything from card games to chess or checkers or lawn bowling.   Start a new hobby.   Go back to school.   Volunteer.   Read.   Keep up with world events.  Learning About Depression Screening  What is depression screening?  Depression screening is a way to see if you have depression symptoms. It may be done by a doctor or counselor. It's often part of a routine checkup. That's because your mental health is just as important as your physical health.  Depression is a mental health condition that affects how you feel, think, and act. You may:  Have less energy.  Lose interest in your daily activities.  Feel sad and grouchy for a long time.  Depression is very common. It affects people of all ages.  Many things can lead to depression. Some people become depressed after they have a stroke or find out they have a major illness like cancer or heart disease. The death of a loved one or a breakup may lead to depression. It can run in families. Most experts believe that a combination of inherited genes and stressful life events can cause it.  What happens during screening?  You may be asked to fill out a form about your depression symptoms. You and the doctor will discuss your answers. The doctor may ask you more questions to learn more about how you think, act, and feel.  What happens after screening?  If you have symptoms of depression, your doctor will talk to you about your options.  Doctors usually treat depression with medicines or counseling. Often, combining the two works best. Many people don't get help because they think that they'll get over the depression on their own. But people with  "depression may not get better unless they get treatment.  The cause of depression is not well understood. There may be many factors involved. But if you have depression, it's not your fault.  A serious symptom of depression is thinking about death or suicide. If you or someone you care about talks about this or about feeling hopeless, get help right away.  It's important to know that depression can be treated. Medicine, counseling, and self-care may help.  Where can you learn more?  Go to https://www.[employer].net/patiented  Enter T185 in the search box to learn more about \"Learning About Depression Screening.\"  Current as of: June 25, 2023               Content Version: 13.8    1066-0412 [employer].   Care instructions adapted under license by your healthcare professional. If you have questions about a medical condition or this instruction, always ask your healthcare professional. [employer] disclaims any warranty or liability for your use of this information.          Recommend getting shingles (Shingrix) and RSV (Abrysvo) vaccines. These should be done at the pharmacy for Medicare to cover them.           "

## 2023-11-17 ENCOUNTER — LAB (OUTPATIENT)
Dept: LAB | Facility: CLINIC | Age: 68
End: 2023-11-17
Payer: COMMERCIAL

## 2023-11-17 DIAGNOSIS — Z13.220 SCREENING FOR HYPERLIPIDEMIA: ICD-10-CM

## 2023-11-17 DIAGNOSIS — Z13.1 SCREENING FOR DIABETES MELLITUS: ICD-10-CM

## 2023-11-17 LAB
ANION GAP SERPL CALCULATED.3IONS-SCNC: 9 MMOL/L (ref 7–15)
BUN SERPL-MCNC: 12.3 MG/DL (ref 8–23)
CALCIUM SERPL-MCNC: 9.6 MG/DL (ref 8.8–10.2)
CHLORIDE SERPL-SCNC: 102 MMOL/L (ref 98–107)
CHOLEST SERPL-MCNC: 224 MG/DL
CREAT SERPL-MCNC: 0.8 MG/DL (ref 0.51–0.95)
DEPRECATED HCO3 PLAS-SCNC: 27 MMOL/L (ref 22–29)
EGFRCR SERPLBLD CKD-EPI 2021: 79 ML/MIN/1.73M2
GLUCOSE SERPL-MCNC: 88 MG/DL (ref 70–99)
HDLC SERPL-MCNC: 55 MG/DL
LDLC SERPL CALC-MCNC: 153 MG/DL
NONHDLC SERPL-MCNC: 169 MG/DL
POTASSIUM SERPL-SCNC: 5 MMOL/L (ref 3.4–5.3)
SODIUM SERPL-SCNC: 138 MMOL/L (ref 135–145)
TRIGL SERPL-MCNC: 82 MG/DL

## 2023-11-17 PROCEDURE — 80061 LIPID PANEL: CPT

## 2023-11-17 PROCEDURE — 80048 BASIC METABOLIC PNL TOTAL CA: CPT

## 2023-11-17 PROCEDURE — 36415 COLL VENOUS BLD VENIPUNCTURE: CPT

## 2024-02-26 ENCOUNTER — OFFICE VISIT (OUTPATIENT)
Dept: DERMATOLOGY | Facility: CLINIC | Age: 69
End: 2024-02-26
Payer: COMMERCIAL

## 2024-02-26 DIAGNOSIS — D69.2 SOLAR PURPURA (H): Primary | ICD-10-CM

## 2024-02-26 DIAGNOSIS — L57.8 ACTINIC SKIN DAMAGE: ICD-10-CM

## 2024-02-26 DIAGNOSIS — L81.4 LENTIGINES: ICD-10-CM

## 2024-02-26 PROCEDURE — 99213 OFFICE O/P EST LOW 20 MIN: CPT | Performed by: STUDENT IN AN ORGANIZED HEALTH CARE EDUCATION/TRAINING PROGRAM

## 2024-02-26 NOTE — LETTER
2/26/2024         RE: Austin Shaffer  [id number] [address] [id number]        Dear Colleague,    Thank you for referring your patient, Austin Shaffer, to the [facility]. Please see a copy of my visit note below.    [facility] Dermatology Note    Encounter Date: Feb 26, 2024    Dermatology Problem List:  -  ______________________________________    Impression/Plan:  Austin was seen today for derm problem.    Diagnoses and all orders for this visit:    Solar purpura (H24)  - benign    Actinic skin damage  Lentigines  - Reviewed the compounding benefits of incremental changes to sun protective clothing behaviors including increased frequency of sunscreen and sun protective clothing like broad brimmed hats and longsleeved UPF containing clothing      Follow-up PRN .       Staff Involved:  Staff Only    Per Kern MD   of Dermatology  Department of Dermatology  [facility]      CC:   Chief Complaint   Patient presents with     Derm Problem     Lesion in the middle of the chest  Lesion on the upper lip        History of Present Illness:  Ms. Austin Shaffer is a 68 year old female who presents as a new patient.    Presents today for concerns about easy bruising on her bilateral forearms.  She does not have any bleeding from her gums or other mucosal sites.    She originally made this appointment when she had a stuck on spot on her left temple that detached on its own.  There is no residual lesion or induration in the area where it was    Labs:      Physical exam:  Vitals: LMP  (LMP Unknown)   GEN: well developed, well-nourished, in no acute distress, in a pleasant mood.     SKIN: Mims phototype 1  - Sun-exposed skin, which includes the head/face, neck, both arms, digits, and/or nails was examined.   - Flat brown macules and patches in a sun exposed areas on face and  extremities  -Nonblanching purpuric macules on bilateral forearms  - No other lesions of concern on areas examined.     Past Medical History:   Past Medical History:   Diagnosis Date     Depressive disorder      Mild intermittent asthma      Nephritis     age 9     Past Surgical History:   Procedure Laterality Date     ABDOMEN SURGERY      ? Enteritis as a child, possibly appendix     APPENDECTOMY       BIOPSY  10/05/21     COLONOSCOPY      Cant remember the date     GENITOURINARY SURGERY      Cant remember the date     HYSTERECTOMY TOTAL ABDOMINAL, BILATERAL SALPINGO-OOPHORECTOMY, COMBINED  1998     OPEN REDUCTION INTERNAL FIXATION WRIST Left 03/31/2021    Left distal radius ORIF with Synthes volar plate (narrow plate with 3 proximal screw hole plate), Dr. Fahad Longoria, [facility]     TONSILLECTOMY         Social History:   reports that she quit smoking about 49 years ago. Her smoking use included cigarettes. She started smoking about 54 years ago. She has a 5 pack-year smoking history. She has never used smokeless tobacco. She reports current alcohol use. She reports that she does not use drugs.    Family History:  Family History   Problem Relation Age of Onset     Cardiovascular Mother      Hypertension Mother      Depression Mother      Cardiovascular Maternal Grandmother      Cancer Father         Kidney     Other Cancer Father      Myocardial Infarction Brother 60        2017     Cerebrovascular Disease Brother 63        2017     Mental Illness Brother      Diabetes Brother      Myocardial Infarction Brother      Hypertension Brother         Had bad stoke 2015     Cerebrovascular Disease Brother      Asthma Brother      Diabetes Brother         Passed away at 60 Heart attack     Hypertension Brother      Other Cancer Maternal Grandfather        Medications:  Current Outpatient Medications   Medication Sig Dispense Refill     albuterol (PROAIR HFA) 108 (90 Base) MCG/ACT inhaler Inhale 2 puffs into the  lungs every 6 hours as needed for shortness of breath / dyspnea 2 Inhaler 1     calcium carbonate (OS-ZACH) 500 MG tablet Take 1 tablet (500 mg) by mouth 2 times daily 180 tablet 1     cephALEXin (KEFLEX) 500 MG capsule Take 1 capsule (500 mg) by mouth 2 times daily 20 capsule 0     clobetasol (TEMOVATE) 0.05 % external ointment Apply to AA BID x 1-2 weeks then PRN only 60 g 3     clobetasol (TEMOVATE) 0.05 % external solution Apply topically 2 times daily       folic acid (FOLVITE) 1 MG tablet Take 1 mg by mouth daily       gabapentin (NEURONTIN) 300 MG capsule Take 1 capsule (300 mg) by mouth nightly as needed for neuropathic pain 90 capsule 1     hydrOXYzine (ATARAX) 10 MG tablet Take 1-3 tablets (10-30 mg) by mouth nightly as needed for itching 90 tablet 0     LORazepam (ATIVAN) 0.5 MG tablet TAKE 1/2 TO 1 (ONE-HALF TO ONE) TABLET BY MOUTH EVERY 8 HOURS AS NEEDED FOR ANXIETY 30 tablet 0     Omega-3 Fish Oil 500 MG capsule Take 1 capsule (500 mg) by mouth 2 times daily 180 capsule 3     omeprazole (PRILOSEC) 20 MG DR capsule Take 1 capsule (20 mg) by mouth 2 times daily 180 capsule 3     Probiotic Product (PROBIOTIC DAILY) CAPS Take 1 capsule by mouth daily 90 capsule 3     venlafaxine (EFFEXOR XR) 150 MG 24 hr capsule Take 1 capsule by mouth once daily 90 capsule 1     Vitamin D3 (CHOLECALCIFEROL) 25 mcg (1000 units) tablet Take 1 tablet (25 mcg) by mouth daily 90 tablet 3     Allergies   Allergen Reactions     Sulfa Antibiotics      Gluten Meal      Morphine Difficulty breathing               Again, thank you for allowing me to participate in the care of your patient.        Sincerely,        Per Kern MD

## 2024-02-26 NOTE — PROGRESS NOTES
[facility] Dermatology Note    Encounter Date: Feb 26, 2024    Dermatology Problem List:  -  ______________________________________    Impression/Plan:  Austin was seen today for derm problem.    Diagnoses and all orders for this visit:    Solar purpura (H24)  - benign    Actinic skin damage  Lentigines  - Reviewed the compounding benefits of incremental changes to sun protective clothing behaviors including increased frequency of sunscreen and sun protective clothing like broad brimmed hats and longsleeved UPF containing clothing      Follow-up PRN .       Staff Involved:  Staff Only    Per Kern MD   of Dermatology  Department of Dermatology  [facility]      CC:   Chief Complaint   Patient presents with    Derm Problem     Lesion in the middle of the chest  Lesion on the upper lip        History of Present Illness:  Ms. Austin Shaffer is a 68 year old female who presents as a new patient.    Presents today for concerns about easy bruising on her bilateral forearms.  She does not have any bleeding from her gums or other mucosal sites.    She originally made this appointment when she had a stuck on spot on her left temple that detached on its own.  There is no residual lesion or induration in the area where it was    Labs:      Physical exam:  Vitals: LMP  (LMP Unknown)   GEN: well developed, well-nourished, in no acute distress, in a pleasant mood.     SKIN: Mims phototype 1  - Sun-exposed skin, which includes the head/face, neck, both arms, digits, and/or nails was examined.   - Flat brown macules and patches in a sun exposed areas on face and extremities  -Nonblanching purpuric macules on bilateral forearms  - No other lesions of concern on areas examined.     Past Medical History:   Past Medical History:   Diagnosis Date    Depressive disorder     Mild intermittent asthma     Nephritis     age 9     Past Surgical History:    Procedure Laterality Date    ABDOMEN SURGERY      ? Enteritis as a child, possibly appendix    APPENDECTOMY      BIOPSY  10/05/21    COLONOSCOPY      Cant remember the date    GENITOURINARY SURGERY      Cant remember the date    HYSTERECTOMY TOTAL ABDOMINAL, BILATERAL SALPINGO-OOPHORECTOMY, COMBINED  1998    OPEN REDUCTION INTERNAL FIXATION WRIST Left 03/31/2021    Left distal radius ORIF with Synthes volar plate (narrow plate with 3 proximal screw hole plate), Dr. Fahad Longoria, [facility]    TONSILLECTOMY         Social History:   reports that she quit smoking about 49 years ago. Her smoking use included cigarettes. She started smoking about 54 years ago. She has a 5 pack-year smoking history. She has never used smokeless tobacco. She reports current alcohol use. She reports that she does not use drugs.    Family History:  Family History   Problem Relation Age of Onset    Cardiovascular Mother     Hypertension Mother     Depression Mother     Cardiovascular Maternal Grandmother     Cancer Father         Kidney    Other Cancer Father     Myocardial Infarction Brother 60        2017    Cerebrovascular Disease Brother 63        2017    Mental Illness Brother     Diabetes Brother     Myocardial Infarction Brother     Hypertension Brother         Had bad stoke 2015    Cerebrovascular Disease Brother     Asthma Brother     Diabetes Brother         Passed away at 60 Heart attack    Hypertension Brother     Other Cancer Maternal Grandfather        Medications:  Current Outpatient Medications   Medication Sig Dispense Refill    albuterol (PROAIR HFA) 108 (90 Base) MCG/ACT inhaler Inhale 2 puffs into the lungs every 6 hours as needed for shortness of breath / dyspnea 2 Inhaler 1    calcium carbonate (OS-ZACH) 500 MG tablet Take 1 tablet (500 mg) by mouth 2 times daily 180 tablet 1    cephALEXin (KEFLEX) 500 MG capsule Take 1 capsule (500 mg) by mouth 2 times daily 20 capsule 0    clobetasol (TEMOVATE) 0.05 % external  ointment Apply to AA BID x 1-2 weeks then PRN only 60 g 3    clobetasol (TEMOVATE) 0.05 % external solution Apply topically 2 times daily      folic acid (FOLVITE) 1 MG tablet Take 1 mg by mouth daily      gabapentin (NEURONTIN) 300 MG capsule Take 1 capsule (300 mg) by mouth nightly as needed for neuropathic pain 90 capsule 1    hydrOXYzine (ATARAX) 10 MG tablet Take 1-3 tablets (10-30 mg) by mouth nightly as needed for itching 90 tablet 0    LORazepam (ATIVAN) 0.5 MG tablet TAKE 1/2 TO 1 (ONE-HALF TO ONE) TABLET BY MOUTH EVERY 8 HOURS AS NEEDED FOR ANXIETY 30 tablet 0    Omega-3 Fish Oil 500 MG capsule Take 1 capsule (500 mg) by mouth 2 times daily 180 capsule 3    omeprazole (PRILOSEC) 20 MG DR capsule Take 1 capsule (20 mg) by mouth 2 times daily 180 capsule 3    Probiotic Product (PROBIOTIC DAILY) CAPS Take 1 capsule by mouth daily 90 capsule 3    venlafaxine (EFFEXOR XR) 150 MG 24 hr capsule Take 1 capsule by mouth once daily 90 capsule 1    Vitamin D3 (CHOLECALCIFEROL) 25 mcg (1000 units) tablet Take 1 tablet (25 mcg) by mouth daily 90 tablet 3     Allergies   Allergen Reactions    Sulfa Antibiotics     Gluten Meal     Morphine Difficulty breathing

## 2024-02-27 DIAGNOSIS — F41.9 ANXIETY: ICD-10-CM

## 2024-02-28 RX ORDER — VENLAFAXINE HYDROCHLORIDE 150 MG/1
CAPSULE, EXTENDED RELEASE ORAL
Qty: 90 CAPSULE | Refills: 3 | Status: SHIPPED | OUTPATIENT
Start: 2024-02-28 | End: 2024-08-07

## 2024-05-08 ENCOUNTER — MYC MEDICAL ADVICE (OUTPATIENT)
Dept: FAMILY MEDICINE | Facility: CLINIC | Age: 69
End: 2024-05-08
Payer: COMMERCIAL

## 2024-05-08 DIAGNOSIS — L30.9 ECZEMA, UNSPECIFIED TYPE: ICD-10-CM

## 2024-05-08 NOTE — TELEPHONE ENCOUNTER
My chart message sent     Lianet Mora RN, BSN  [facility]

## 2024-05-08 NOTE — TELEPHONE ENCOUNTER
Please see my chart message below     Please review and advise     Thank you     Lianet Mora RN, BSN  [facility]

## 2024-05-09 RX ORDER — HYDROXYZINE HYDROCHLORIDE 10 MG/1
TABLET, FILM COATED ORAL
Qty: 90 TABLET | Refills: 1 | Status: SHIPPED | OUTPATIENT
Start: 2024-05-09 | End: 2024-08-07

## 2024-05-09 NOTE — TELEPHONE ENCOUNTER
Patient needs follow-up appointment for further review and discussion regarding plan and possible medication adjustment.     - Alis, CNP

## 2024-05-10 NOTE — TELEPHONE ENCOUNTER
TC- please reach out to assist in scheduling.     Mychart sent to patient.     Routing to  north and south     Thank you.

## 2024-05-11 ASSESSMENT — ASTHMA QUESTIONNAIRES
QUESTION_2 LAST FOUR WEEKS HOW OFTEN HAVE YOU HAD SHORTNESS OF BREATH: NOT AT ALL
QUESTION_3 LAST FOUR WEEKS HOW OFTEN DID YOUR ASTHMA SYMPTOMS (WHEEZING, COUGHING, SHORTNESS OF BREATH, CHEST TIGHTNESS OR PAIN) WAKE YOU UP AT NIGHT OR EARLIER THAN USUAL IN THE MORNING: NOT AT ALL
ACT_TOTALSCORE: 25
ACT_TOTALSCORE: 25
QUESTION_5 LAST FOUR WEEKS HOW WOULD YOU RATE YOUR ASTHMA CONTROL: COMPLETELY CONTROLLED
QUESTION_4 LAST FOUR WEEKS HOW OFTEN HAVE YOU USED YOUR RESCUE INHALER OR NEBULIZER MEDICATION (SUCH AS ALBUTEROL): NOT AT ALL
QUESTION_1 LAST FOUR WEEKS HOW MUCH OF THE TIME DID YOUR ASTHMA KEEP YOU FROM GETTING AS MUCH DONE AT WORK, SCHOOL OR AT HOME: NONE OF THE TIME

## 2024-05-14 ENCOUNTER — OFFICE VISIT (OUTPATIENT)
Dept: FAMILY MEDICINE | Facility: CLINIC | Age: 69
End: 2024-05-14
Payer: COMMERCIAL

## 2024-05-14 VITALS
OXYGEN SATURATION: 97 % | SYSTOLIC BLOOD PRESSURE: 122 MMHG | WEIGHT: 173 LBS | TEMPERATURE: 99.1 F | BODY MASS INDEX: 32.66 KG/M2 | HEART RATE: 94 BPM | DIASTOLIC BLOOD PRESSURE: 74 MMHG | HEIGHT: 61 IN | RESPIRATION RATE: 18 BRPM

## 2024-05-14 DIAGNOSIS — R20.8 BURNING SENSATION OF LOWER EXTREMITY: Primary | ICD-10-CM

## 2024-05-14 PROCEDURE — 99213 OFFICE O/P EST LOW 20 MIN: CPT | Performed by: NURSE PRACTITIONER

## 2024-05-14 ASSESSMENT — ANXIETY QUESTIONNAIRES
7. FEELING AFRAID AS IF SOMETHING AWFUL MIGHT HAPPEN: NOT AT ALL
GAD7 TOTAL SCORE: 3
1. FEELING NERVOUS, ANXIOUS, OR ON EDGE: SEVERAL DAYS
IF YOU CHECKED OFF ANY PROBLEMS ON THIS QUESTIONNAIRE, HOW DIFFICULT HAVE THESE PROBLEMS MADE IT FOR YOU TO DO YOUR WORK, TAKE CARE OF THINGS AT HOME, OR GET ALONG WITH OTHER PEOPLE: SOMEWHAT DIFFICULT
8. IF YOU CHECKED OFF ANY PROBLEMS, HOW DIFFICULT HAVE THESE MADE IT FOR YOU TO DO YOUR WORK, TAKE CARE OF THINGS AT HOME, OR GET ALONG WITH OTHER PEOPLE?: SOMEWHAT DIFFICULT
6. BECOMING EASILY ANNOYED OR IRRITABLE: SEVERAL DAYS
5. BEING SO RESTLESS THAT IT IS HARD TO SIT STILL: NOT AT ALL
7. FEELING AFRAID AS IF SOMETHING AWFUL MIGHT HAPPEN: NOT AT ALL
GAD7 TOTAL SCORE: 3
3. WORRYING TOO MUCH ABOUT DIFFERENT THINGS: SEVERAL DAYS
GAD7 TOTAL SCORE: 3
4. TROUBLE RELAXING: NOT AT ALL
2. NOT BEING ABLE TO STOP OR CONTROL WORRYING: NOT AT ALL

## 2024-05-14 NOTE — PROGRESS NOTES
"  Assessment & Plan     Austin was seen today for recheck medication.    Diagnoses and all orders for this visit:    Burning sensation of lower extremity  Previous work-up with lab evaluation in June 2023, no noted abnormalities.   Has been on Gabapentin 300 mg at bedtime without noted improvement, stopped medication 3 weeks ago.   Will plan to proceed with EMG for further evaluation.    Discussed considering trial of Pregabalin (Lyrica).    Encouraged follow-up telephone/virtual visit after EMG completed.    -     EMG; Future        BMI  Estimated body mass index is 32.69 kg/m  as calculated from the following:    Height as of this encounter: 1.549 m (5' 1\").    Weight as of this encounter: 78.5 kg (173 lb).     Return in about 4 weeks (around 6/11/2024) for No improvement or sooner with worsening symptoms.          Subjective   Austin is a 68 year old, presenting for the following health issues:  Recheck Medication        5/14/2024     3:24 PM   Additional Questions   Roomed by Myesha SIDHU     History of Present Illness       Reason for visit:  Neuropathy pain- Gabapentin- never felt it was helping, since the beginning, has been on it for a few months.    She eats 0-1 servings of fruits and vegetables daily.She consumes 0 sweetened beverage(s) daily.She exercises with enough effort to increase her heart rate 9 or less minutes per day.  She exercises with enough effort to increase her heart rate 3 or less days per week.   She is taking medications regularly.     Patient was seen June 2023 for burning/tingling sensation in feet.  Burning/hot sensation started many years ago, but was very intermittent previously.  Became more persistent the summer of 2023.    Started on Gabapentin 300 mg at bedtime by Ryann Yip CNP in July 2023.    Symptoms continue to be the worse at night.  Does have some daytime symptoms. Feet and legs feel hot and achy.  Will at times need to get an ice pack to help with the hot sensation.  " "Mostly in bilateral feet and radiates up to both calf regions.    Denies lower back concerns.   Knees and ankle joint pain over the past 3 days.    Takes Gabapentin every night, but doesn't feel like it helps.  Stopped taking 3-4 weeks and pain is similar.      Spent the most of winter in [place] and [place].  Recently met a sister that she didn't know she had that lives in [place].          Review of Systems  Constitutional, HEENT, cardiovascular, pulmonary, gi and gu systems are negative, except as otherwise noted.      Objective    /74   Pulse 94   Temp 99.1  F (37.3  C)   Resp 18   Ht 1.549 m (5' 1\")   Wt 78.5 kg (173 lb)   LMP  (LMP Unknown)   SpO2 97%   BMI 32.69 kg/m    Body mass index is 32.69 kg/m .    Physical Exam     GENERAL: alert and no distress  RESP: lungs clear to auscultation - no rales, rhonchi or wheezes  CV: regular rate and rhythm, normal S1 S2  PSYCH: mentation appears normal, affect normal/bright    Any complaints of increased pain or numbness ?  YES burning sensation           Sensation Testing done at all points on the diagram with monofilament     Right Foot: Sensation Normal at all points  Left Foot: Sensation Normal at all points     Risk Category: 0- No loss of protective sensation  Performed by TANYA Tavera CNP                    Signed Electronically by: TANYA Tavera CNP    "

## 2024-06-06 ENCOUNTER — TRANSFERRED RECORDS (OUTPATIENT)
Dept: HEALTH INFORMATION MANAGEMENT | Facility: CLINIC | Age: 69
End: 2024-06-06
Payer: COMMERCIAL

## 2024-06-14 ENCOUNTER — LAB (OUTPATIENT)
Dept: LAB | Facility: CLINIC | Age: 69
End: 2024-06-14
Payer: COMMERCIAL

## 2024-06-14 DIAGNOSIS — G62.9 PERIPHERAL NERVE DISORDER: ICD-10-CM

## 2024-06-14 DIAGNOSIS — M79.672 LEFT FOOT PAIN: ICD-10-CM

## 2024-06-14 DIAGNOSIS — E55.9 VITAMIN D DEFICIENCY: Primary | ICD-10-CM

## 2024-06-14 DIAGNOSIS — E55.9 VITAMIN D DEFICIENCY: ICD-10-CM

## 2024-06-14 LAB
Lab: NORMAL
PERFORMING LABORATORY: NORMAL
SPECIMEN STATUS: NORMAL
TEST NAME: NORMAL

## 2024-06-14 PROCEDURE — 82306 VITAMIN D 25 HYDROXY: CPT

## 2024-06-14 PROCEDURE — 83921 ORGANIC ACID SINGLE QUANT: CPT | Mod: GA

## 2024-06-14 PROCEDURE — 86334 IMMUNOFIX E-PHORESIS SERUM: CPT | Performed by: PATHOLOGY

## 2024-06-14 PROCEDURE — 36415 COLL VENOUS BLD VENIPUNCTURE: CPT

## 2024-06-15 LAB — VIT D+METAB SERPL-MCNC: 26 NG/ML (ref 20–50)

## 2024-06-17 LAB — MISCELLANEOUS TEST 1 (ARUP): NORMAL

## 2024-06-18 LAB — PROT PATTERN SERPL IFE-IMP: NORMAL

## 2024-08-07 ENCOUNTER — OFFICE VISIT (OUTPATIENT)
Dept: FAMILY MEDICINE | Facility: CLINIC | Age: 69
End: 2024-08-07
Attending: FAMILY MEDICINE
Payer: COMMERCIAL

## 2024-08-07 VITALS
BODY MASS INDEX: 33.04 KG/M2 | HEIGHT: 61 IN | RESPIRATION RATE: 18 BRPM | DIASTOLIC BLOOD PRESSURE: 80 MMHG | OXYGEN SATURATION: 99 % | SYSTOLIC BLOOD PRESSURE: 110 MMHG | TEMPERATURE: 97.6 F | WEIGHT: 175 LBS | HEART RATE: 80 BPM

## 2024-08-07 DIAGNOSIS — Z13.1 SCREENING FOR DIABETES MELLITUS: ICD-10-CM

## 2024-08-07 DIAGNOSIS — K26.9 DUODENAL ULCER WITHOUT HEMORRHAGE OR PERFORATION AND WITHOUT OBSTRUCTION: ICD-10-CM

## 2024-08-07 DIAGNOSIS — Z13.220 SCREENING FOR LIPID DISORDERS: ICD-10-CM

## 2024-08-07 DIAGNOSIS — G62.9 PERIPHERAL POLYNEUROPATHY: ICD-10-CM

## 2024-08-07 DIAGNOSIS — Z13.0 SCREENING FOR DEFICIENCY ANEMIA: ICD-10-CM

## 2024-08-07 DIAGNOSIS — L30.9 ECZEMA, UNSPECIFIED TYPE: ICD-10-CM

## 2024-08-07 DIAGNOSIS — Z12.31 ENCOUNTER FOR SCREENING MAMMOGRAM FOR BREAST CANCER: ICD-10-CM

## 2024-08-07 DIAGNOSIS — F41.9 ANXIETY: ICD-10-CM

## 2024-08-07 DIAGNOSIS — Z00.00 ENCOUNTER FOR MEDICARE ANNUAL WELLNESS EXAM: Primary | ICD-10-CM

## 2024-08-07 DIAGNOSIS — F33.1 MODERATE RECURRENT MAJOR DEPRESSION (H): ICD-10-CM

## 2024-08-07 LAB
ALBUMIN SERPL BCG-MCNC: 4.2 G/DL (ref 3.5–5.2)
ALP SERPL-CCNC: 127 U/L (ref 40–150)
ALT SERPL W P-5'-P-CCNC: 11 U/L (ref 0–50)
ANION GAP SERPL CALCULATED.3IONS-SCNC: 10 MMOL/L (ref 7–15)
AST SERPL W P-5'-P-CCNC: 23 U/L (ref 0–45)
BILIRUB SERPL-MCNC: 0.3 MG/DL
BUN SERPL-MCNC: 7.4 MG/DL (ref 8–23)
CALCIUM SERPL-MCNC: 9.6 MG/DL (ref 8.8–10.4)
CHLORIDE SERPL-SCNC: 102 MMOL/L (ref 98–107)
CHOLEST SERPL-MCNC: 224 MG/DL
CREAT SERPL-MCNC: 0.8 MG/DL (ref 0.51–0.95)
EGFRCR SERPLBLD CKD-EPI 2021: 79 ML/MIN/1.73M2
ERYTHROCYTE [DISTWIDTH] IN BLOOD BY AUTOMATED COUNT: 12.4 % (ref 10–15)
FASTING STATUS PATIENT QL REPORTED: YES
FASTING STATUS PATIENT QL REPORTED: YES
GLUCOSE SERPL-MCNC: 107 MG/DL (ref 70–99)
HCO3 SERPL-SCNC: 25 MMOL/L (ref 22–29)
HCT VFR BLD AUTO: 43.8 % (ref 35–47)
HDLC SERPL-MCNC: 53 MG/DL
HGB BLD-MCNC: 14.3 G/DL (ref 11.7–15.7)
LDLC SERPL CALC-MCNC: 148 MG/DL
MCH RBC QN AUTO: 29.9 PG (ref 26.5–33)
MCHC RBC AUTO-ENTMCNC: 32.6 G/DL (ref 31.5–36.5)
MCV RBC AUTO: 92 FL (ref 78–100)
NONHDLC SERPL-MCNC: 171 MG/DL
PLATELET # BLD AUTO: 314 10E3/UL (ref 150–450)
POTASSIUM SERPL-SCNC: 5 MMOL/L (ref 3.4–5.3)
PROT SERPL-MCNC: 7.1 G/DL (ref 6.4–8.3)
RBC # BLD AUTO: 4.78 10E6/UL (ref 3.8–5.2)
SODIUM SERPL-SCNC: 137 MMOL/L (ref 135–145)
TRIGL SERPL-MCNC: 116 MG/DL
WBC # BLD AUTO: 6 10E3/UL (ref 4–11)

## 2024-08-07 PROCEDURE — G0439 PPPS, SUBSEQ VISIT: HCPCS | Performed by: NURSE PRACTITIONER

## 2024-08-07 PROCEDURE — 85027 COMPLETE CBC AUTOMATED: CPT | Performed by: NURSE PRACTITIONER

## 2024-08-07 PROCEDURE — 99214 OFFICE O/P EST MOD 30 MIN: CPT | Mod: 25 | Performed by: NURSE PRACTITIONER

## 2024-08-07 PROCEDURE — 80061 LIPID PANEL: CPT | Performed by: NURSE PRACTITIONER

## 2024-08-07 PROCEDURE — 80053 COMPREHEN METABOLIC PANEL: CPT | Performed by: NURSE PRACTITIONER

## 2024-08-07 PROCEDURE — 36415 COLL VENOUS BLD VENIPUNCTURE: CPT | Performed by: NURSE PRACTITIONER

## 2024-08-07 RX ORDER — VENLAFAXINE HYDROCHLORIDE 75 MG/1
75 CAPSULE, EXTENDED RELEASE ORAL DAILY
Qty: 90 CAPSULE | Refills: 3 | Status: SHIPPED | OUTPATIENT
Start: 2024-08-07

## 2024-08-07 RX ORDER — VENLAFAXINE HYDROCHLORIDE 150 MG/1
150 CAPSULE, EXTENDED RELEASE ORAL DAILY
Qty: 90 CAPSULE | Refills: 3 | Status: SHIPPED | OUTPATIENT
Start: 2024-08-07

## 2024-08-07 RX ORDER — HYDROXYZINE HYDROCHLORIDE 10 MG/1
10-20 TABLET, FILM COATED ORAL
Qty: 90 TABLET | Refills: 5 | Status: SHIPPED | OUTPATIENT
Start: 2024-08-07

## 2024-08-07 SDOH — HEALTH STABILITY: PHYSICAL HEALTH: ON AVERAGE, HOW MANY DAYS PER WEEK DO YOU ENGAGE IN MODERATE TO STRENUOUS EXERCISE (LIKE A BRISK WALK)?: 1 DAY

## 2024-08-07 ASSESSMENT — ASTHMA QUESTIONNAIRES
QUESTION_2 LAST FOUR WEEKS HOW OFTEN HAVE YOU HAD SHORTNESS OF BREATH: NOT AT ALL
QUESTION_4 LAST FOUR WEEKS HOW OFTEN HAVE YOU USED YOUR RESCUE INHALER OR NEBULIZER MEDICATION (SUCH AS ALBUTEROL): NOT AT ALL
QUESTION_3 LAST FOUR WEEKS HOW OFTEN DID YOUR ASTHMA SYMPTOMS (WHEEZING, COUGHING, SHORTNESS OF BREATH, CHEST TIGHTNESS OR PAIN) WAKE YOU UP AT NIGHT OR EARLIER THAN USUAL IN THE MORNING: NOT AT ALL
ACT_TOTALSCORE: 24
QUESTION_5 LAST FOUR WEEKS HOW WOULD YOU RATE YOUR ASTHMA CONTROL: WELL CONTROLLED
QUESTION_1 LAST FOUR WEEKS HOW MUCH OF THE TIME DID YOUR ASTHMA KEEP YOU FROM GETTING AS MUCH DONE AT WORK, SCHOOL OR AT HOME: NONE OF THE TIME
ACT_TOTALSCORE: 24

## 2024-08-07 ASSESSMENT — ANXIETY QUESTIONNAIRES
5. BEING SO RESTLESS THAT IT IS HARD TO SIT STILL: MORE THAN HALF THE DAYS
4. TROUBLE RELAXING: MORE THAN HALF THE DAYS
GAD7 TOTAL SCORE: 10
IF YOU CHECKED OFF ANY PROBLEMS ON THIS QUESTIONNAIRE, HOW DIFFICULT HAVE THESE PROBLEMS MADE IT FOR YOU TO DO YOUR WORK, TAKE CARE OF THINGS AT HOME, OR GET ALONG WITH OTHER PEOPLE: SOMEWHAT DIFFICULT
GAD7 TOTAL SCORE: 10
IF YOU CHECKED OFF ANY PROBLEMS ON THIS QUESTIONNAIRE, HOW DIFFICULT HAVE THESE PROBLEMS MADE IT FOR YOU TO DO YOUR WORK, TAKE CARE OF THINGS AT HOME, OR GET ALONG WITH OTHER PEOPLE: SOMEWHAT DIFFICULT
7. FEELING AFRAID AS IF SOMETHING AWFUL MIGHT HAPPEN: NOT AT ALL
2. NOT BEING ABLE TO STOP OR CONTROL WORRYING: MORE THAN HALF THE DAYS
GAD7 TOTAL SCORE: 10
6. BECOMING EASILY ANNOYED OR IRRITABLE: SEVERAL DAYS
1. FEELING NERVOUS, ANXIOUS, OR ON EDGE: SEVERAL DAYS
1. FEELING NERVOUS, ANXIOUS, OR ON EDGE: SEVERAL DAYS
3. WORRYING TOO MUCH ABOUT DIFFERENT THINGS: MORE THAN HALF THE DAYS
6. BECOMING EASILY ANNOYED OR IRRITABLE: SEVERAL DAYS
3. WORRYING TOO MUCH ABOUT DIFFERENT THINGS: MORE THAN HALF THE DAYS
8. IF YOU CHECKED OFF ANY PROBLEMS, HOW DIFFICULT HAVE THESE MADE IT FOR YOU TO DO YOUR WORK, TAKE CARE OF THINGS AT HOME, OR GET ALONG WITH OTHER PEOPLE?: SOMEWHAT DIFFICULT
4. TROUBLE RELAXING: MORE THAN HALF THE DAYS
7. FEELING AFRAID AS IF SOMETHING AWFUL MIGHT HAPPEN: NOT AT ALL
GAD7 TOTAL SCORE: 10
7. FEELING AFRAID AS IF SOMETHING AWFUL MIGHT HAPPEN: NOT AT ALL
5. BEING SO RESTLESS THAT IT IS HARD TO SIT STILL: MORE THAN HALF THE DAYS
2. NOT BEING ABLE TO STOP OR CONTROL WORRYING: MORE THAN HALF THE DAYS

## 2024-08-07 ASSESSMENT — SOCIAL DETERMINANTS OF HEALTH (SDOH): HOW OFTEN DO YOU GET TOGETHER WITH FRIENDS OR RELATIVES?: ONCE A WEEK

## 2024-08-07 ASSESSMENT — PATIENT HEALTH QUESTIONNAIRE - PHQ9
SUM OF ALL RESPONSES TO PHQ QUESTIONS 1-9: 15
SUM OF ALL RESPONSES TO PHQ QUESTIONS 1-9: 15
10. IF YOU CHECKED OFF ANY PROBLEMS, HOW DIFFICULT HAVE THESE PROBLEMS MADE IT FOR YOU TO DO YOUR WORK, TAKE CARE OF THINGS AT HOME, OR GET ALONG WITH OTHER PEOPLE: SOMEWHAT DIFFICULT
SUM OF ALL RESPONSES TO PHQ QUESTIONS 1-9: 15

## 2024-08-07 NOTE — PROGRESS NOTES
Preventive Care Visit  [facility] PRIOR TANYA Cadet CNP, Family Medicine  Aug 7, 2024      Assessment & Plan     Austin was seen today for physical.    Diagnoses and all orders for this visit:    Encounter for Medicare annual wellness exam  -     PRIMARY CARE FOLLOW-UP SCHEDULING    Encounter for screening mammogram for breast cancer  -     MA Screen Bilateral w/Gonzales; Future    Screening for diabetes mellitus  -     Comprehensive metabolic panel (BMP + Alb, Alk Phos, ALT, AST, Total. Bili, TP)    Screening for deficiency anemia  -     CBC with platelets    Screening for lipid disorders  -     Lipid panel reflex to direct LDL Fasting    Peripheral polyneuropathy  Consultation with neurology - 6/6/24 with [facility].    Recommended EMG  Onset of neuropathy in 2016.   No improvement with Gabapentin.   Considered trial of Lyrica.    Doesn't want to take medications for this.    Consider medical cannabis certification.      Moderate recurrent major depression (H)  Anxiety      5/14/2024     3:21 PM 8/7/2024    10:25 AM 8/7/2024    11:55 AM   ELEAZAR-7 SCORE   Total Score 3 (minimal anxiety)  10 (moderate anxiety)   Total Score 3 10 10         5/14/2024     3:20 PM 8/7/2024    10:22 AM 8/7/2024    11:55 AM   PHQ   PHQ-9 Total Score 9 15 15   Q9: Thoughts of better off dead/self-harm past 2 weeks Not at all More than half the days More than half the days   F/U: Thoughts of suicide or self-harm  Yes Yes   F/U: Self harm-plan  No No   F/U: Self-harm action  No No   F/U: Safety concerns  No No   Worsening depressive and anxiety symptoms.    Recommend dose increase of Effexor XR to 225 mg once daily with close follow-up in 4 weeks (sooner as needed).    -     venlafaxine (EFFEXOR XR) 150 MG 24 hr capsule; Take 1 capsule (150 mg) by mouth daily (Total daily dose: 225 mg)  -     venlafaxine (EFFEXOR XR) 75 MG 24 hr capsule; Take 1 capsule (75 mg) by mouth daily (Total daily dose:  " 225 mg)    Eczema, unspecified type  -     hydrOXYzine HCl (ATARAX) 10 MG tablet; Take 1-2 tablets (10-20 mg) by mouth nightly as needed for other (insomnia)    Duodenal ulcer without hemorrhage or perforation and without obstruction  Stable on PPI.    -     omeprazole (PRILOSEC) 20 MG DR capsule; Take 1 capsule (20 mg) by mouth daily        BMI  Estimated body mass index is 33.07 kg/m  as calculated from the following:    Height as of this encounter: 1.549 m (5' 1\").    Weight as of this encounter: 79.4 kg (175 lb).       Counseling  Appropriate preventive services were addressed with this patient. Checklist reviewing preventive services available has been given to the patient.    Return in about 4 weeks (around 9/4/2024) for Med check, Telephone Visit.      Sophie Avila is a 68 year old, presenting for the following:  Physical        8/7/2024    12:05 PM   Additional Questions   Roomed by Bonnie     Health Care Directive  Patient has a Health Care Directive on file  Advance care planning document is on file and is current.    HPI      Social:   for 51 years, three children, five grandchildren  Planning travel to [place] this winter and then [place] in [place].      Neuropathy  Did a lot of walking this past weekend when she was at a convention, it took her two days to calm down her legs.    Consultation with neurology - 6/6/24 with [facility].    Recommended EMG  Onset of neuropathy in 2016.   No improvement with Gabapentin.   Considered trial of Lyrica.    Doesn't want to take medications for this.      Depression and Anxiety   Thinks that she would be a lot happier if she was not here, doesn't dwell on this and wouldn't do anything because she doesn't want to hurt her family.  Denies active suicidal intent or plan.      How are you doing with your depression since your last visit? A lot of times ok then gets depressed  How are you doing with your anxiety since your " last visit?  No change  Are you having other symptoms that might be associated with depression or anxiety? No  Have you had a significant life event? Not usre   Do you have any concerns with your use of alcohol or other drugs? No    Social History     Tobacco Use    Smoking status: Former     Current packs/day: 0.00     Average packs/day: 1 pack/day for 5.0 years (5.0 ttl pk-yrs)     Types: Cigarettes     Start date: 1969     Quit date: 3/20/1974     Years since quittin.4    Smokeless tobacco: Never   Vaping Use    Vaping status: Never Used   Substance Use Topics    Alcohol use: Yes     Comment: About 3-4 a [facility]    Drug use: No         2024     3:20 PM 2024    10:22 AM 2024    11:55 AM   PHQ   PHQ-9 Total Score 9 15 15   Q9: Thoughts of better off dead/self-harm past 2 weeks Not at all More than half the days More than half the days   F/U: Thoughts of suicide or self-harm  Yes Yes   F/U: Self harm-plan  No No   F/U: Self-harm action  No No   F/U: Safety concerns  No No         2024     3:21 PM 2024    10:25 AM 2024    11:55 AM   ELEAZAR-7 SCORE   Total Score 3 (minimal anxiety)  10 (moderate anxiety)   Total Score 3 10 10           2024   General Health   How would you rate your overall physical health? Good   Feel stress (tense, anxious, or unable to sleep) Rather much      (!) STRESS CONCERN      2024   Nutrition   Diet: Gluten-free/reduced            2024   Exercise   Days per week of moderate/strenous exercise 1 day      (!) EXERCISE CONCERN      2024   Social Factors   Frequency of gathering with friends or relatives Once a week   Worry food won't last until get money to buy more No   Food not last or not have enough money for food? No   Do you have housing? (Housing is defined as stable permanent housing and does not include staying ouside in a car, in a tent, in an abandoned building, in an overnight shelter, or couch-surfing.) Yes   Are you worried about  losing your housing? No   Lack of transportation? No   Unable to get utilities (heat,electricity)? No            8/7/2024   Fall Risk   Fallen 2 or more times in the past year? No   Trouble with walking or balance? No             8/7/2024   Activities of Daily Living- Home Safety   Needs help with the following daily activites None of the above   Safety concerns in the home None of the above            8/7/2024   Dental   Dentist two times every year? Yes            8/7/2024   Hearing Screening   Hearing concerns? (!) I FEEL THAT PEOPLE ARE MUMBLING OR NOT SPEAKING CLEARLY.    (!) I NEED TO ASK PEOPLE TO SPEAK UP OR REPEAT THEMSELVES.    (!) IT'S HARDER TO UNDERSTAND WOMEN'S VOICES THAN MEN'S VOICES.    (!) IT'S HARD TO FOLLOW A CONVERSATION IN A NOISY RESTAURANT OR CROWDED ROOM.    (!) TROUBLE UNDERSTANDING SOFT OR WHISPERED SPEECH.       Multiple values from one day are sorted in reverse-chronological order         8/7/2024   Driving Risk Screening   Patient/family members have concerns about driving No            8/7/2024   General Alertness/Fatigue Screening   Have you been more tired than usual lately? No            8/7/2024   Urinary Incontinence Screening   Bothered by leaking urine in past 6 months No            8/7/2024   TB Screening   Were you born outside of the [place]? No          Today's PHQ-9 Score:       8/7/2024    11:55 AM   PHQ-9 SCORE   PHQ-9 Total Score MyChart 15 (Moderately severe depression)   PHQ-9 Total Score 15         8/7/2024   Substance Use   Alcohol more than 3/day or more than 7/wk No   Do you have a current opioid prescription? No   How severe/bad is pain from 1 to 10? 5/10   Do you use any other substances recreationally? (!) CANNABIS PRODUCTS        Social History     Tobacco Use    Smoking status: Former     Current packs/day: 0.00     Average packs/day: 1 pack/day for 5.0 years (5.0 ttl pk-yrs)     Types: Cigarettes     Start date: 6/1/1969     Quit date: 3/20/1974     Years since  quittin.4    Smokeless tobacco: Never   Vaping Use    Vaping status: Never Used   Substance Use Topics    Alcohol use: Yes     Comment: About 3-4 a [facility]    Drug use: No           2023   LAST FHS-7 RESULTS   1st degree relative breast or ovarian cancer No   Any relative bilateral breast cancer No   Any male have breast cancer No   Any ONE woman have BOTH breast AND ovarian cancer No   Any woman with breast cancer before 50yrs No   2 or more relatives with breast AND/OR ovarian cancer No   2 or more relatives with breast AND/OR bowel cancer No        Mammogram Screening - Mammogram every 1-2 years updated in Health Maintenance based on mutual decision making      History of abnormal Pap smear: Status post hysterectomy with removal of cervix and no history of CIN2 or greater or cervical cancer. Health Maintenance and Surgical History updated.       ASCVD Risk   The 10-year ASCVD risk score (Nathan PEÑA, et al., 2019) is: 7.4%    Values used to calculate the score:      Age: 68 years      Sex: Female      Is Non- : No      Diabetic: No      Tobacco smoker: No      Systolic Blood Pressure: 110 mmHg      Is BP treated: No      HDL Cholesterol: 53 mg/dL      Total Cholesterol: 224 mg/dL      Reviewed and updated as needed this visit by Provider       Med Hx  Surg Hx             BP Readings from Last 3 Encounters:   24 110/80   24 122/74   23 108/70    Wt Readings from Last 3 Encounters:   24 79.4 kg (175 lb)   24 78.5 kg (173 lb)   23 82.3 kg (181 lb 8 oz)                  Patient Active Problem List   Diagnosis    Moderate recurrent major depression (H)    Intermittent asthma    CARDIOVASCULAR SCREENING; LDL GOAL LESS THAN 160    Anxiety    Vitamin D deficiency    Overflow diarrhea    Celiac disease    Age-related osteoporosis without current pathological fracture     Past Surgical History:   Procedure Laterality Date    ABDOMEN SURGERY       ? Enteritis as a child, possibly appendix    APPENDECTOMY      BIOPSY  10/05/21    COLONOSCOPY      Cant remember the date    GENITOURINARY SURGERY      Cant remember the date    HYSTERECTOMY TOTAL ABDOMINAL, BILATERAL SALPINGO-OOPHORECTOMY, COMBINED      OPEN REDUCTION INTERNAL FIXATION WRIST Left 2021    Left distal radius ORIF with Synthes volar plate (narrow plate with 3 proximal screw hole plate), Dr. Fahad Longoria, [facility]    TONSILLECTOMY         Social History     Tobacco Use    Smoking status: Former     Current packs/day: 0.00     Average packs/day: 1 pack/day for 5.0 years (5.0 ttl pk-yrs)     Types: Cigarettes     Start date: 1969     Quit date: 3/20/1974     Years since quittin.4    Smokeless tobacco: Never   Substance Use Topics    Alcohol use: Yes     Comment: About 3-4 a [facility]     Family History   Problem Relation Age of Onset    Cardiovascular Mother     Hypertension Mother     Depression Mother     Cardiovascular Maternal Grandmother     Cancer Father         Kidney    Other Cancer Father     Myocardial Infarction Brother 60        2017    Cerebrovascular Disease Brother 63        2017    Mental Illness Brother     Diabetes Brother     Myocardial Infarction Brother     Hypertension Brother         Had bad stoke     Cerebrovascular Disease Brother     Asthma Brother     Diabetes Brother         Passed away at 60 Heart attack    Hypertension Brother     Other Cancer Maternal Grandfather          Current Outpatient Medications   Medication Sig Dispense Refill    hydrOXYzine HCl (ATARAX) 10 MG tablet Take 1-2 tablets (10-20 mg) by mouth nightly as needed for other (insomnia) 90 tablet 5    LORazepam (ATIVAN) 0.5 MG tablet TAKE 1/2 TO 1 (ONE-HALF TO ONE) TABLET BY MOUTH EVERY 8 HOURS AS NEEDED FOR ANXIETY 30 tablet 0    Omega-3 Fish Oil 500 MG capsule Take 1 capsule (500 mg) by mouth 2 times daily 180 capsule 3    omeprazole (PRILOSEC) 20 MG DR capsule Take 1 capsule (20  mg) by mouth daily 90 capsule 3    Probiotic Product (PROBIOTIC DAILY) CAPS Take 1 capsule by mouth daily 90 capsule 3    venlafaxine (EFFEXOR XR) 150 MG 24 hr capsule Take 1 capsule (150 mg) by mouth daily (Total daily dose: 225 mg) 90 capsule 3    venlafaxine (EFFEXOR XR) 75 MG 24 hr capsule Take 1 capsule (75 mg) by mouth daily (Total daily dose:  225 mg) 90 capsule 3    Vitamin D3 (CHOLECALCIFEROL) 25 mcg (1000 units) tablet Take 1 tablet (25 mcg) by mouth daily 90 tablet 3    albuterol (PROAIR HFA) 108 (90 Base) MCG/ACT inhaler Inhale 2 puffs into the lungs every 6 hours as needed for shortness of breath / dyspnea 2 Inhaler 1    calcium carbonate (OS-ZACH) 500 MG tablet Take 1 tablet (500 mg) by mouth 2 times daily 180 tablet 1    clobetasol (TEMOVATE) 0.05 % external ointment Apply to AA BID x 1-2 weeks then PRN only 60 g 3    clobetasol (TEMOVATE) 0.05 % external solution Apply topically 2 times daily      folic acid (FOLVITE) 1 MG tablet Take 1 mg by mouth daily       Current providers sharing in care for this patient include:  Patient Care Team:  Dunia Desai APRN CNP as PCP - General (Nurse Practitioner - Family)  Sally Patrick PA-C as Physician Assistant (Dermatology)  Ryann Almanzar APRN CNP as Referring Physician (Nurse Practitioner - Family)  Per Kern MD as Assigned Surgical Provider  Manuel Mancilla DO as Assigned PCP    The following health maintenance items are reviewed in Epic and correct as of today:  Health Maintenance   Topic Date Due    ZOSTER IMMUNIZATION (1 of 2) Never done    RSV VACCINE (Pregnancy & 60+) (1 - 1-dose 60+ series) Never done    ASTHMA ACTION PLAN  10/03/2020    COVID-19 Vaccine (4 - 2023-24 season) 03/14/2024    INFLUENZA VACCINE (1) 09/01/2024    ANNUAL REVIEW OF HM ORDERS  11/14/2024    ASTHMA CONTROL TEST  02/07/2025    PHQ-9  02/07/2025    MAMMO SCREENING  06/13/2025    MEDICARE ANNUAL WELLNESS VISIT  08/07/2025    FALL RISK ASSESSMENT  08/07/2025  "   DTAP/TDAP/TD IMMUNIZATION (2 - Td or Tdap) 09/15/2026    GLUCOSE  08/07/2027    LIPID  08/07/2029    ADVANCE CARE PLANNING  08/07/2029    COLORECTAL CANCER SCREENING  08/16/2029    DEXA  10/18/2034    HEPATITIS C SCREENING  Completed    DEPRESSION ACTION PLAN  Completed    Pneumococcal Vaccine: 65+ Years  Completed    IPV IMMUNIZATION  Aged Out    HPV IMMUNIZATION  Aged Out    MENINGITIS IMMUNIZATION  Aged Out    RSV MONOCLONAL ANTIBODY  Aged Out         Review of Systems  Constitutional, HEENT, cardiovascular, pulmonary, gi and gu systems are negative, except as otherwise noted.     Objective    Exam  /80   Pulse 80   Temp 97.6  F (36.4  C) (Tympanic)   Resp 18   Ht 1.549 m (5' 1\")   Wt 79.4 kg (175 lb)   LMP  (LMP Unknown)   SpO2 99%   BMI 33.07 kg/m     Estimated body mass index is 33.07 kg/m  as calculated from the following:    Height as of this encounter: 1.549 m (5' 1\").    Weight as of this encounter: 79.4 kg (175 lb).    Physical Exam  GENERAL: alert and no distress  EYES: Eyes grossly normal to inspection  HENT: ear canals and TM's normal, nose and mouth without ulcers or lesions  NECK: no adenopathy, no asymmetry, masses, or scars  RESP: lungs clear to auscultation - no rales, rhonchi or wheezes  CV: regular rate and rhythm, normal S1 S2, no S3 or S4, no murmur, click or rub, no peripheral edema  ABDOMEN: soft, nontender, no hepatosplenomegaly, no masses and bowel sounds normal  MS: no gross musculoskeletal defects noted, no edema  SKIN: no suspicious lesions or rashes  NEURO: Normal strength and tone, mentation intact and speech normal  PSYCH: mentation appears normal, affect normal/bright        8/7/2024   Mini Cog   Clock Draw Score 2 Normal   3 Item Recall 3 objects recalled   Mini Cog Total Score 5               Signed Electronically by: Dunia Desai, TANYA CNP    "

## 2024-08-07 NOTE — PATIENT INSTRUCTIONS
Patient Education   Preventive Care Advice   This is general advice given by our system to help you stay healthy. However, your care team may have specific advice just for you. Please talk to your care team about your preventive care needs.  Nutrition  Eat 5 or more servings of fruits and vegetables each day.  Try wheat bread, brown rice and whole grain pasta (instead of white bread, rice, and pasta).  Get enough calcium and vitamin D. Check the label on foods and aim for 100% of the RDA (recommended daily allowance).  Lifestyle  Exercise at least 150 minutes each week  (30 minutes a day, 5 days a week).  Do muscle strengthening activities 2 days a week. These help control your weight and prevent disease.  No smoking.  Wear sunscreen to prevent skin cancer.  Have a dental exam and cleaning every 6 months.  Yearly exams  See your health care team every year to talk about:  Any changes in your health.  Any medicines your care team has prescribed.  Preventive care, family planning, and ways to prevent chronic diseases.  Shots (vaccines)   HPV shots (up to age 26), if you've never had them before.  Hepatitis B shots (up to age 59), if you've never had them before.  COVID-19 shot: Get this shot when it's due.  Flu shot: Get a flu shot every year.  Tetanus shot: Get a tetanus shot every 10 years.  Pneumococcal, hepatitis A, and RSV shots: Ask your care team if you need these based on your risk.  Shingles shot (for age 50 and up)  General health tests  Diabetes screening:  Starting at age 35, Get screened for diabetes at least every 3 years.  If you are younger than age 35, ask your care team if you should be screened for diabetes.  Cholesterol test: At age 39, start having a cholesterol test every 5 years, or more often if advised.  Bone density scan (DEXA): At age 50, ask your care team if you should have this scan for osteoporosis (brittle bones).  Hepatitis C: Get tested at least once in your life.  STIs (sexually  transmitted infections)  Before age 24: Ask your care team if you should be screened for STIs.  After age 24: Get screened for STIs if you're at risk. You are at risk for STIs (including HIV) if:  You are sexually active with more than one person.  You don't use condoms every time.  You or a partner was diagnosed with a sexually transmitted infection.  If you are at risk for HIV, ask about PrEP medicine to prevent HIV.  Get tested for HIV at least once in your life, whether you are at risk for HIV or not.  Cancer screening tests  Cervical cancer screening: If you have a cervix, begin getting regular cervical cancer screening tests starting at age 21.  Breast cancer scan (mammogram): If you've ever had breasts, begin having regular mammograms starting at age 40. This is a scan to check for breast cancer.  Colon cancer screening: It is important to start screening for colon cancer at age 45.  Have a colonoscopy test every 10 years (or more often if you're at risk) Or, ask your provider about stool tests like a FIT test every year or Cologuard test every 3 years.  To learn more about your testing options, visit:   .  For help making a decision, visit:   https://bit.ly/kd18019.  Prostate cancer screening test: If you have a prostate, ask your care team if a prostate cancer screening test (PSA) at age 55 is right for you.  Lung cancer screening: If you are a current or former smoker ages 50 to 80, ask your care team if ongoing lung cancer screenings are right for you.  For informational purposes only. Not to replace the advice of your health care provider. Copyright   2023 [facility]. All rights reserved. Clinically reviewed by the [facility] Transitions Program. [employer] 015896 - REV 01/24.  Hearing Loss: Care Instructions  Overview     Hearing loss is a sudden or slow decrease in how well you hear. It can range from slight to profound. Permanent hearing loss can occur with aging. It also can  happen when you are exposed long-term to loud noise. Examples include listening to loud music, riding motorcycles, or being around other loud machines.  Hearing loss can affect your work and home life. It can make you feel lonely or depressed. You may feel that you have lost your independence. But hearing aids and other devices can help you hear better and feel connected to others.  Follow-up care is a key part of your treatment and safety. Be sure to make and go to all appointments, and call your doctor if you are having problems. It's also a good idea to know your test results and keep a list of the medicines you take.  How can you care for yourself at home?  Avoid loud noises whenever possible. This helps keep your hearing from getting worse.  Always wear hearing protection around loud noises.  Wear a hearing aid as directed.  A professional can help you pick a hearing aid that will work best for you.  You can also get hearing aids over the counter for mild to moderate hearing loss.  Have hearing tests as your doctor suggests. They can show whether your hearing has changed. Your hearing aid may need to be adjusted.  Use other devices as needed. These may include:  Telephone amplifiers and hearing aids that can connect to a television, stereo, radio, or microphone.  Devices that use lights or vibrations. These alert you to the doorbell, a ringing telephone, or a baby monitor.  Television closed-captioning. This shows the words at the bottom of the screen. Most new TVs can do this.  TTY (text telephone). This lets you type messages back and forth on the telephone instead of talking or listening. These devices are also called TDD. When messages are typed on the keyboard, they are sent over the phone line to a receiving TTY. The message is shown on a monitor.  Use text messaging, social media, and email if it is hard for you to communicate by telephone.  Try to learn a listening technique called speechreading. It is  "not lipreading. You pay attention to people's gestures, expressions, posture, and tone of voice. These clues can help you understand what a person is saying. Face the person you are talking to, and have them face you. Make sure the lighting is good. You need to see the other person's face clearly.  Think about counseling if you need help to adjust to your hearing loss.  When should you call for help?  Watch closely for changes in your health, and be sure to contact your doctor if:    You think your hearing is getting worse.     You have new symptoms, such as dizziness or nausea.   Where can you learn more?  Go to https://www.[employer].net/patiented  Enter R798 in the search box to learn more about \"Hearing Loss: Care Instructions.\"  Current as of: September 27, 2023               Content Version: 14.0    8671-8293 [employer].   Care instructions adapted under license by your healthcare professional. If you have questions about a medical condition or this instruction, always ask your healthcare professional. Healthwise, [employer] disclaims any warranty or liability for your use of this information.         "

## 2024-08-08 NOTE — RESULT ENCOUNTER NOTE
Dear Austin,     -Normal red blood cell (hgb) levels, normal white blood cell count and normal platelet levels.    -LDL(bad) cholesterol level is elevated which can increase your heart disease risk.  A diet high in fat and simple carbohydrates, genetics and being overweight can contribute to this. ADVISE: exercising 150 minutes of aerobic exercise per week (30 minutes for 5 days per week or 50 minutes for 3 days per week are options) and eating a low saturated fat/low carbohydrate diet are helpful to improve this. In 12 months, you should recheck your fasting cholesterol panel.      -Liver and gallbladder tests (ALT,AST, Alk phos,bilirubin) are normal.  -Kidney function (GFR) is normal.  -Sodium is normal.  -Potassium is normal.  -Calcium is normal.  -Glucose is slightly elevated and may be a sign of early diabetes (prediabetes). ADVISE:: eating a low carbohydrate diet, exercising, trying to lose weight (if necessary) and rechecking your glucose level in 12 months.      Thank you,     Dunia Desai, APRN, CNP  [facility] [place]    If you have further questions about the interpretation of your labs, labtestsonline.org is a good website to check out for further information.

## 2024-08-09 NOTE — PATIENT INSTRUCTIONS
Medicare Wellness Visit  Plan for Preventive Care    A good way for you to stay healthy is to use preventive care.  Medicare covers many services that can help you stay healthy.* The goal of these services is to find any health problems as quickly as possible. Finding problems early can help make them easier to treat.  Your personal plan below lists the services you may need and when they are due.      Health Maintenance Summary     Hepatitis B Vaccine (For Physician/APC Discussion) (2 of 3 - 19+ 3-dose series)  Overdue since 9/8/2014    Shingles Vaccine (2 of 3)  Overdue since 3/24/2016    COVID-19 Vaccine (3 - 2023-24 season)  Overdue since 9/1/2023    Medicare Advantage- Medicare Wellness Visit (Yearly - January to December)  Overdue since 1/1/2024    DM/CKD Microalbumin (Yearly)  Order placed this encounter    DM/CKD GFR (Yearly)  Order placed this encounter    Depression Screening (Yearly)  Due soon on 9/12/2024    Influenza Vaccine (1)  Next due on 9/1/2024    DTaP/Tdap/Td Vaccine (2 - Td or Tdap)  Next due on 11/8/2033    Meningococcal Vaccine   Aged Out    Pneumococcal Vaccine 65+   Completed    HPV Vaccine   Aged Out           Preventive Care for Women and Men    Heart Screenings (Cardiovascular):  Blood tests are used to check your cholesterol, lipid and triglyceride levels. High levels can increase your risk for heart disease and stroke. High levels can be treated with medications, diet and exercise. Lowering your levels can help keep your heart and blood vessels healthy.  Your provider will order these tests if they are needed.    An ultrasound is done to see if you have an abdominal aortic aneurysm (AAA).  This is an enlargement of one of the main blood vessels that delivers blood to the body.   In the [place], 9,000 deaths are caused by AAA.  You may not even know you have this problem and as many as 1 in 3 people will have a serious problem if it is not treated.  Early diagnosis allows for  Directions for eczema (atopic dermatitis)    Bathe every day - we recommend short showers or baths (5 minutes or less) with lukewarm water.  Use a gentle soap such as Dove for sensitive skin, Cetaphil, Vanicream or CeraVe   Wash the  dirty areas  only - this means armpits, groin, buttocks and feet.   After the bath or shower, within 2 minutes:   1. Pat dry with towel    2. If prescribed a topical prescription, apply this FIRST to any red/rashy/itchy areas - clobetasol ointment - apply this twice per day for 1-2 weeks at a time when needed.     3. Apply a moisturizer to entire body, even where you just put the prescription medicine. We recommend CeraVe cream, Cetaphil cream, Eucerin cream, Gold Bond or Vanicream. You will do this every single day. If you don t bathe every day we still recommend an application of body moisturizer.     Wound Care Instructions     FOR SUPERFICIAL WOUNDS     [facility] [phone]                 AFTER 24 HOURS YOU SHOULD REMOVE THE BANDAGE AND BEGIN DAILY DRESSING CHANGES AS FOLLOWS:     1) Remove Dressing.     2) Clean and dry the area with tap water using a Q-tip or sterile gauze pad.     3) Apply Vaseline, Aquaphor, Polysporin ointment or Bacitracin ointment over entire wound.  Do NOT use Neosporin ointment.     4) Cover the wound with a band-aid, or a sterile non-stick gauze pad and micropore paper tape    REPEAT THESE INSTRUCTIONS AT LEAST ONCE A DAY UNTIL THE WOUND HAS COMPLETELY HEALED.    It is an old wives tale that a wound heals better when it is exposed to air and allowed to dry out. The wound will heal faster with a better cosmetic result if it is kept moist with ointment and covered with a bandage.    **Do not let the wound dry out.**    Supplies Needed:      *Cotton tipped applicators (Q-tips)    *Vaseline, Aquaphor, Polysporin or Bacitracin Ointment (NOT NEOSPORIN)    *Band-aids or non-stick gauze pads and micropore paper tape.      PATIENT  INFORMATION:    During the healing process you will notice a number of changes. All wounds develop a small halo of redness surrounding the wound.  This means healing is occurring. Severe itching with extensive redness usually indicates sensitivity to the ointment or bandage tape used to dress the wound.  You should call our office if this develops.      Swelling  and/or discoloration around your surgical site is common, particularly when performed around the eye.    All wounds normally drain.  The larger the wound the more drainage there will be.  After 7-10 days, you will notice the wound beginning to shrink and new skin will begin to grow.  The wound is healed when you can see skin has formed over the entire area.  A healed wound has a healthy, shiny look to the surface and is red to dark pink in color to normalize.  Wounds may take approximately 4-6 weeks to heal.  Larger wounds may take 6-8 weeks.  After the wound is healed you may discontinue dressing changes.    You may experience a sensation of tightness as your wound heals. This is normal and will gradually subside.    Your healed wound may be sensitive to temperature changes. This sensitivity improves with time, but if you re having a lot of discomfort, try to avoid temperature extremes.    Patients frequently experience itching after their wound appears to have healed because of the continue healing under the skin.  Plain Vaseline will help relieve the itching.      POSSIBLE COMPLICATIONS    BLEEDIN. Leave the bandage in place.  5. Use tightly rolled up gauze or a cloth to apply direct pressure over the bandage for 30  minutes.  6. Reapply pressure for an additional 30 minutes if necessary  7. Use additional gauze and tape to maintain pressure once the bleeding has stopped.      WOUND CARE INSTRUCTIONS   FOR CRYOSURGERY   This area treated with liquid nitrogen should form a blister (areas treated may or may not blister-skin may just turn dark and  more effective treatment and cure.  If you have a family history of AAA or are a male age 65-75 who has smoked, you are at higher risk of an AAA.  Your provider can order this test, if needed.    Colorectal Screening:  There are many tests that are used to check for cancer of your colon and rectum. You and your provider should discuss what test is best for you and when to have it done.  Options include:  Screening Colonoscopy: exam of the entire colon, seen through a flexible lighted tube.  Flexible Sigmoidoscopy: exam of the last third (sigmoid portion) of the colon and rectum, seen through a flexible lighted tube.  Cologuard DNA stool test: a sample of your stool is used to screen for cancer and unseen blood in your stool.  Fecal Occult Blood Test: a sample of your stool is studied to find any unseen blood    Flu Shot:  An immunization that helps to prevent influenza (the flu). You should get this every year. The best time to get the shot is in the fall.    Pneumococcal Shot:  Vaccines help prevent pneumococcal disease, which is any type of illness caused by Streptococcus pneumoniae bacteria. There are two kinds of pneumococcal vaccines available in the [place]:   Pneumococcal conjugate vaccines (PCV20 or Iwxkjxi95®)  Pneumococcal polysaccharide vaccine (PPSV23 or Wlaugtoql74®)  For those who have never received any pneumococcal conjugate vaccine, CDC recommends PVC20 for adults 65 years or older and adults 19 through 64 years old with certain medical conditions or risk factors.   For those who have previously received PCV13, this should be followed by a dose of PPSV23.     Hepatitis B Shot:  An immunization that helps to protect people from getting Hepatitis B. Hepatitis B is a virus that spreads through contact with infected blood or body fluids. Many people with the virus do not have symptoms.  The virus can lead to serious problems, such as liver disease. Some people are at higher risk than others. Your  doctor will tell you if you need this shot.     Diabetes Screening:  A test to measure sugar (glucose) in your blood is called a fasting blood sugar. Fasting means you cannot have food or drink for at least 8 hours before the test. This test can detect diabetes long before you may notice symptoms.    Glaucoma Screening:  Glaucoma screening is performed by your eye doctor. The test measures the fluid pressure inside your eyes to determine if you have glaucoma.     Hepatitis C Screening:  A blood test to see if you have the hepatitis C virus.  Hepatitis C attacks the liver and is a major cause of chronic liver disease.  Medicare will cover a single screening for all adults born between 1945 & 1965, or high risk patients (people who have injected illegal drugs or people who have had blood transfusions).  High risk patients who continue to inject illegal drugs can be screened for Hepatitis C every year.    Smoking and Tobacco-Use Cessation Counseling:  Tobacco is the single greatest cause of disease and early death in our country today. Medication and counseling together can increase a person’s chance of quitting for good.   Medicare covers two quitting attempts per year, with four counseling sessions per attempt (eight sessions in a 12 month period)    Preventive Screening tests for Women    Screening Mammograms and Breast Exams:  An x-ray of your breasts to check for breast cancer before you or your doctor may be able to feel it.  If breast cancer is found early it can usually be treated with success.    Pelvic Exams and Pap Tests:  An exam to check for cervical and vaginal cancer. A Pap test is a lab test in which cells are taken from your cervix and sent to the lab to look for signs of cervical cancer. If cancer of the cervix is found early, chances for a cure are good. Testing can generally end at age 65, or if a woman has a hysterectomy for a benign condition. Your provider may recommend more frequent testing if  slough off). You do not need to bandage the area unless a blister forms and breaks (which may be a few days). When the blister breaks, begin daily dressing changes as follows:  1) Clean and dry the area with tap water using clean Q-tip or sterile gauze pad.   2) Apply Polysporin ointment or Bacitracin ointment over entire wound. Do NOT use Neosporin ointment.   3) Cover the wound with a band-aid or sterile non-stick gauze pad and micropore paper tape.   REPEAT THESE INSTRUCTIONS AT LEAST ONCE A DAY UNTIL THE WOUND HAS COMPLETELY HEALED.   It is an old wives tale that a wound heals better when it is exposed to air and allowed to dry out. The wound will heal faster with a better cosmetic result if it is kept moist with ointment and covered with a bandage.   Do not let the wound dry out.   IMPORTANT INFORMATION ON REVERSE SIDE   Supplies Needed:   *Cotton tipped applicators (Q-tips)   *Polysporin ointment or Bacitracin ointment (NOT NEOSPORIN)   *Band-aids, or non stick gauze pads and micropore paper tape   PATIENT INFORMATION   During the healing process you will notice a number of changes. All wounds develop a small halo of redness surrounding the wound. This means healing is occurring. Severe itching with extensive redness usually indicates sensitivity to the ointment or bandage tape used to dress the wound. You should call our office if this develops.   Swelling and/or discoloration around your surgical site is common, particularly when performed around the eye.   All wounds normally drain. The larger the wound the more drainage there will be. After 7-10 days, you will notice the wound beginning to shrink and new skin will begin to grow. The wound is healed when you can see skin has formed over the entire area. A healed wound has a healthy, shiny look to the surface and is red to dark pink in color to normalize. Wounds may take approximately 4-6 weeks to heal. Larger wounds may take 6-8 weeks. After the wound is  certain abnormal results are found.    Bone Mass Measurements:  A painless x-ray of your bone density to see if you are at risk for a broken bone. Bone density refers to the thickness of bones or how tightly the bone tissue is packed.    Preventive Screening tests for Men    Prostate Screening:  Should you have a prostate cancer test (PSA)?  It is up to you to decide if you want a prostate cancer test. Talk to your clinician to find out if the test is right for you.  Things for you to consider and talk about should include:  Benefits and harms of the test  Your family history  How your race/ethnicity may influence the test  If the test may impact other medical conditions you have  Your values on screenings and treatments    *Medicare pays for many preventive services to keep you healthy. For some of these services, you might have to pay a deductible, coinsurance, and / or copayment.  The amounts vary depending on the type of services you need and the kind of Medicare health plan you have.    For further details on screenings offered by Medicare please visit: https://www.medicare.gov/coverage/preventive-screening-services      healed you may discontinue dressing changes.   You may experience a sensation of tightness as your wound heals. This is normal and will gradually subside.   Your healed wound may be sensitive to temperature changes. This sensitivity improves with time, but if you re having a lot of discomfort, try to avoid temperature extremes.   Patients frequently experience itching after their wound appears to have healed because of the continue healing under the skin. Plain Vaseline will help relieve the itching.

## 2024-09-03 ASSESSMENT — ANXIETY QUESTIONNAIRES
8. IF YOU CHECKED OFF ANY PROBLEMS, HOW DIFFICULT HAVE THESE MADE IT FOR YOU TO DO YOUR WORK, TAKE CARE OF THINGS AT HOME, OR GET ALONG WITH OTHER PEOPLE?: SOMEWHAT DIFFICULT
GAD7 TOTAL SCORE: 3
7. FEELING AFRAID AS IF SOMETHING AWFUL MIGHT HAPPEN: NOT AT ALL
GAD7 TOTAL SCORE: 3
GAD7 TOTAL SCORE: 3

## 2024-09-06 ENCOUNTER — VIRTUAL VISIT (OUTPATIENT)
Dept: FAMILY MEDICINE | Facility: CLINIC | Age: 69
End: 2024-09-06
Payer: COMMERCIAL

## 2024-09-06 DIAGNOSIS — F33.1 MODERATE RECURRENT MAJOR DEPRESSION (H): Primary | ICD-10-CM

## 2024-09-06 DIAGNOSIS — F41.9 ANXIETY: ICD-10-CM

## 2024-09-06 PROCEDURE — 99441 PR PHYSICIAN TELEPHONE EVALUATION 5-10 MIN: CPT | Mod: 93 | Performed by: NURSE PRACTITIONER

## 2024-09-06 ASSESSMENT — ANXIETY QUESTIONNAIRES
2. NOT BEING ABLE TO STOP OR CONTROL WORRYING: NOT AT ALL
IF YOU CHECKED OFF ANY PROBLEMS ON THIS QUESTIONNAIRE, HOW DIFFICULT HAVE THESE PROBLEMS MADE IT FOR YOU TO DO YOUR WORK, TAKE CARE OF THINGS AT HOME, OR GET ALONG WITH OTHER PEOPLE: NOT DIFFICULT AT ALL
5. BEING SO RESTLESS THAT IT IS HARD TO SIT STILL: NOT AT ALL
6. BECOMING EASILY ANNOYED OR IRRITABLE: NOT AT ALL
GAD7 TOTAL SCORE: 1
7. FEELING AFRAID AS IF SOMETHING AWFUL MIGHT HAPPEN: NOT AT ALL
1. FEELING NERVOUS, ANXIOUS, OR ON EDGE: NOT AT ALL
3. WORRYING TOO MUCH ABOUT DIFFERENT THINGS: NOT AT ALL

## 2024-09-06 ASSESSMENT — PATIENT HEALTH QUESTIONNAIRE - PHQ9
5. POOR APPETITE OR OVEREATING: SEVERAL DAYS
SUM OF ALL RESPONSES TO PHQ QUESTIONS 1-9: 4

## 2024-09-06 NOTE — PROGRESS NOTES
"Austin is a 68 year old who is being evaluated via a billable telephone visit.    What phone number would you like to be contacted at? [phone]   How would you like to obtain your AVS? Brandy  Originating Location (pt. Location): Home    Distant Location (provider location):  On-site    Assessment & Plan     Moderate recurrent major depression (H)  Anxiety  Noted improvement on Effexor  mg once daily.  Continue with current plan.       8/7/2024    10:22 AM 8/7/2024    11:55 AM 9/6/2024    12:48 PM   PHQ   PHQ-9 Total Score 15 15 4   Q9: Thoughts of better off dead/self-harm past 2 weeks More than half the days More than half the days Not at all   F/U: Thoughts of suicide or self-harm Yes Yes    F/U: Self harm-plan No No    F/U: Self-harm action No No    F/U: Safety concerns No No          8/7/2024    11:55 AM 9/3/2024    11:56 AM 9/6/2024    12:48 PM   ELEAZAR-7 SCORE   Total Score 10 (moderate anxiety) 3 (minimal anxiety)    Total Score 10 3 1           BMI  Estimated body mass index is 33.07 kg/m  as calculated from the following:    Height as of 8/7/24: 1.549 m (5' 1\").    Weight as of 8/7/24: 79.4 kg (175 lb).       Subjective   Austin is a 68 year old, presenting for the following health issues:  Recheck Medication        9/6/2024    12:45 PM   Additional Questions   Roomed by Milly     History of Present Illness       Mental Health Follow-up:  Patient presents to follow-up on Depression & Anxiety.Patient's depression since last visit has been:  Better  The patient is not having other symptoms associated with depression.  Patient's anxiety since last visit has been:  Better  The patient is not having other symptoms associated with anxiety.  Any significant life events: No  Patient is not feeling anxious or having panic attacks.  Patient has no concerns about alcohol or drug use.    She eats 0-1 servings of fruits and vegetables daily.She consumes 0 sweetened beverage(s) daily.She exercises with enough " effort to increase her heart rate 10 to 19 minutes per day.  She exercises with enough effort to increase her heart rate 3 or less days per week.   She is taking medications regularly.       Increased Effexor XR to 225 mg early August.    Patient reports feeling good.   The other day was starting to feel sad, but then told herself that she didn't have anything to be sad about and felt better really quickly.    Her friend  recently.   Hasn't needed to use Lorazepam since increase in Effexor.    [place].    Leaving for [place] in November, will be there for 1-2 weeks and then will travel to [place], [place].            2024    10:22 AM 2024    11:55 AM 2024    12:48 PM   PHQ   PHQ-9 Total Score 15 15 4   Q9: Thoughts of better off dead/self-harm past 2 weeks More than half the days More than half the days Not at all   F/U: Thoughts of suicide or self-harm Yes Yes    F/U: Self harm-plan No No    F/U: Self-harm action No No    F/U: Safety concerns No No           2024    11:55 AM 9/3/2024    11:56 AM 2024    12:48 PM   ELEAZAR-7 SCORE   Total Score 10 (moderate anxiety) 3 (minimal anxiety)    Total Score 10 3 1            Review of Systems  Constitutional, HEENT, cardiovascular, pulmonary, gi and gu systems are negative, except as otherwise noted.      Objective           Vitals:  No vitals were obtained today due to virtual visit.    Physical Exam   General: Alert and no distress //Respiratory: No audible wheeze, cough, or shortness of breath // Psychiatric:  Appropriate affect, tone, and pace of words        Phone call duration: 6 minutes    Signed Electronically by: TANYA Tavera CNP

## 2024-09-13 ENCOUNTER — TRANSFERRED RECORDS (OUTPATIENT)
Dept: HEALTH INFORMATION MANAGEMENT | Facility: CLINIC | Age: 69
End: 2024-09-13
Payer: COMMERCIAL

## 2024-09-16 ENCOUNTER — TRANSFERRED RECORDS (OUTPATIENT)
Dept: HEALTH INFORMATION MANAGEMENT | Facility: CLINIC | Age: 69
End: 2024-09-16
Payer: COMMERCIAL

## 2024-09-17 ENCOUNTER — VIRTUAL VISIT (OUTPATIENT)
Dept: FAMILY MEDICINE | Facility: CLINIC | Age: 69
End: 2024-09-17
Payer: COMMERCIAL

## 2024-09-17 DIAGNOSIS — I99.9 CIRCULATION PROBLEM: Primary | ICD-10-CM

## 2024-09-17 PROCEDURE — G2211 COMPLEX E/M VISIT ADD ON: HCPCS | Mod: 95 | Performed by: NURSE PRACTITIONER

## 2024-09-17 PROCEDURE — 99213 OFFICE O/P EST LOW 20 MIN: CPT | Mod: 95 | Performed by: NURSE PRACTITIONER

## 2024-09-17 RX ORDER — PREGABALIN 25 MG/1
CAPSULE ORAL
COMMUNITY
Start: 2024-09-16

## 2024-09-17 NOTE — PROGRESS NOTES
"Austin is a 68 year old who is being evaluated via a billable video visit.    How would you like to obtain your AVS? MyChart  If the video visit is dropped, the invitation should be resent by: Text to cell phone: [phone]  Will anyone else be joining your video visit? No    Assessment & Plan     Austin was seen today for referral.    Diagnoses and all orders for this visit:    Circulation problem, bilateral feet with color changes and cold sensation  Associated small fiber neuropathy.  Will plan further consultation with vascular specialist to further investigate circulation concern.    -     Vascular Medicine Referral; Future        BMI  Estimated body mass index is 33.07 kg/m  as calculated from the following:    Height as of 8/7/24: 1.549 m (5' 1\").    Weight as of 8/7/24: 79.4 kg (175 lb).       The longitudinal plan of care for the diagnosis(es)/condition(s) as documented were addressed during this visit. Due to the added complexity in care, I will continue to support Austin in the subsequent management and with ongoing continuity of care.  Subjective   Austin is a 68 year old, presenting for the following health issues:  Referral (For blood flow in legs. )        9/17/2024     4:48 PM   Additional Questions   Roomed by Vale RUIZ CMA     Video Start Time:  5:00 p.m.    History of Present Illness       Vascular Disease:  She presents for follow up of vascular disease.     She never takes nitroglycerin. She is not taking daily aspirin.    She eats 0-1 servings of fruits and vegetables daily.She consumes 0 sweetened beverage(s) daily.She exercises with enough effort to increase her heart rate 10 to 19 minutes per day.  She exercises with enough effort to increase her heart rate 3 or less days per week.   She is taking medications regularly.     Patient was seen by neurology for neuropathy concern yesterday.    Chronic history of bilateral toes turning blue with prolonged sitting.  Bilateral feet are " always cold.  Is wondering about blood flow.        Was seen by neurology, small fiber neuropathy.  Plan to start Lyrica, patient is waiting for this from the pharmacy.    Patient is considering medical cannabis, but will try Lyrica first.          Review of Systems  Constitutional, HEENT, cardiovascular, pulmonary, gi and gu systems are negative, except as otherwise noted.      Objective           Vitals:  No vitals were obtained today due to virtual visit.    Physical Exam   GENERAL: alert and no distress  PSYCH: Appropriate affect, tone, and pace of words          Video-Visit Details    Type of service:  Video Visit   Video End Time:5:12 PM  Originating Location (pt. Location): Home  Distant Location (provider location):  Off-site  Platform used for Video Visit: Tian    Signed Electronically by: TANYA Tavera CNP

## 2024-09-18 ENCOUNTER — TELEPHONE (OUTPATIENT)
Dept: OTHER | Facility: CLINIC | Age: 69
End: 2024-09-18

## 2024-09-18 NOTE — TELEPHONE ENCOUNTER
Referral received via [facility] on 9/18/24.    Pt referred to [facility] by Dunia Desai APRN CNP for Circulation problem     Routing to scheduling to coordinate the following:    NEW VASCULAR PATIENT consult with Vascular Medicine  Please schedule this at next available      Appt note:  Pt referred to [facility] by Dunia Desai APRN CNP for Circulation problem     Peggy HAYNES, RN    [facility]  Office: [phone]  Fax: [phone]

## 2024-10-08 ENCOUNTER — OFFICE VISIT (OUTPATIENT)
Dept: OTHER | Facility: CLINIC | Age: 69
End: 2024-10-08
Attending: INTERNAL MEDICINE
Payer: COMMERCIAL

## 2024-10-08 VITALS
WEIGHT: 177 LBS | HEART RATE: 81 BPM | DIASTOLIC BLOOD PRESSURE: 90 MMHG | BODY MASS INDEX: 33.44 KG/M2 | SYSTOLIC BLOOD PRESSURE: 136 MMHG | OXYGEN SATURATION: 97 %

## 2024-10-08 DIAGNOSIS — I73.00 RAYNAUD'S DISEASE WITHOUT GANGRENE: Primary | ICD-10-CM

## 2024-10-08 PROCEDURE — G2211 COMPLEX E/M VISIT ADD ON: HCPCS | Performed by: INTERNAL MEDICINE

## 2024-10-08 PROCEDURE — G0463 HOSPITAL OUTPT CLINIC VISIT: HCPCS | Performed by: INTERNAL MEDICINE

## 2024-10-08 PROCEDURE — 99205 OFFICE O/P NEW HI 60 MIN: CPT | Performed by: INTERNAL MEDICINE

## 2024-10-08 RX ORDER — AMLODIPINE BESYLATE 2.5 MG/1
2.5 TABLET ORAL DAILY
Qty: 30 TABLET | Refills: 5 | Status: SHIPPED | OUTPATIENT
Start: 2024-10-08

## 2024-10-08 NOTE — PROGRESS NOTES
[facility]        Patient is here for a consult to discuss circulation problem    Pt is currently taking no meds that would impact our treatment plan.    BP (!) 136/90 (BP Location: Left arm, Patient Position: Sitting, Cuff Size: Adult Regular)   Pulse 81   Wt 177 lb (80.3 kg)   LMP  (LMP Unknown)   SpO2 97%   BMI 33.44 kg/m      The provider has been notified that the patient has no concerns.     Questions patient would like addressed today are: N/A.    Refills are needed: N/A    Has homecare services and agency name:  Alycia Reid MA

## 2024-10-08 NOTE — PATIENT INSTRUCTIONS
Raynaud Phenomenon     What is Raynaud phenomenon?   Raynaud phenomenon (RP) is a name given to episodes of color changes (usually pale or blue) in the hands and feet in response to the cold or emotional stress. The episodes usually come on suddenly and last minutes to hours. At first it might affect only one finger or toe, but may, over time, spread to other fingers and toes. There is sometimes a clear line at which the color changes from normal to discolored. It may affect both hands equally and may cause numbness, tingling, or an aching pain.  Sometimes the arms or legs may get mottled, and it can also affect other areas of the body, such as the ears, nose, and face. Upon warming, the hands and feet usually become red or flushed and they may feel swollen or itchy.     Who gets it?   RP is fairly common, especially in regions with colder climates. It affects girls somewhat more often than boys.  It often runs in families.      Most people with RP do not have an underlying rheumatic disease and will not go on to develop one. RP can be associated with rheumatic diseases including lupus, systemic sclerosis, and mixed connective tissue disease (MCTD), however these patients will have additional signs and symptoms of those diseases.     What causes it?   Normally, blood vessels constrict in response to cold temperatures in order to keep the body warm. It is thought that in RP, there is over-activation of blood vessel constriction in response to cold (and stress and exercise). This constriction leads to limited blood supply to the skin resulting in paleness. The blue color results because the tissue in the hands and feet are extracting so much oxygen from the slow-flowing blood. The fingers and toes appear red when re-warmed because of exaggerated blood flow through the blood vessels. These episodes of decreased and increased blood flow are mainly a nuisance, and do not (with rare exceptions) damage the fingers or  toes.    How is it diagnosed?   RP is diagnosed based upon the story and exam by your doctor. There are no simple tests to help diagnose RP. Your doctor would ask questions to be sure there is no associated rheumatic disease, do a general physical examination, and carefully look at your nailfold capillaries with a magnifier in the office.  Abnormalities in the nailfold capillaries may indicate an underlying rheumatic disease. Lab testing, such as an DAYANARA, is not routinely necessary unless there are other reasons to suspect a rheumatic disease.     How is it treated?  Mild RP can be treated with simple measures, such as dressing warmly (for example wearing mittens, long underwear, and feet warmers), avoiding sudden cold exposures, minimizing emotional stress, and avoiding other aggravating factors (such as cigarette smoke, and stimulants such as decongestants and diet pills).     Children can learn through biofeedback training how to increase the blood flow to their fingers and toes and increase their skin temperatures. There are several medications that can be helpful, and the most commonly used medication is a calcium-channel blocker called nifedipine or amlodipine    Go for Labs next week order placed   Thick insulated socks   Video  Visit  in 5-6 weeks

## 2024-10-08 NOTE — PROGRESS NOTES
[facility] INITIAL VASCULAR MEDICINE CONSULT    ( New patient visit)     PRIMARY HEALTH CARE PROVIDER:  Dunia Desai, TANYA, CNP      REFERRING HEALTH CARE PROVIDER;  TANYA Elaine CNP      REASON FOR CONSULT: Evaluation and management of vascular causes of burning sensation of the feet and discoloration of the toes on the cold toes for many years      HPI: Austin Shaffer is a 68 year old very pleasant female with past medical history of depression, mild intermittent asthma dealing with bilateral foot pain that started several years ago as a burning sensation, and slowly with time has been worsening. Now it is becoming difficult for her to sleep at night because her feet will feel so hot and painful that she has to have her feet outside of the blanket, however, sometimes they can also be freezing cold. She is hypersensitive to walking on things such as rocks now. Also notices some symptoms when she is sitting such as driving. Laboratory work showed a vitamin D within normal limits, although on the lower side of normal. EMG of the bilateral lower extremities was completed on 09/13/2024 and within normal limits.   She also gives a history of all the toes turning into bluish, purplish and winter months or exposure to cold makes symptoms worse  Seen and evaluated by neurologist initiated pregabalin caused dizziness and stopped taking medication.  No history of a DVT or PE  No claudication symptoms  She spends winter months in [place]    PAST MEDICAL HISTORY  Past Medical History:   Diagnosis Date    Depressive disorder     Mild intermittent asthma     Nephritis     age 9       CURRENT MEDICATIONS  Current Outpatient Medications   Medication Sig Dispense Refill    albuterol (PROAIR HFA) 108 (90 Base) MCG/ACT inhaler Inhale 2 puffs into the lungs every 6 hours as needed for shortness of breath / dyspnea 2 Inhaler 1    amLODIPine (NORVASC) 2.5 MG tablet Take 1 tablet (2.5 mg) by  mouth daily. 30 tablet 5    calcium carbonate (OS-ZACH) 500 MG tablet Take 1 tablet (500 mg) by mouth 2 times daily 180 tablet 1    clobetasol (TEMOVATE) 0.05 % external ointment Apply to AA BID x 1-2 weeks then PRN only 60 g 3    folic acid (FOLVITE) 1 MG tablet Take 1 mg by mouth daily      hydrOXYzine HCl (ATARAX) 10 MG tablet Take 1-2 tablets (10-20 mg) by mouth nightly as needed for other (insomnia) 90 tablet 5    LORazepam (ATIVAN) 0.5 MG tablet TAKE 1/2 TO 1 (ONE-HALF TO ONE) TABLET BY MOUTH EVERY 8 HOURS AS NEEDED FOR ANXIETY 30 tablet 0    Omega-3 Fish Oil 500 MG capsule Take 1 capsule (500 mg) by mouth 2 times daily 180 capsule 3    omeprazole (PRILOSEC) 20 MG DR capsule Take 1 capsule (20 mg) by mouth daily 90 capsule 3    pregabalin (LYRICA) 25 MG capsule 25 mg at bedtime x 1 week, 25 mg twice daily x 1 week, 25 mg AM, 50 mg PM x 1 week, 50 mg twice daily x 1 week, 50 mg AM, 75 mg PM x 1 week, 75 mg twice daily thereafter      Probiotic Product (PROBIOTIC DAILY) CAPS Take 1 capsule by mouth daily 90 capsule 3    venlafaxine (EFFEXOR XR) 150 MG 24 hr capsule Take 1 capsule (150 mg) by mouth daily (Total daily dose: 225 mg) 90 capsule 3    venlafaxine (EFFEXOR XR) 75 MG 24 hr capsule Take 1 capsule (75 mg) by mouth daily (Total daily dose:  225 mg) 90 capsule 3    Vitamin D3 (CHOLECALCIFEROL) 25 mcg (1000 units) tablet Take 1 tablet (25 mcg) by mouth daily 90 tablet 3     No current facility-administered medications for this visit.       PAST SURGICAL HISTORY:  Past Surgical History:   Procedure Laterality Date    ABDOMEN SURGERY      ? Enteritis as a child, possibly appendix    APPENDECTOMY      BIOPSY  10/05/21    COLONOSCOPY      Cant remember the date    GENITOURINARY SURGERY      Cant remember the date    HYSTERECTOMY TOTAL ABDOMINAL, BILATERAL SALPINGO-OOPHORECTOMY, COMBINED  1998    OPEN REDUCTION INTERNAL FIXATION WRIST Left 03/31/2021    Left distal radius ORIF with Synthes volar plate (narrow  plate with 3 proximal screw hole plate), Dr. Fahad Longoria, [facility]    TONSILLECTOMY         ALLERGIES     Allergies   Allergen Reactions    Sulfa Antibiotics     Gluten Meal     Morphine Difficulty breathing       FAMILY HISTORY  Family History   Problem Relation Age of Onset    Cardiovascular Mother     Hypertension Mother     Depression Mother     Cardiovascular Maternal Grandmother     Cancer Father         Kidney    Other Cancer Father     Myocardial Infarction Brother 60        2017    Cerebrovascular Disease Brother 63        2017    Mental Illness Brother     Diabetes Brother     Myocardial Infarction Brother     Hypertension Brother         Had bad stoke 2015    Cerebrovascular Disease Brother     Asthma Brother     Diabetes Brother         Passed away at 60 Heart attack    Hypertension Brother     Other Cancer Maternal Grandfather            SOCIAL HISTORY  Social History     Socioeconomic History    Marital status:      Spouse name: Not on file    Number of children: Not on file    Years of education: Not on file    Highest education level: Not on file   Occupational History    Not on file   Tobacco Use    Smoking status: Former     Current packs/day: 0.00     Average packs/day: 1 pack/day for 5.0 years (5.0 ttl pk-yrs)     Types: Cigarettes     Start date: 1969     Quit date: 3/20/1974     Years since quittin.5    Smokeless tobacco: Never   Vaping Use    Vaping status: Never Used   Substance and Sexual Activity    Alcohol use: Yes     Comment: About 3-4 a mounth    Drug use: No    Sexual activity: Yes     Partners: Male     Birth control/protection: Male Surgical, Female Surgical   Other Topics Concern    Parent/sibling w/ CABG, MI or angioplasty before 65F 55M? No     Service Not Asked    Blood Transfusions No    Caffeine Concern No    Occupational Exposure Not Asked    Hobby Hazards Not Asked    Sleep Concern No    Stress Concern Yes     Comment: Aging mother    Weight  Concern Not Asked    Special Diet Not Asked    Back Care Not Asked    Exercise Not Asked    Bike Helmet Not Asked    Seat Belt Yes    Self-Exams Not Asked   Social History Narrative    Retired     Social Determinants of Health     Financial Resource Strain: Low Risk  (8/7/2024)    Financial Resource Strain     Within the past 12 months, have you or your family members you live with been unable to get utilities (heat, electricity) when it was really needed?: No   Food Insecurity: Low Risk  (8/7/2024)    Food Insecurity     Within the past 12 months, did you worry that your food would run out before you got money to buy more?: No     Within the past 12 months, did the food you bought just not last and you didn t have money to get more?: No   Transportation Needs: Low Risk  (8/7/2024)    Transportation Needs     Within the past 12 months, has lack of transportation kept you from medical appointments, getting your medicines, non-medical meetings or appointments, work, or from getting things that you need?: No   Physical Activity: Unknown (8/7/2024)    Exercise Vital Sign     Days of Exercise per Week: 1 day     Minutes of Exercise per Session: Not on file   Stress: Stress Concern Present (8/7/2024)    [employer] - Occupational Stress Questionnaire     Feeling of Stress : Rather much   Social Connections: Unknown (8/7/2024)    Social Connection and Isolation Panel [NHANES]     Frequency of Communication with Friends and Family: Not on file     Frequency of Social Gatherings with Friends and Family: Once a week     Attends Jehovah's witness Services: Not on file     Active Member of Clubs or Organizations: Not on file     Attends Club or Organization Meetings: Not on file     Marital Status: Not on file   Interpersonal Safety: Low Risk  (8/7/2024)    Interpersonal Safety     Do you feel physically and emotionally safe where you currently live?: Yes     Within the past 12 months, have you been hit,  slapped, kicked or otherwise physically hurt by someone?: No     Within the past 12 months, have you been humiliated or emotionally abused in other ways by your partner or ex-partner?: No   Housing Stability: Low Risk  (8/7/2024)    Housing Stability     Do you have housing? : Yes     Are you worried about losing your housing?: No       ROS:   General: No change in weight, sleep or appetite.  Normal energy.  No fever or chills  Eyes: Negative for vision changes or eye problems  ENT: No problems with ears, nose or throat.  No difficulty swallowing.  Resp: No coughing, wheezing or shortness of breath  CV: No chest pains or palpitations  GI: No nausea, vomiting,  heartburn, abdominal pain, diarrhea, constipation or change in bowel habits  : No urinary frequency or dysuria, bladder or kidney problems  Musculoskeletal: No significant muscle or joint pains  Neurologic: No headaches, numbness, tingling, weakness, problems with balance or coordination  Psychiatric: History of depression stable with meds  Heme/immune/allergy: No history of bleeding or clotting problems or anemia.  No allergies or immune system problems  Endocrine: No history of thyroid disease, diabetes or other endocrine disorders  Skin: No rashes,worrisome lesions or skin problems  Vascular:  No claudication, lifestyle limiting or otherwise; no ischemic rest pain; no non-healing ulcers. No weakness, No loss of sensation    Bilateral lower extremity burning sensation, discoloration of toes turning into bluish, purplish worse with exposure to the cold and winter months    EXAM:  BP (!) 136/90 (BP Location: Left arm, Patient Position: Sitting, Cuff Size: Adult Regular)   Pulse 81   Wt 177 lb (80.3 kg)   LMP  (LMP Unknown)   SpO2 97%   BMI 33.44 kg/m    In general, the patient is a pleasant female in no apparent distress.    HEENT: NC/AT.  PERRLA.  EOMI.    Neck: No adenopathy.  No thyromegaly. Carotids +2/2 bilaterally without bruits.  No jugular  venous distension.   Heart: RRR. Normal S1, S2   Lungs: CTA.  No ronchi, wheezes, rales.  No dullness to percussion.   Abdomen: Soft, nontender, nondistended. No organomegaly. No AAA.  No bruits.   Extremities: No evidence of arterial or venous insufficiency good palpable peripheral pulses  Motor or sensory function is normal  Toes are cold with slight discoloration and abnormal capillary response      Labs:  LIPID RESULTS:  Lab Results   Component Value Date    CHOL 224 (H) 08/07/2024    CHOL 251 (H) 10/28/2020    HDL 53 08/07/2024    HDL 53 10/28/2020     (H) 08/07/2024     (H) 10/28/2020    TRIG 116 08/07/2024    TRIG 134 10/28/2020    CHOLHDLRATIO 3.5 06/19/2015       LIVER ENZYME RESULTS:  Lab Results   Component Value Date    AST 23 08/07/2024    AST 20 06/17/2021    ALT 11 08/07/2024    ALT 25 06/17/2021       CBC RESULTS:  Lab Results   Component Value Date    WBC 6.0 08/07/2024    WBC 6.2 06/17/2021    RBC 4.78 08/07/2024    RBC 4.62 06/17/2021    HGB 14.3 08/07/2024    HGB 13.4 06/17/2021    HCT 43.8 08/07/2024    HCT 40.7 06/17/2021    MCV 92 08/07/2024    MCV 88 06/17/2021    MCH 29.9 08/07/2024    MCH 29.0 06/17/2021    MCHC 32.6 08/07/2024    MCHC 32.9 06/17/2021    RDW 12.4 08/07/2024    RDW 12.9 06/17/2021     08/07/2024     06/17/2021       BMP RESULTS:  Lab Results   Component Value Date     08/07/2024     06/17/2021    POTASSIUM 5.0 08/07/2024    POTASSIUM 5.2 11/08/2022    POTASSIUM 4.2 06/17/2021    CHLORIDE 102 08/07/2024    CHLORIDE 107 11/08/2022    CHLORIDE 105 06/17/2021    CO2 25 08/07/2024    CO2 26 11/08/2022    CO2 29 06/17/2021    ANIONGAP 10 08/07/2024    ANIONGAP 6 11/08/2022    ANIONGAP 5 06/17/2021     (H) 08/07/2024     (H) 11/08/2022     (H) 06/17/2021    BUN 7.4 (L) 08/07/2024    BUN 11 11/08/2022    BUN 8 06/17/2021    CR 0.80 08/07/2024    CR 0.82 06/17/2021    GFRESTIMATED 79 08/07/2024    GFRESTIMATED 74 06/17/2021     GFRESTBLFABIANA 86 06/17/2021    ZACH 9.6 08/07/2024    ZACH 9.5 06/17/2021        A1C RESULTS:  Lab Results   Component Value Date    A1C 5.5 06/05/2023       THYROID RESULTS:  Lab Results   Component Value Date    TSH 0.91 06/05/2023    TSH 1.62 06/17/2021       Procedures:    EMG normal 9/2024 done at [facility] , MMA, vitamin D and other neurological labs are normal      Assessment and Plan:     1. Raynaud's disease without gangrene      This is a very pleasant 68-year-old female dealing with burning sensation of the feet, cold feet with discoloration turning into bluish, purplish and reddish worse with exposure to cold and winter months.  She generally spends phipps in [place].  She recently underwent extensive neurological evaluation including the EMG which was unremarkable all the laboratory tests were unremarkable.  She was prescribed pregabalin which caused dizziness she stopped taking it.  She also has a aches and pains.   Her diastolic blood pressure is slightly elevated today  Her history, exam consistent with Raynaud's  I will obtain DAYANARA, rheumatoid factor, ESR and CRP  Initiate low-dose amlodipine 2.5 mg daily  Thermal protection suggested  Education sheet on Raynaud's given    Virtual visit in 5 to 6 weeks    - Anti Nuclear Fide IgG by IFA with Reflex; Future  - Rheumatoid factor; Future  - Erythrocyte sedimentation rate auto; Future  - CRP inflammation; Future  - amLODIPine (NORVASC) 2.5 MG tablet; Take 1 tablet (2.5 mg) by mouth daily.  Dispense: 30 tablet; Refill: 5       60 minutes spent on the date of the encounter doing chart review, history and exam, documentation, and further activities as noted above.    The longitudinal care of plan for the above diagnoses was addressed during this visit. Due to added complexity of care, we will continue to supprt Austin Shaffer and the subsequent management of this/these conditions and with ongoing continuity of care for this/these conditions.     Thank  you for the consultation  This note was dictated by utilizing Dragon software  Copy of this note to primary care provider      Barbie Marie MD,FAHA,FSVM,FNLA, FACP  Vascular Medicine  Clinical Hypertension Specialist   Clinical Lipidologist

## 2024-10-09 ENCOUNTER — TELEPHONE (OUTPATIENT)
Dept: OTHER | Facility: CLINIC | Age: 69
End: 2024-10-09
Payer: COMMERCIAL

## 2024-10-09 DIAGNOSIS — I73.00 RAYNAUD'S DISEASE WITHOUT GANGRENE: Primary | ICD-10-CM

## 2024-10-09 NOTE — TELEPHONE ENCOUNTER
Left voicemail for patient to call back to schedule appointment(s), provided telephone number for patient to call back to schedule.    Virtual follow up with Dr. Marie  Non-fasting labs  Please schedule this in 6 weeks     Appt note: Follow up to 10/8/24

## 2024-10-09 NOTE — TELEPHONE ENCOUNTER
Routing to scheduling to coordinate the following:    Virtual follow up with Dr. Marie  Non-fasting labs  Please schedule this in 6 weeks     Appt note: Follow up to 10/8/24    Peggy HAYNES, RN    [facility]  Office: [phone]  Fax: [phone]

## 2024-10-11 NOTE — TELEPHONE ENCOUNTER
"Labs scheduled 10/28/24 and follow up with Dr. Marie scheduled 10/30/24. Scheduled sooner than 6 weeks as patient \"snowbirds\" and will be out of the state after 11/1/24.   "

## 2024-10-12 ASSESSMENT — ANXIETY QUESTIONNAIRES: GAD7 TOTAL SCORE: 3

## 2024-10-28 ENCOUNTER — LAB (OUTPATIENT)
Dept: LAB | Facility: CLINIC | Age: 69
End: 2024-10-28
Payer: COMMERCIAL

## 2024-10-28 DIAGNOSIS — I73.00 RAYNAUD'S DISEASE WITHOUT GANGRENE: ICD-10-CM

## 2024-10-28 LAB
CRP SERPL-MCNC: 8.5 MG/L
ERYTHROCYTE [SEDIMENTATION RATE] IN BLOOD BY WESTERGREN METHOD: 14 MM/HR (ref 0–30)
RHEUMATOID FACT SERPL-ACNC: <10 IU/ML

## 2024-10-28 PROCEDURE — 36415 COLL VENOUS BLD VENIPUNCTURE: CPT

## 2024-10-28 PROCEDURE — 86038 ANTINUCLEAR ANTIBODIES: CPT

## 2024-10-28 PROCEDURE — 86431 RHEUMATOID FACTOR QUANT: CPT

## 2024-10-28 PROCEDURE — 86140 C-REACTIVE PROTEIN: CPT

## 2024-10-28 PROCEDURE — 85652 RBC SED RATE AUTOMATED: CPT

## 2024-10-29 LAB — ANA SER QL IF: NEGATIVE

## 2024-10-30 ENCOUNTER — VIRTUAL VISIT (OUTPATIENT)
Dept: OTHER | Facility: CLINIC | Age: 69
End: 2024-10-30
Attending: INTERNAL MEDICINE
Payer: COMMERCIAL

## 2024-10-30 DIAGNOSIS — I73.00 RAYNAUD'S DISEASE WITHOUT GANGRENE: ICD-10-CM

## 2024-10-30 PROCEDURE — 99443 PR PHYSICIAN TELEPHONE EVALUATION 21-30 MIN: CPT | Mod: 93 | Performed by: INTERNAL MEDICINE

## 2024-10-30 RX ORDER — AMLODIPINE BESYLATE 5 MG/1
5 TABLET ORAL DAILY
Qty: 90 TABLET | Refills: 3 | Status: SHIPPED | OUTPATIENT
Start: 2024-10-30

## 2024-10-30 NOTE — PROGRESS NOTES
Austin is a 68 year old who is being evaluated via a billable telephone visit.    What phone number would you like to be contacted at? [phone]  How would you like to obtain your AVS? Maribelhart  Originating Location (pt. Location): Home    Distant Location (provider location):  On-site      Cassie Reid MA      Provider visit note:  Follow-up visit  Review of recent labs  Recently treated with low-dose amlodipine for Raynaud's    Austin Shaffer is a 68 year old very pleasant female with past medical history of depression, mild intermittent asthma dealing with bilateral foot pain that started several years ago as a burning sensation, and slowly with time has been worsening. Now it is becoming difficult for her to sleep at night because her feet will feel so hot and painful that she has to have her feet outside of the blanket, however, sometimes they can also be freezing cold. She is hypersensitive to walking on things such as rocks now. Also notices some symptoms when she is sitting such as driving. Laboratory work showed a vitamin D within normal limits, although on the lower side of normal. EMG of the bilateral lower extremities was completed on 09/13/2024 and within normal limits.   She also gives a history of all the toes turning into bluish, purplish and winter months or exposure to cold makes symptoms worse  Seen and evaluated by neurologist initiated pregabalin caused dizziness and stopped taking medication.  No history of a DVT or PE  No claudication symptoms  She spends winter months in [place]    Review of systems: Reviewed all 12 point review of systems as per HPI otherwise unremarkable    Physical exam:( no physical exam done this is virtual visit)    Reviewed recent laboratory tests, imaging studies in the epic and updated chart    Assessment and plan:    1. Raynaud's disease without gangrene        This is a very pleasant 68-year-old female dealing with burning sensation of the feet, cold feet  with discoloration turning into bluish, purplish and reddish worse with exposure to cold and winter months.  She generally spends phipps in [place].  She recently underwent extensive neurological evaluation including the EMG which was unremarkable all the laboratory tests were unremarkable.  She was prescribed pregabalin which caused dizziness she stopped taking it.  She also has a aches and pains.   Her diastolic blood pressure is slightly elevated last visit  Her history, exam consistent with Raynaud's  Recent inflammatory markers, DAYANARA, rheumatoid factor, ESR and CRP are normal  Initiated low-dose amlodipine 2.5 mg daily, with minimal improvement but tolerating medication and no ankle swelling or leg swelling  Next week she is going to [place]  Increase the dose of amlodipine to 5 mg daily new prescription sent watch for any ankle swelling  Utilize compression stockings during the daytime and elevate the legs  Thermal protection suggested  Education sheet on Raynaud's given     Follow-up in a year or sooner if any problems    Phone visit start time: 10:40 AM  Phone visit end time: 11:01 AM  Location the patient :at her home   location of provider: [facility]    21 minutes spent on the date of the encounter doing chart review, review of recent labs, previous evaluation, history, documentation and addressed above-mentioned issues.  New prescription sent AVS with written instructions done.  She had a lot of questions all of them were answered             This visit is being conducted as a virtual visit due to the emphasis on mitigation of the COVID-19 virus pandemic. The clinician has decided that the risk of an in-office visit outweighs the benefit for this patient.       Barbie Marie MD,TAMRA,FSVM,FNLA, FACP  Vascular Medicine  Clinical Hypertension Specialist   Clinical Lipidologist

## 2024-10-30 NOTE — PATIENT INSTRUCTIONS
All of your recent labs looks good     Increase amlodipine dose to 5 mg daily , new Rx sent     Use compression stockings knee high 20-30 mm hg day time and elevate legs     Follow up in one year

## 2024-11-05 ENCOUNTER — TRANSFERRED RECORDS (OUTPATIENT)
Dept: HEALTH INFORMATION MANAGEMENT | Facility: CLINIC | Age: 69
End: 2024-11-05
Payer: COMMERCIAL

## 2024-11-09 ASSESSMENT — PATIENT HEALTH QUESTIONNAIRE - PHQ9: SUM OF ALL RESPONSES TO PHQ QUESTIONS 1-9: 11

## 2025-04-08 ASSESSMENT — ASTHMA QUESTIONNAIRES
QUESTION_5 LAST FOUR WEEKS HOW WOULD YOU RATE YOUR ASTHMA CONTROL: WELL CONTROLLED
ACT_TOTALSCORE: 23
QUESTION_2 LAST FOUR WEEKS HOW OFTEN HAVE YOU HAD SHORTNESS OF BREATH: NOT AT ALL
QUESTION_4 LAST FOUR WEEKS HOW OFTEN HAVE YOU USED YOUR RESCUE INHALER OR NEBULIZER MEDICATION (SUCH AS ALBUTEROL): NOT AT ALL
QUESTION_3 LAST FOUR WEEKS HOW OFTEN DID YOUR ASTHMA SYMPTOMS (WHEEZING, COUGHING, SHORTNESS OF BREATH, CHEST TIGHTNESS OR PAIN) WAKE YOU UP AT NIGHT OR EARLIER THAN USUAL IN THE MORNING: NOT AT ALL
QUESTION_1 LAST FOUR WEEKS HOW MUCH OF THE TIME DID YOUR ASTHMA KEEP YOU FROM GETTING AS MUCH DONE AT WORK, SCHOOL OR AT HOME: A LITTLE OF THE TIME

## 2025-04-08 ASSESSMENT — PATIENT HEALTH QUESTIONNAIRE - PHQ9
10. IF YOU CHECKED OFF ANY PROBLEMS, HOW DIFFICULT HAVE THESE PROBLEMS MADE IT FOR YOU TO DO YOUR WORK, TAKE CARE OF THINGS AT HOME, OR GET ALONG WITH OTHER PEOPLE: SOMEWHAT DIFFICULT
SUM OF ALL RESPONSES TO PHQ QUESTIONS 1-9: 11
SUM OF ALL RESPONSES TO PHQ QUESTIONS 1-9: 11

## 2025-04-09 ENCOUNTER — VIRTUAL VISIT (OUTPATIENT)
Dept: FAMILY MEDICINE | Facility: CLINIC | Age: 70
End: 2025-04-09
Payer: COMMERCIAL

## 2025-04-09 DIAGNOSIS — M65.331 TRIGGER MIDDLE FINGER OF RIGHT HAND: Primary | ICD-10-CM

## 2025-04-09 PROCEDURE — 98005 SYNCH AUDIO-VIDEO EST LOW 20: CPT | Performed by: NURSE PRACTITIONER

## 2025-04-09 RX ORDER — GABAPENTIN 300 MG/1
1 CAPSULE ORAL AT BEDTIME
COMMUNITY
Start: 2024-11-05

## 2025-04-15 ENCOUNTER — OFFICE VISIT (OUTPATIENT)
Dept: ORTHOPEDICS | Facility: CLINIC | Age: 70
End: 2025-04-15
Attending: NURSE PRACTITIONER
Payer: COMMERCIAL

## 2025-04-15 DIAGNOSIS — M65.331 TRIGGER MIDDLE FINGER OF RIGHT HAND: ICD-10-CM

## 2025-04-15 NOTE — PATIENT INSTRUCTIONS
Thank you for choosing [facility]    Dr. Bernal Locations:    [facility] [address], [address]  [address] [address]   Appointments: [phone] Appointments: [phone]   Fax: [phone] Fax: [phone]       Please call Surgical Scheduling at [phone] to schedule your surgery appointment.

## 2025-04-15 NOTE — LETTER
4/15/2025      Austin Shaffer  [address] [facility] [address]      Dear Colleague,    Thank you for referring your patient, Austin Shaffer, to the [facility] [place]. Please see a copy of my visit note below.    Orthopaedic Surgery Hand and Upper Extremity Clinic H&P Note:  Date: Apr 15, 2025     Patient Name: Austin Shaffer  MRN: [id number]    Consult requested by: Dunia Desai    CHIEF COMPLAINT: trigger finger, right middle finger    Dominant Hand: right  Occupation: retired      HPI:  Ms. Austin Shaffer is a 69 year old  female right hand dominant who presents with trigger finger of right middle finger. Her symptoms have been ongoing 1 year with no known injury or trauma, and have been worsening. She describes constant pain and intermittent sticking. The sticking worsens with tasks such as holding a coffee cup and doing dishes.     Treatment to date includes: splinting/ bracing, taking OTCs, and using IcyHot topical.     Of note, patient has a history of neuropathy in her hands.      PMH  Diabetes: no  Thyroid Problems: no  Smoking: no      PAST MEDICAL HISTORY:  Past Medical History:   Diagnosis Date     Depressive disorder      Mild intermittent asthma      Nephritis     age 9       PAST SURGICAL HISTORY:  Past Surgical History:   Procedure Laterality Date     ABDOMEN SURGERY      ? Enteritis as a child, possibly appendix     APPENDECTOMY       BIOPSY  10/05/21     COLONOSCOPY      Cant remember the date     GENITOURINARY SURGERY      Cant remember the date     HYSTERECTOMY TOTAL ABDOMINAL, BILATERAL SALPINGO-OOPHORECTOMY, COMBINED  1998     OPEN REDUCTION INTERNAL FIXATION WRIST Left 03/31/2021    Left distal radius ORIF with Synthes volar plate (narrow plate with 3 proximal screw hole plate), Dr. Fahad Longoria, [facility]     TONSILLECTOMY         MEDICATIONS:  Current Outpatient Medications   Medication Sig Dispense Refill     albuterol (PROAIR  HFA) 108 (90 Base) MCG/ACT inhaler Inhale 2 puffs into the lungs every 6 hours as needed for shortness of breath / dyspnea 2 Inhaler 1     amLODIPine (NORVASC) 2.5 MG tablet Take 1 tablet (2.5 mg) by mouth daily. (Patient not taking: Reported on 4/9/2025) 30 tablet 5     amLODIPine (NORVASC) 5 MG tablet Take 1 tablet (5 mg) by mouth daily. 90 tablet 3     calcium carbonate (OS-ZACH) 500 MG tablet Take 1 tablet (500 mg) by mouth 2 times daily 180 tablet 1     clobetasol (TEMOVATE) 0.05 % external ointment Apply to AA BID x 1-2 weeks then PRN only (Patient not taking: Reported on 4/9/2025) 60 g 3     folic acid (FOLVITE) 1 MG tablet Take 1 mg by mouth daily       gabapentin (NEURONTIN) 300 MG capsule Take 1 capsule by mouth at bedtime.       hydrOXYzine HCl (ATARAX) 10 MG tablet Take 1-2 tablets (10-20 mg) by mouth nightly as needed for other (insomnia) 90 tablet 5     LORazepam (ATIVAN) 0.5 MG tablet TAKE 1/2 TO 1 (ONE-HALF TO ONE) TABLET BY MOUTH EVERY 8 HOURS AS NEEDED FOR ANXIETY (Patient taking differently: as needed. TAKE 1/2 TO 1 (ONE-HALF TO ONE) TABLET BY MOUTH EVERY 8 HOURS AS NEEDED FOR ANXIETY) 30 tablet 0     Omega-3 Fish Oil 500 MG capsule Take 1 capsule (500 mg) by mouth 2 times daily 180 capsule 3     omeprazole (PRILOSEC) 20 MG DR capsule Take 1 capsule (20 mg) by mouth daily 90 capsule 3     pregabalin (LYRICA) 25 MG capsule 25 mg at bedtime x 1 week, 25 mg twice daily x 1 week, 25 mg AM, 50 mg PM x 1 week, 50 mg twice daily x 1 week, 50 mg AM, 75 mg PM x 1 week, 75 mg twice daily thereafter       Probiotic Product (PROBIOTIC DAILY) CAPS Take 1 capsule by mouth daily 90 capsule 3     venlafaxine (EFFEXOR XR) 150 MG 24 hr capsule Take 1 capsule (150 mg) by mouth daily (Total daily dose: 225 mg) 90 capsule 3     venlafaxine (EFFEXOR XR) 75 MG 24 hr capsule Take 1 capsule (75 mg) by mouth daily (Total daily dose:  225 mg) 90 capsule 3     Vitamin D3 (CHOLECALCIFEROL) 25 mcg (1000 units) tablet Take 1  tablet (25 mcg) by mouth daily 90 tablet 3     No current facility-administered medications for this visit.       ALLERGIES:     Allergies   Allergen Reactions     Sulfa Antibiotics      Gluten Meal      Morphine Difficulty breathing       FAMILY HISTORY:  No pertinent family history    SOCIAL HISTORY:  Social History     Tobacco Use     Smoking status: Former     Current packs/day: 0.00     Average packs/day: 1 pack/day for 5.0 years (5.0 ttl pk-yrs)     Types: Cigarettes     Start date: 1969     Quit date: 3/20/1974     Years since quittin.1     Passive exposure: Past     Smokeless tobacco: Never   Vaping Use     Vaping status: Never Used   Substance Use Topics     Alcohol use: Yes     Comment: About 3-4 a mounth     Drug use: No       The patient's past medical, family, and social history was reviewed and confirmed.    REVIEW OF SYMPTOMS:      General: Negative   Eyes: Negative   Ear, Nose and Throat: Negative   Respiratory: Negative   Cardiovascular: Negative   Gastrointestinal: Negative   Genito-urinary: Negative   Musculoskeletal: see HPI  Neurological: Negative   Psychological: Negative  HEME: Negative   ENDO: Negative   SKIN: Negative    VITALS:  There were no vitals filed for this visit.    EXAM:  General: NAD, A&Ox3  HEENT: NC/AT  CV: RRR by peripheral pulse  Pulmonary: Non-labored breathing on RA  RUE:  Severe triggering of the right middle finger with audible and palpable crepitus when manually unlocking  Tenderness palpation of the A1 pulley  Mild PIP joint flexion contracture  Intact FDP, FDS, EDC  Sensation intact light touch all distributions  WWP, cap refill less than 2 seconds    IMAGING:    None    I have personally reviewed the above images and labs.         IMPRESSION AND RECOMMENDATIONS:  Ms. Austin Shaffer is a 69 year old female right hand dominant with right middle trigger finger    We discussed treatment options including steroid injections and surgery.  Given the severity of  triggering, I think that she will achieve a full improvement with steroid injections.  The patient also wishes to proceed with surgery.    The indications for surgery were discussed with the patient. The benefits, risks, and alternatives of operative management were discussed in detail with the patient. The patient understands that the risks of surgery include, but are not limited to: infection, bleeding, injury to nearby structures (such as nerves, blood vessels, and tendons), wound healing problems, need for additional surgery, pain, stiffness, scarring, need for rehabilitation, and anesthetic complications.  Patient expressed understanding and elected to proceed with surgery. All questions were answered to the patient's satisfaction.    Case request placed, local only.    Franklyn Bernal MD    Hand, Upper Extremity & Microvascular Surgery  Department of Orthopaedic Surgery  [facility]          Again, thank you for allowing me to participate in the care of your patient.        Sincerely,        Franklyn Bernal MD    Electronically signed

## 2025-04-15 NOTE — PROGRESS NOTES
Orthopaedic Surgery Hand and Upper Extremity Clinic H&P Note:  Date: Apr 15, 2025     Patient Name: Austin Shaffer  MRN: [id number]    Consult requested by: Dunia Desai    CHIEF COMPLAINT: trigger finger, right middle finger    Dominant Hand: right  Occupation: retired      HPI:  Ms. Austin Shaffer is a 69 year old  female right hand dominant who presents with trigger finger of right middle finger. Her symptoms have been ongoing 1 year with no known injury or trauma, and have been worsening. She describes constant pain and intermittent sticking. The sticking worsens with tasks such as holding a coffee cup and doing dishes.     Treatment to date includes: splinting/ bracing, taking OTCs, and using IcyHot topical.     Of note, patient has a history of neuropathy in her hands.      PMH  Diabetes: no  Thyroid Problems: no  Smoking: no      PAST MEDICAL HISTORY:  Past Medical History:   Diagnosis Date    Depressive disorder     Mild intermittent asthma     Nephritis     age 9       PAST SURGICAL HISTORY:  Past Surgical History:   Procedure Laterality Date    ABDOMEN SURGERY      ? Enteritis as a child, possibly appendix    APPENDECTOMY      BIOPSY  10/05/21    COLONOSCOPY      Cant remember the date    GENITOURINARY SURGERY      Cant remember the date    HYSTERECTOMY TOTAL ABDOMINAL, BILATERAL SALPINGO-OOPHORECTOMY, COMBINED  1998    OPEN REDUCTION INTERNAL FIXATION WRIST Left 03/31/2021    Left distal radius ORIF with Synthes volar plate (narrow plate with 3 proximal screw hole plate), Dr. Fahad Longoria, [facility]    TONSILLECTOMY         MEDICATIONS:  Current Outpatient Medications   Medication Sig Dispense Refill    albuterol (PROAIR HFA) 108 (90 Base) MCG/ACT inhaler Inhale 2 puffs into the lungs every 6 hours as needed for shortness of breath / dyspnea 2 Inhaler 1    amLODIPine (NORVASC) 2.5 MG tablet Take 1 tablet (2.5 mg) by mouth daily. (Patient not taking: Reported on 4/9/2025) 30 tablet 5     amLODIPine (NORVASC) 5 MG tablet Take 1 tablet (5 mg) by mouth daily. 90 tablet 3    calcium carbonate (OS-ZACH) 500 MG tablet Take 1 tablet (500 mg) by mouth 2 times daily 180 tablet 1    clobetasol (TEMOVATE) 0.05 % external ointment Apply to AA BID x 1-2 weeks then PRN only (Patient not taking: Reported on 4/9/2025) 60 g 3    folic acid (FOLVITE) 1 MG tablet Take 1 mg by mouth daily      gabapentin (NEURONTIN) 300 MG capsule Take 1 capsule by mouth at bedtime.      hydrOXYzine HCl (ATARAX) 10 MG tablet Take 1-2 tablets (10-20 mg) by mouth nightly as needed for other (insomnia) 90 tablet 5    LORazepam (ATIVAN) 0.5 MG tablet TAKE 1/2 TO 1 (ONE-HALF TO ONE) TABLET BY MOUTH EVERY 8 HOURS AS NEEDED FOR ANXIETY (Patient taking differently: as needed. TAKE 1/2 TO 1 (ONE-HALF TO ONE) TABLET BY MOUTH EVERY 8 HOURS AS NEEDED FOR ANXIETY) 30 tablet 0    Omega-3 Fish Oil 500 MG capsule Take 1 capsule (500 mg) by mouth 2 times daily 180 capsule 3    omeprazole (PRILOSEC) 20 MG DR capsule Take 1 capsule (20 mg) by mouth daily 90 capsule 3    pregabalin (LYRICA) 25 MG capsule 25 mg at bedtime x 1 week, 25 mg twice daily x 1 week, 25 mg AM, 50 mg PM x 1 week, 50 mg twice daily x 1 week, 50 mg AM, 75 mg PM x 1 week, 75 mg twice daily thereafter      Probiotic Product (PROBIOTIC DAILY) CAPS Take 1 capsule by mouth daily 90 capsule 3    venlafaxine (EFFEXOR XR) 150 MG 24 hr capsule Take 1 capsule (150 mg) by mouth daily (Total daily dose: 225 mg) 90 capsule 3    venlafaxine (EFFEXOR XR) 75 MG 24 hr capsule Take 1 capsule (75 mg) by mouth daily (Total daily dose:  225 mg) 90 capsule 3    Vitamin D3 (CHOLECALCIFEROL) 25 mcg (1000 units) tablet Take 1 tablet (25 mcg) by mouth daily 90 tablet 3     No current facility-administered medications for this visit.       ALLERGIES:     Allergies   Allergen Reactions    Sulfa Antibiotics     Gluten Meal     Morphine Difficulty breathing       FAMILY HISTORY:  No pertinent family  history    SOCIAL HISTORY:  Social History     Tobacco Use    Smoking status: Former     Current packs/day: 0.00     Average packs/day: 1 pack/day for 5.0 years (5.0 ttl pk-yrs)     Types: Cigarettes     Start date: 1969     Quit date: 3/20/1974     Years since quittin.1     Passive exposure: Past    Smokeless tobacco: Never   Vaping Use    Vaping status: Never Used   Substance Use Topics    Alcohol use: Yes     Comment: About 3-4 a mounth    Drug use: No       The patient's past medical, family, and social history was reviewed and confirmed.    REVIEW OF SYMPTOMS:      General: Negative   Eyes: Negative   Ear, Nose and Throat: Negative   Respiratory: Negative   Cardiovascular: Negative   Gastrointestinal: Negative   Genito-urinary: Negative   Musculoskeletal: see HPI  Neurological: Negative   Psychological: Negative  HEME: Negative   ENDO: Negative   SKIN: Negative    VITALS:  There were no vitals filed for this visit.    EXAM:  General: NAD, A&Ox3  HEENT: NC/AT  CV: RRR by peripheral pulse  Pulmonary: Non-labored breathing on RA  RUE:  Severe triggering of the right middle finger with audible and palpable crepitus when manually unlocking  Tenderness palpation of the A1 pulley  Mild PIP joint flexion contracture  Intact FDP, FDS, EDC  Sensation intact light touch all distributions  WWP, cap refill less than 2 seconds    IMAGING:    None    I have personally reviewed the above images and labs.         IMPRESSION AND RECOMMENDATIONS:  Ms. Austin Shaffer is a 69 year old female right hand dominant with right middle trigger finger    We discussed treatment options including steroid injections and surgery.  Given the severity of triggering, I think that she will achieve a full improvement with steroid injections.  The patient also wishes to proceed with surgery.    The indications for surgery were discussed with the patient. The benefits, risks, and alternatives of operative management were discussed in  detail with the patient. The patient understands that the risks of surgery include, but are not limited to: infection, bleeding, injury to nearby structures (such as nerves, blood vessels, and tendons), wound healing problems, need for additional surgery, pain, stiffness, scarring, need for rehabilitation, and anesthetic complications.  Patient expressed understanding and elected to proceed with surgery. All questions were answered to the patient's satisfaction.    Case request placed, local only.    Franklyn Bernal MD    Hand, Upper Extremity & Microvascular Surgery  Department of Orthopaedic Surgery  [facility]

## 2025-05-14 ENCOUNTER — PATIENT OUTREACH (OUTPATIENT)
Dept: CARE COORDINATION | Facility: CLINIC | Age: 70
End: 2025-05-14
Payer: COMMERCIAL

## 2025-06-10 ENCOUNTER — TELEPHONE (OUTPATIENT)
Dept: ORTHOPEDICS | Facility: CLINIC | Age: 70
End: 2025-06-10
Payer: COMMERCIAL

## 2025-06-10 NOTE — TELEPHONE ENCOUNTER
Other: Patient is requesting a call back RE her surgery this Fri, 6/13.     Could we send this information to you in [employer] or would you prefer to receive a phone call?:   Patient would prefer a phone call   Okay to leave a detailed message?: Yes at Home number on file [phone] (home)

## 2025-06-11 ENCOUNTER — PATIENT OUTREACH (OUTPATIENT)
Dept: CARE COORDINATION | Facility: CLINIC | Age: 70
End: 2025-06-11
Payer: COMMERCIAL

## 2025-06-11 NOTE — TELEPHONE ENCOUNTER
Phone call to patient to inquire what type of questions she has about surgery. She states she couldn't find where she wrote information down about where surgery was and times, etc. In the meantime she has received a text with that information and got her questions answered. She was also informed that they are available via [employer]. She verbalized understanding.     SIOBHAN Keller RN

## 2025-07-10 DIAGNOSIS — F41.9 ANXIETY: ICD-10-CM

## 2025-07-10 DIAGNOSIS — F33.1 MODERATE RECURRENT MAJOR DEPRESSION (H): ICD-10-CM

## 2025-07-10 RX ORDER — VENLAFAXINE HYDROCHLORIDE 150 MG/1
150 CAPSULE, EXTENDED RELEASE ORAL DAILY
Qty: 90 CAPSULE | Refills: 3 | Status: SHIPPED | OUTPATIENT
Start: 2025-07-10

## 2025-07-10 RX ORDER — VENLAFAXINE HYDROCHLORIDE 75 MG/1
CAPSULE, EXTENDED RELEASE ORAL
Qty: 90 CAPSULE | Refills: 0 | Status: SHIPPED | OUTPATIENT
Start: 2025-07-10

## 2025-08-16 ENCOUNTER — HEALTH MAINTENANCE LETTER (OUTPATIENT)
Age: 70
End: 2025-08-16

## 2025-08-20 ENCOUNTER — TELEPHONE (OUTPATIENT)
Dept: ORTHOPEDICS | Facility: CLINIC | Age: 70
End: 2025-08-20
Payer: COMMERCIAL